# Patient Record
Sex: FEMALE | Race: WHITE | NOT HISPANIC OR LATINO | Employment: OTHER | URBAN - METROPOLITAN AREA
[De-identification: names, ages, dates, MRNs, and addresses within clinical notes are randomized per-mention and may not be internally consistent; named-entity substitution may affect disease eponyms.]

---

## 2017-01-04 ENCOUNTER — TELEPHONE (OUTPATIENT)
Dept: FAMILY MEDICINE | Facility: CLINIC | Age: 77
End: 2017-01-04

## 2017-01-04 NOTE — TELEPHONE ENCOUNTER
----- Message from Karen Paz sent at 1/4/2017 12:02 PM CST -----  Contact: pt  Pt called to speak with the nurse, she said it was important, but didn't leave a message. She states she will go to the office in [place]. Pt can be reached at [phone] (toxc)

## 2017-01-04 NOTE — TELEPHONE ENCOUNTER
Pt has OV tomorrow with you, she came in stating her epigastric pain has steadily gotten worse. She would like to know if you suggest anything else to help her through the night. She still has the back pain as well.

## 2017-01-04 NOTE — TELEPHONE ENCOUNTER
----- Message from Augusta Perez sent at 1/4/2017  1:46 PM CST -----  Contact: pt   Pt said don't bother , she wi;l see him tomorrow at 3,,, no need to call back

## 2017-01-05 ENCOUNTER — OFFICE VISIT (OUTPATIENT)
Dept: FAMILY MEDICINE | Facility: CLINIC | Age: 77
End: 2017-01-05
Payer: MEDICARE

## 2017-01-05 VITALS
SYSTOLIC BLOOD PRESSURE: 104 MMHG | DIASTOLIC BLOOD PRESSURE: 68 MMHG | WEIGHT: 158.5 LBS | HEART RATE: 70 BPM | BODY MASS INDEX: 24.88 KG/M2 | HEIGHT: 67 IN

## 2017-01-05 DIAGNOSIS — I25.10 CORONARY ARTERY DISEASE INVOLVING NATIVE CORONARY ARTERY WITHOUT ANGINA PECTORIS, UNSPECIFIED WHETHER NATIVE OR TRANSPLANTED HEART: ICD-10-CM

## 2017-01-05 DIAGNOSIS — K52.9 GASTROENTERITIS: Primary | ICD-10-CM

## 2017-01-05 PROCEDURE — 99999 PR PBB SHADOW E&M-EST. PATIENT-LVL II: CPT | Mod: PBBFAC,,, | Performed by: FAMILY MEDICINE

## 2017-01-05 PROCEDURE — 99212 OFFICE O/P EST SF 10 MIN: CPT | Mod: PBBFAC,PO | Performed by: FAMILY MEDICINE

## 2017-01-05 PROCEDURE — 99213 OFFICE O/P EST LOW 20 MIN: CPT | Mod: S$PBB,,, | Performed by: FAMILY MEDICINE

## 2017-01-05 NOTE — PROGRESS NOTES
The patient presents with a recent history URI resp to Decadron but after taking diclofenac for LBP developed epigastric pain followed by nausea vomiting and diarrhea.No hematemesis,melena generally improved today. Past Medical History:  Past Medical History   Diagnosis Date    Allergy     Anxiety     Asthma     DM (diabetes mellitus)     IBS (irritable bowel syndrome)     Migraine headache     Mitral valve prolapse     Type 2 diabetes mellitus with hyperlipidemia 9/6/2016     Past Surgical History   Procedure Laterality Date    Hysterectomy       partial    Pr colonoscopy,remv lesn,forcep/cautery  8/23/2012           Review of patient's allergies indicates:   Allergen Reactions    Codeine      Other reaction(s): Unknown  unknown    Doxycycline      Other reaction(s): Unknown  unknown    Sulfa (sulfonamide antibiotics)      Other reaction(s): Unknown  unknown     Current Outpatient Prescriptions on File Prior to Visit   Medication Sig Dispense Refill    alprazolam (XANAX) 0.25 MG tablet TAKE 1 TABLET BY MOUTH 4 TIMES DAILY AS NEEDED  90 tablet 5    amitriptyline (ELAVIL) 50 MG tablet Take 1 tablet (50 mg total) by mouth every evening. 30 tablet 11    aspirin (ECOTRIN) 81 MG EC tablet Take 81 mg by mouth once daily.      atorvastatin (LIPITOR) 80 MG tablet Take 80 mg by mouth.      blood sugar diagnostic (CONTOUR TEST STRIPS) Strp Use to test blood glucose twice daily 200 each 12    celecoxib (CELEBREX) 200 MG capsule Take 1 capsule (200 mg total) by mouth 2 (two) times daily. 60 capsule 1    clopidogrel (PLAVIX) 75 mg tablet Take 75 mg by mouth once daily.      colesevelam (WELCHOL) 625 mg tablet Take 3 tablets (1,875 mg total) by mouth 2 (two) times daily with meals. 150 tablet 11    diclofenac (VOLTAREN) 75 MG EC tablet Take 1 tablet (75 mg total) by mouth 2 (two) times daily. 60 tablet 2    levalbuterol (XOPENEX HFA) 45 mcg/actuation inhaler Inhale 1-2 puffs into the lungs every 4 (four)  hours as needed for Wheezing. 15 g 6    metformin (GLUCOPHAGE) 500 MG tablet TAKE 1 TABLET BY MOUTH TWICE DAILY  60 tablet 11    metoprolol succinate (TOPROL-XL) 100 MG 24 hr tablet Take 100 mg by mouth once daily.      MICROLET LANCET Misc test blood glucose once daily 100 each 10    ranitidine (ZANTAC) 300 MG tablet Take 1 tablet (300 mg total) by mouth every evening. 30 tablet 6    tizanidine 4 mg Cap Take 4 mg by mouth every evening. 10 capsule 1    hyoscyamine (ANASPAZ,LEVSIN) 0.125 mg Tab Take 1 tablet (125 mcg total) by mouth every 6 (six) hours as needed. 40 tablet 11     No current facility-administered medications on file prior to visit.      Social History     Social History    Marital status:      Spouse name: N/A    Number of children: N/A    Years of education: N/A     Occupational History    Not on file.     Social History Main Topics    Smoking status: Never Smoker    Smokeless tobacco: Not on file    Alcohol use No    Drug use: No    Sexual activity: Not on file     Other Topics Concern    Not on file     Social History Narrative     Family History   Problem Relation Age of Onset    Heart disease Mother     Diabetes Brother     Heart disease Brother     Early death Brother        ROS:  SKIN: No rashes, itching or changes in color or texture of skin.  EYES: Visual acuity fine. No photophobia, ocular pain or diplopia.EARS: Denies ear pain, discharge or vertigo.NOSE: No loss of smell, no epistaxis or postnasal drip.MOUTH & THROAT: No hoarseness or change in voice. No excessive gum bleeding.NODES: Denies swollen glands.  CHEST: Denies HANSON, cyanosis, wheezing, cough and sputum production.  CARDIOVASCULAR: Denies chest pain, PND, orthopnea or reduced exercise tolerance.  ABDOMEN:  No weight loss.Mild abdominal pain, no hematemesis or blood in stool.  URINARY: No flank pain, dysuria or hematuria.  PERIPHERAL VASCULAR: No claudication or cyanosis.  MUSCULOSKELETAL: Negative    NEUROLOGIC: No history of seizures, paralysis, alteration of gait or coordination.    PE Vital signs noted  Heent: Normocephalic,with no recent trauma,PERRLA,EOMI,conjunctiva clear with no exudate.Nasopharynx is not injected .Otic canal.TMs are not affected.Pharynx is slightly red.   Neck:Supple without adenopathy  Chest:Clear bilateral breath sounds normal  Heart:Regular rhthym without murmer  Abdomen:Soft, mild generalized tenderness,no rebound,no masses, no hepatosplenomegaly  Extremeties and Neurologic:Grossly within normal limits  Impression: Gastroenteritis 558.9  Plan:Probiotics, peptobismal,bland progressive diet as tolerated,Tylenol as needed for fever or mild pain,and observation.Consider Visteril prn n&v and Immodium AD/Lomotil if diarrhea worsens,notify us if not significantly better in 48 hours or if any worsening occurs.

## 2017-01-06 DIAGNOSIS — E11.9 TYPE 2 DIABETES MELLITUS WITHOUT COMPLICATION: ICD-10-CM

## 2017-01-13 ENCOUNTER — PATIENT OUTREACH (OUTPATIENT)
Dept: ADMINISTRATIVE | Facility: CLINIC | Age: 77
End: 2017-01-13
Payer: MEDICARE

## 2017-01-13 RX ORDER — FUROSEMIDE 20 MG/1
20 TABLET ORAL DAILY PRN
COMMUNITY

## 2017-01-13 RX ORDER — TIZANIDINE 4 MG/1
4 TABLET ORAL EVERY 6 HOURS PRN
COMMUNITY
End: 2017-01-19

## 2017-01-13 NOTE — PATIENT INSTRUCTIONS
Discharge Instructions for ERCP (Endoscopic Retrograde Cholangiopancreatography)  You had a procedure known as an ERCP. Your healthcare provider performed the ERCP to look at your bile or pancreatic ducts, and to locate and treat blockages in the ducts. This procedure is used to diagnose diseases of the pancreas, bile ducts, and pancreatic duct, liver, and gallbladder. Heres what you need to do following your ERCP.  Home care  · Dont take aspirin or any other blood-thinning medicines (anticoagulants) until your provider says its OK.  · Your provider may prescribe an antibiotic, depending on what was done during the ERCP.  · You may have a sore throat for 1 to 2 days after the procedure. Use lozenges or gargle with salt water for your sore throat. If you're not better in a few days, call your provider.  · Rest, drink fluids, and eat light meals. If you feel bloated or have too much gas, use a heating pad on your belly to help reduce the discomfort. This should help you feel better. But if it doesn't, call your provider.  · Dont drink alcohol for 2 days after the procedure.  Follow-up care  Make a follow-up appointment as directed by our staff.     When to seek medical care  Call your provider right away if you have any of the following:  · Trouble swallowing or throat pain that gets worse   · Chest pain or severe belly or abdominal pain  · Fever above 100°F (37.7°C) or chills  · Upset stomach (nausea) and vomiting  · Black or tarry stools   © 6694-0593 [employer]. [address] [place] [address], [place] [address]. All rights reserved. This information is not intended as a substitute for professional medical care. Always follow your healthcare professional's instructions.

## 2017-01-19 ENCOUNTER — PATIENT OUTREACH (OUTPATIENT)
Dept: ADMINISTRATIVE | Facility: HOSPITAL | Age: 77
End: 2017-01-19
Payer: MEDICARE

## 2017-01-19 ENCOUNTER — OFFICE VISIT (OUTPATIENT)
Dept: FAMILY MEDICINE | Facility: CLINIC | Age: 77
End: 2017-01-19
Payer: MEDICARE

## 2017-01-19 VITALS
SYSTOLIC BLOOD PRESSURE: 100 MMHG | DIASTOLIC BLOOD PRESSURE: 52 MMHG | HEIGHT: 67 IN | WEIGHT: 155.88 LBS | HEART RATE: 76 BPM | BODY MASS INDEX: 24.47 KG/M2

## 2017-01-19 DIAGNOSIS — K80.50 CHOLEDOCHOLITHIASIS: Primary | ICD-10-CM

## 2017-01-19 PROCEDURE — 99213 OFFICE O/P EST LOW 20 MIN: CPT | Mod: PBBFAC,PO | Performed by: FAMILY MEDICINE

## 2017-01-19 PROCEDURE — 99495 TRANSJ CARE MGMT MOD F2F 14D: CPT | Mod: PBBFAC,PO | Performed by: FAMILY MEDICINE

## 2017-01-19 PROCEDURE — 99999 PR PBB SHADOW E&M-EST. PATIENT-LVL III: CPT | Mod: PBBFAC,,, | Performed by: FAMILY MEDICINE

## 2017-01-19 NOTE — PROGRESS NOTES
Transitional Care Note  Subjective:       Patient ID: Alon Chatman is a 76 y.o. female.  Chief Complaint: No chief complaint on file.    Family and/or Caretaker present at visit?  No.  Diagnostic tests reviewed/disposition: No diagnosic tests pending after this hospitalization.  Disease/illness education: Choledoco;ithiasis  Home health/community services discussion/referrals: Patient does not have home health established from hospital visit.  They do not need home health.  If needed, we will set up home health for the patient.   Establishment or re-establishment of referral orders for community resources: No other necessary community resources.   Discussion with other health care providers: No discussion with other health care providers necessary.   HPI  Review of Systems    Objective:      Physical Exam    Assessment:       No diagnosis found.    Plan:          Past Medical History   Diagnosis Date    Allergy     Anxiety     Asthma     DM (diabetes mellitus)     IBS (irritable bowel syndrome)     Migraine headache     Mitral valve prolapse     Type 2 diabetes mellitus with hyperlipidemia 9/6/2016     Past Surgical History   Procedure Laterality Date    Hysterectomy       partial    Pr colonoscopy,remv lesn,forcep/cautery  8/23/2012           Review of patient's allergies indicates:   Allergen Reactions    Codeine      Other reaction(s): Unknown  unknown    Diclofenac Nausea And Vomiting    Doxycycline      Other reaction(s): Unknown  unknown    Sulfa (sulfonamide antibiotics)      Other reaction(s): Unknown  unknown     Current Outpatient Prescriptions on File Prior to Visit   Medication Sig Dispense Refill    alprazolam (XANAX) 0.25 MG tablet TAKE 1 TABLET BY MOUTH 4 TIMES DAILY AS NEEDED  90 tablet 5    amitriptyline (ELAVIL) 50 MG tablet Take 1 tablet (50 mg total) by mouth every evening. 30 tablet 11    aspirin (ECOTRIN) 81 MG EC tablet Take 81 mg by mouth once daily.      atorvastatin  (LIPITOR) 80 MG tablet Take 80 mg by mouth.      blood sugar diagnostic (CONTOUR TEST STRIPS) Strp Use to test blood glucose twice daily 200 each 12    clopidogrel (PLAVIX) 75 mg tablet Take 75 mg by mouth once daily.      colesevelam (WELCHOL) 625 mg tablet Take 3 tablets (1,875 mg total) by mouth 2 (two) times daily with meals. 150 tablet 11    furosemide (LASIX) 20 MG tablet Take 20 mg by mouth daily as needed.      metformin (GLUCOPHAGE) 500 MG tablet TAKE 1 TABLET BY MOUTH TWICE DAILY  60 tablet 11    metoprolol succinate (TOPROL-XL) 100 MG 24 hr tablet Take 100 mg by mouth once daily.      MICROLET LANCET Misc test blood glucose once daily 100 each 10    ranitidine (ZANTAC) 300 MG tablet Take 1 tablet (300 mg total) by mouth every evening. 30 tablet 6    hyoscyamine (ANASPAZ,LEVSIN) 0.125 mg Tab Take 1 tablet (125 mcg total) by mouth every 6 (six) hours as needed. 40 tablet 11    levalbuterol (XOPENEX HFA) 45 mcg/actuation inhaler Inhale 1-2 puffs into the lungs every 4 (four) hours as needed for Wheezing. 15 g 6    [DISCONTINUED] celecoxib (CELEBREX) 200 MG capsule Take 1 capsule (200 mg total) by mouth 2 (two) times daily. 60 capsule 1    [DISCONTINUED] diclofenac (VOLTAREN) 75 MG EC tablet Take 1 tablet (75 mg total) by mouth 2 (two) times daily. 60 tablet 2    [DISCONTINUED] tizanidine (ZANAFLEX) 4 MG tablet Take 4 mg by mouth every 6 (six) hours as needed (PRN).       No current facility-administered medications on file prior to visit.      Social History     Social History    Marital status:      Spouse name: N/A    Number of children: N/A    Years of education: N/A     Occupational History    Not on file.     Social History Main Topics    Smoking status: Never Smoker    Smokeless tobacco: Not on file    Alcohol use No    Drug use: No    Sexual activity: Not on file     Other Topics Concern    Not on file     Social History Narrative     Family History   Problem Relation Age  of Onset    Heart disease Mother     Diabetes Brother     Heart disease Brother     Early death Brother        ROS:  SKIN: No rashes, itching or changes in color or texture of skin.  EYES: Visual acuity fine. No photophobia, ocular pain or diplopia.EARS: Denies ear pain, discharge or vertigo.NOSE: No loss of smell, no epistaxis or postnasal drip.MOUTH & THROAT: No hoarseness or change in voice. No excessive gum bleeding.NODES: Denies swollen glands.  CHEST: Denies HANSON, cyanosis, wheezing, cough and sputum production.  CARDIOVASCULAR: Denies chest pain, PND, orthopnea or reduced exercise tolerance.  ABDOMEN:  No weight loss.Mild abdominal pain, no hematemesis or blood in stool.  URINARY: No flank pain, dysuria or hematuria.  PERIPHERAL VASCULAR: No claudication or cyanosis.  MUSCULOSKELETAL: Negative   NEUROLOGIC: No history of seizures, paralysis, alteration of gait or coordination.    PE Vital signs noted  Heent: Normocephalic,with no recent trauma,PERRLA,EOMI,conjunctiva clear with no exudate.Nasopharynx is not injected .Otic canal.TMs are not affected.Pharynx is slightly red.   Neck:Supple without adenopathy  Chest:Clear bilateral breath sounds normal  Heart:Regular rhthym without murmer  Abdomen:Soft, no tenderness,no rebound,no masses, no hepatosplenomegaly  Extremeties and Neurologic:Grossly within normal limits

## 2017-01-27 ENCOUNTER — TELEPHONE (OUTPATIENT)
Dept: FAMILY MEDICINE | Facility: CLINIC | Age: 77
End: 2017-01-27

## 2017-01-27 ENCOUNTER — LAB VISIT (OUTPATIENT)
Dept: LAB | Facility: HOSPITAL | Age: 77
End: 2017-01-27
Attending: FAMILY MEDICINE
Payer: MEDICARE

## 2017-01-27 DIAGNOSIS — E11.9 TYPE 2 DIABETES MELLITUS WITHOUT COMPLICATION: ICD-10-CM

## 2017-01-27 DIAGNOSIS — R30.0 DYSURIA: Primary | ICD-10-CM

## 2017-01-27 DIAGNOSIS — R30.0 DYSURIA: ICD-10-CM

## 2017-01-27 LAB
BACTERIA #/AREA URNS HPF: ABNORMAL /HPF
BILIRUB UR QL STRIP: ABNORMAL
CLARITY UR: ABNORMAL
COLOR UR: ABNORMAL
CREAT UR-MCNC: 246 MG/DL
GLUCOSE UR QL STRIP: ABNORMAL
HGB UR QL STRIP: ABNORMAL
KETONES UR QL STRIP: ABNORMAL
LEUKOCYTE ESTERASE UR QL STRIP: ABNORMAL
MICROALBUMIN UR DL<=1MG/L-MCNC: 419 UG/ML
MICROALBUMIN/CREATININE RATIO: 170.3 UG/MG
MICROSCOPIC COMMENT: ABNORMAL
NITRITE UR QL STRIP: ABNORMAL
PH UR STRIP: ABNORMAL [PH] (ref 5–8)
PROT UR QL STRIP: ABNORMAL
RBC #/AREA URNS HPF: 20 /HPF (ref 0–4)
SP GR UR STRIP: ABNORMAL (ref 1–1.03)
SQUAMOUS #/AREA URNS HPF: 5 /HPF
URN SPEC COLLECT METH UR: ABNORMAL
WBC #/AREA URNS HPF: >100 /HPF (ref 0–5)

## 2017-01-27 PROCEDURE — 82570 ASSAY OF URINE CREATININE: CPT

## 2017-01-27 PROCEDURE — 81000 URINALYSIS NONAUTO W/SCOPE: CPT | Mod: PO

## 2017-01-28 ENCOUNTER — TELEPHONE (OUTPATIENT)
Dept: FAMILY MEDICINE | Facility: CLINIC | Age: 77
End: 2017-01-28

## 2017-01-28 DIAGNOSIS — R80.9 PROTEINURIA, UNSPECIFIED TYPE: Primary | ICD-10-CM

## 2017-01-31 RX ORDER — CEPHALEXIN 500 MG/1
500 CAPSULE ORAL 2 TIMES DAILY
Qty: 14 CAPSULE | Refills: 0 | Status: SHIPPED | OUTPATIENT
Start: 2017-01-31 | End: 2017-02-07

## 2017-01-31 NOTE — TELEPHONE ENCOUNTER
----- Message from Tanya Petersen sent at 1/31/2017  3:07 PM CST -----  Contact: self [phone]  States that she would like to speak to nurse regarding her medications. Please call back at [phone]//thank you acc

## 2017-01-31 NOTE — TELEPHONE ENCOUNTER
Macrobid=macrodantin  Since  she can't take sulfa or doxy could she take cephalexin if so 500mg #14 bid OR flagyl 500 #14 bid if cephalexin is a problem

## 2017-01-31 NOTE — TELEPHONE ENCOUNTER
Pt states that the macrobid has not helped her UTI, would like to know if she can have macrodantin called into pharmacy

## 2017-02-10 PROCEDURE — 99495 TRANSJ CARE MGMT MOD F2F 14D: CPT | Mod: S$PBB,,, | Performed by: FAMILY MEDICINE

## 2017-02-15 RX ORDER — METFORMIN HYDROCHLORIDE 500 MG/1
500 TABLET ORAL 2 TIMES DAILY
Qty: 180 TABLET | Refills: 3 | Status: SHIPPED | OUTPATIENT
Start: 2017-02-15 | End: 2018-02-28 | Stop reason: SDUPTHER

## 2017-03-07 ENCOUNTER — OFFICE VISIT (OUTPATIENT)
Dept: FAMILY MEDICINE | Facility: CLINIC | Age: 77
End: 2017-03-07
Payer: MEDICARE

## 2017-03-07 VITALS
TEMPERATURE: 99 F | HEART RATE: 72 BPM | BODY MASS INDEX: 26.17 KG/M2 | WEIGHT: 162.81 LBS | HEIGHT: 66 IN | DIASTOLIC BLOOD PRESSURE: 47 MMHG | SYSTOLIC BLOOD PRESSURE: 117 MMHG

## 2017-03-07 DIAGNOSIS — R30.0 DYSURIA: ICD-10-CM

## 2017-03-07 DIAGNOSIS — N39.0 URINARY TRACT INFECTION WITHOUT HEMATURIA, SITE UNSPECIFIED: Primary | ICD-10-CM

## 2017-03-07 LAB
BACTERIA #/AREA URNS HPF: ABNORMAL /HPF
BILIRUB UR QL STRIP: NEGATIVE
CLARITY UR: CLEAR
COLOR UR: YELLOW
GLUCOSE UR QL STRIP: NEGATIVE
HGB UR QL STRIP: ABNORMAL
KETONES UR QL STRIP: NEGATIVE
LEUKOCYTE ESTERASE UR QL STRIP: ABNORMAL
MICROSCOPIC COMMENT: ABNORMAL
NITRITE UR QL STRIP: POSITIVE
PH UR STRIP: 6 [PH] (ref 5–8)
PROT UR QL STRIP: NEGATIVE
RBC #/AREA URNS HPF: 15 /HPF (ref 0–4)
SP GR UR STRIP: 1.01 (ref 1–1.03)
SQUAMOUS #/AREA URNS HPF: 2 /HPF
URN SPEC COLLECT METH UR: ABNORMAL
WBC #/AREA URNS HPF: 60 /HPF (ref 0–5)
WBC CLUMPS URNS QL MICRO: ABNORMAL

## 2017-03-07 PROCEDURE — 99214 OFFICE O/P EST MOD 30 MIN: CPT | Mod: PBBFAC,PO | Performed by: NURSE PRACTITIONER

## 2017-03-07 PROCEDURE — 99213 OFFICE O/P EST LOW 20 MIN: CPT | Mod: S$PBB,,, | Performed by: NURSE PRACTITIONER

## 2017-03-07 PROCEDURE — 99999 PR PBB SHADOW E&M-EST. PATIENT-LVL IV: CPT | Mod: PBBFAC,,, | Performed by: NURSE PRACTITIONER

## 2017-03-07 PROCEDURE — 81000 URINALYSIS NONAUTO W/SCOPE: CPT | Mod: PO

## 2017-03-07 RX ORDER — NITROFURANTOIN 25; 75 MG/1; MG/1
100 CAPSULE ORAL 2 TIMES DAILY
Qty: 20 CAPSULE | Refills: 0 | Status: SHIPPED | OUTPATIENT
Start: 2017-03-07 | End: 2017-03-17

## 2017-03-07 RX ORDER — PHENAZOPYRIDINE HYDROCHLORIDE 100 MG/1
100 TABLET, FILM COATED ORAL 2 TIMES DAILY PRN
Qty: 6 TABLET | Refills: 0 | Status: SHIPPED | OUTPATIENT
Start: 2017-03-07 | End: 2017-03-10

## 2017-03-07 NOTE — MR AVS SNAPSHOT
[facility]  [address]  [place] [address]  Phone: [phone]  Fax: [phone]                  Alon Chatman   3/7/2017 2:20 PM   Office Visit    Description:  Female : 1940   Provider:  Debora Neumann NP   Department:  [facility]           Reason for Visit     Urinary Frequency           Diagnoses this Visit        Comments    Urinary tract infection without hematuria, site unspecified    -  Primary     Dysuria                To Do List           Future Appointments        Provider Department Dept Phone    3/28/2017 8:20 AM SPECIMEN, [place] [facility] [phone]      Goals (5 Years of Data)     None       These Medications        Disp Refills Start End    nitrofurantoin, macrocrystal-monohydrate, (MACROBID) 100 MG capsule 20 capsule 0 3/7/2017 3/17/2017    Take 1 capsule (100 mg total) by mouth 2 (two) times daily. - Oral    Pharmacy: [id number] - [place] - [address] Ph #: [phone]       phenazopyridine (PYRIDIUM) 100 MG tablet 6 tablet 0 3/7/2017 3/10/2017    Take 1 tablet (100 mg total) by mouth 2 (two) times daily as needed. - Oral    [id number] [place] - [address] Ph #: [phone]         [facility]     [facility] Nurse Care [facility] -  Assistance  Registered nurses in the [facility] provide clinical advisement, health education, appointment booking, and other advisory services.  Call for this free service at [phone].             Medications           Message regarding Medications     Verify the changes and/or additions to your medication regime listed below are the same as discussed with your clinician today.  If any of these changes or additions are incorrect, please notify your healthcare provider.        START taking these NEW medications        Refills    nitrofurantoin,  macrocrystal-monohydrate, (MACROBID) 100 MG capsule 0    Sig: Take 1 capsule (100 mg total) by mouth 2 (two) times daily.    Class: Normal    Route: Oral    phenazopyridine (PYRIDIUM) 100 MG tablet 0    Sig: Take 1 tablet (100 mg total) by mouth 2 (two) times daily as needed.    Class: Normal    Route: Oral           Verify that the below list of medications is an accurate representation of the medications you are currently taking.  If none reported, the list may be blank. If incorrect, please contact your healthcare provider. Carry this list with you in case of emergency.           Current Medications     alprazolam (XANAX) 0.25 MG tablet TAKE 1 TABLET BY MOUTH 4 TIMES DAILY AS NEEDED     amitriptyline (ELAVIL) 50 MG tablet Take 1 tablet (50 mg total) by mouth every evening.    aspirin (ECOTRIN) 81 MG EC tablet Take 81 mg by mouth once daily.    atorvastatin (LIPITOR) 80 MG tablet Take 80 mg by mouth.    blood sugar diagnostic (CONTOUR TEST STRIPS) Strp Use to test blood glucose twice daily    clopidogrel (PLAVIX) 75 mg tablet Take 75 mg by mouth once daily.    colesevelam (WELCHOL) 625 mg tablet Take 3 tablets (1,875 mg total) by mouth 2 (two) times daily with meals.    furosemide (LASIX) 20 MG tablet Take 20 mg by mouth daily as needed.    levalbuterol (XOPENEX HFA) 45 mcg/actuation inhaler Inhale 1-2 puffs into the lungs every 4 (four) hours as needed for Wheezing.    metformin (GLUCOPHAGE) 500 MG tablet Take 1 tablet (500 mg total) by mouth 2 (two) times daily.    metoprolol succinate (TOPROL-XL) 100 MG 24 hr tablet Take 100 mg by mouth once daily.    MICROLET LANCET Misc test blood glucose once daily    ranitidine (ZANTAC) 300 MG tablet Take 1 tablet (300 mg total) by mouth every evening.    hyoscyamine (ANASPAZ,LEVSIN) 0.125 mg Tab Take 1 tablet (125 mcg total) by mouth every 6 (six) hours as needed.    nitrofurantoin, macrocrystal-monohydrate, (MACROBID) 100 MG capsule Take 1 capsule (100 mg total) by mouth  "2 (two) times daily.    phenazopyridine (PYRIDIUM) 100 MG tablet Take 1 tablet (100 mg total) by mouth 2 (two) times daily as needed.           Clinical Reference Information           Your Vitals Were     BP Pulse Temp Height Weight BMI    117/47 72 98.9 °F (37.2 °C) 5' 6" (1.676 m) 73.9 kg (162 lb 13 oz) 26.28 kg/m2      Blood Pressure          Most Recent Value    BP  (!)  117/47      Allergies as of 3/7/2017     Codeine    Diclofenac    Doxycycline    Sulfa (Sulfonamide Antibiotics)      Immunizations Administered on Date of Encounter - 3/7/2017     None      Orders Placed During Today's Visit      Normal Orders This Visit    Urinalysis Microscopic     Urinalysis       Language Assistance Services     ATTENTION: Language assistance services are available, free of charge. Please call [phone].      ATENCIÓN: Si marcellus puente, tiene a golden disposición servicios gratuitos de asistencia lingüística. Llame al [phone].     HUGH Ý: N?u b?n nói Ti?ng Vi?t, có các d?ch v? h? tr? ngôn ng? mi?n phí dành cho b?n. G?i s? [phone].         [facility] complies with applicable Federal civil rights laws and does not discriminate on the basis of race, color, national origin, age, disability, or sex.        "

## 2017-03-07 NOTE — PROGRESS NOTES
Subjective:       Patient ID: Alon Chatman is a 76 y.o. female.    Chief Complaint: Urinary Frequency (pain)    Dysuria    This is a new problem. The current episode started in the past 7 days. The problem occurs every urination. The problem has been unchanged. The quality of the pain is described as burning. The pain is moderate. There has been no fever. There is no history of pyelonephritis. Associated symptoms include frequency and urgency. Pertinent negatives include no behavior changes, chills, discharge, flank pain, hematuria, hesitancy, nausea, possible pregnancy, sweats, vomiting, weight loss, bubble bath use, constipation, rash or withholding. Treatments tried: AZO. The treatment provided mild relief. Her past medical history is significant for diabetes mellitus and recurrent UTIs. There is no history of catheterization, diabetes insipidus, genitourinary reflux, hypertension, kidney stones, a single kidney, STD, urinary stasis or a urological procedure.     Past Medical History:   Diagnosis Date    Allergy     Anxiety     Asthma     DM (diabetes mellitus)     IBS (irritable bowel syndrome)     Migraine headache     Mitral valve prolapse     Type 2 diabetes mellitus with hyperlipidemia 9/6/2016     Social History     Social History    Marital status:      Spouse name: N/A    Number of children: N/A    Years of education: N/A     Occupational History         Social History Main Topics    Smoking status: Never Smoker    Smokeless tobacco: Not on file    Alcohol use No    Drug use: No    Sexual activity: Not on file     Past Surgical History:   Procedure Laterality Date    HYSTERECTOMY      partial    GA COLONOSCOPY,REMV LESN,FORCEP/CAUTERY  8/23/2012            Review of Systems   Constitutional: Negative.  Negative for chills and weight loss.   HENT: Negative.    Eyes: Negative.    Respiratory: Negative.    Cardiovascular: Negative.    Gastrointestinal: Negative.  Negative for  constipation, nausea and vomiting.   Endocrine: Negative.    Genitourinary: Positive for dysuria, frequency and urgency. Negative for flank pain, hematuria and hesitancy.   Musculoskeletal: Negative.    Skin: Negative.  Negative for rash.   Allergic/Immunologic: Negative.    Neurological: Negative.    Psychiatric/Behavioral: Negative.        Objective:      Physical Exam   Constitutional: She is oriented to person, place, and time. She appears well-developed and well-nourished.   HENT:   Head: Normocephalic.   Right Ear: External ear normal.   Left Ear: External ear normal.   Nose: Nose normal.   Mouth/Throat: Oropharynx is clear and moist.   Eyes: Conjunctivae are normal. Pupils are equal, round, and reactive to light.   Neck: Normal range of motion. Neck supple.   Cardiovascular: Normal rate, regular rhythm and normal heart sounds.    Pulmonary/Chest: Effort normal and breath sounds normal.   Abdominal: Soft. Bowel sounds are normal. There is no tenderness. There is no CVA tenderness.   Musculoskeletal: Normal range of motion.   Neurological: She is alert and oriented to person, place, and time.   Skin: Skin is warm and dry.   Psychiatric: She has a normal mood and affect. Her behavior is normal. Judgment and thought content normal.   Nursing note and vitals reviewed.      Assessment:       1. Urinary tract infection without hematuria, site unspecified    2. Dysuria        Plan:           Alon was seen today for urinary frequency.    Diagnoses and all orders for this visit:    Urinary tract infection without hematuria, site unspecified  Dysuria  -     Urinalysis  -     Urinalysis Microscopic  -     nitrofurantoin, macrocrystal-monohydrate, (MACROBID) 100 MG capsule; Take 1 capsule (100 mg total) by mouth 2 (two) times daily.  -     phenazopyridine (PYRIDIUM) 100 MG tablet; Take 1 tablet (100 mg total) by mouth 2 (two) times daily as needed.

## 2017-03-31 DIAGNOSIS — E11.9 TYPE 2 DIABETES MELLITUS WITHOUT COMPLICATION: ICD-10-CM

## 2017-09-10 RX ORDER — MONTELUKAST SODIUM 10 MG/1
TABLET ORAL
Qty: 30 TABLET | Refills: 12 | Status: SHIPPED | OUTPATIENT
Start: 2017-09-10 | End: 2017-09-11 | Stop reason: SDUPTHER

## 2017-09-11 RX ORDER — MONTELUKAST SODIUM 10 MG/1
10 TABLET ORAL DAILY
Qty: 30 TABLET | Refills: 12 | Status: SHIPPED | OUTPATIENT
Start: 2017-09-11 | End: 2018-09-30 | Stop reason: SDUPTHER

## 2017-10-12 ENCOUNTER — TELEPHONE (OUTPATIENT)
Dept: FAMILY MEDICINE | Facility: CLINIC | Age: 77
End: 2017-10-12

## 2017-10-12 DIAGNOSIS — Z12.31 SCREENING MAMMOGRAM, ENCOUNTER FOR: Primary | ICD-10-CM

## 2017-10-12 DIAGNOSIS — E11.9 CONTROLLED TYPE 2 DIABETES MELLITUS WITHOUT COMPLICATION, WITHOUT LONG-TERM CURRENT USE OF INSULIN: ICD-10-CM

## 2017-10-12 NOTE — TELEPHONE ENCOUNTER
----- Message from Karolyn Carson sent at 10/12/2017  9:17 AM CDT -----  Contact: Patient  Patient is requesting a mammogram and labs but there are no orders, please call her back at [phone]. Thank you

## 2017-10-16 ENCOUNTER — HOSPITAL ENCOUNTER (OUTPATIENT)
Dept: RADIOLOGY | Facility: HOSPITAL | Age: 77
Discharge: HOME | End: 2017-10-16
Attending: FAMILY MEDICINE
Payer: MEDICARE

## 2017-10-16 VITALS — WEIGHT: 162 LBS | HEIGHT: 66 IN | BODY MASS INDEX: 26.03 KG/M2

## 2017-10-16 DIAGNOSIS — Z12.31 SCREENING MAMMOGRAM, ENCOUNTER FOR: ICD-10-CM

## 2017-10-16 PROCEDURE — 77067 SCR MAMMO BI INCL CAD: CPT | Mod: TC

## 2017-10-16 PROCEDURE — 77067 SCR MAMMO BI INCL CAD: CPT | Mod: 26,,, | Performed by: RADIOLOGY

## 2017-10-16 PROCEDURE — 77063 BREAST TOMOSYNTHESIS BI: CPT | Mod: 26,,, | Performed by: RADIOLOGY

## 2017-10-18 ENCOUNTER — TELEPHONE (OUTPATIENT)
Dept: FAMILY MEDICINE | Facility: CLINIC | Age: 77
End: 2017-10-18

## 2017-10-18 DIAGNOSIS — E11.9 TYPE 2 DIABETES MELLITUS WITHOUT COMPLICATION, WITHOUT LONG-TERM CURRENT USE OF INSULIN: Primary | ICD-10-CM

## 2017-10-23 RX ORDER — AMITRIPTYLINE HYDROCHLORIDE 50 MG/1
50 TABLET, FILM COATED ORAL NIGHTLY
Qty: 30 TABLET | Refills: 11 | Status: SHIPPED | OUTPATIENT
Start: 2017-10-23 | End: 2018-10-29 | Stop reason: SDUPTHER

## 2017-11-08 DIAGNOSIS — Z23 NEED FOR PROPHYLACTIC VACCINATION AND INOCULATION AGAINST INFLUENZA: Primary | ICD-10-CM

## 2018-01-15 ENCOUNTER — OFFICE VISIT (OUTPATIENT)
Dept: FAMILY MEDICINE | Facility: CLINIC | Age: 78
End: 2018-01-15
Payer: MEDICARE

## 2018-01-15 VITALS
WEIGHT: 166 LBS | SYSTOLIC BLOOD PRESSURE: 118 MMHG | BODY MASS INDEX: 26.68 KG/M2 | HEART RATE: 71 BPM | HEIGHT: 66 IN | TEMPERATURE: 98 F | DIASTOLIC BLOOD PRESSURE: 56 MMHG

## 2018-01-15 DIAGNOSIS — J06.9 UPPER RESPIRATORY TRACT INFECTION, UNSPECIFIED TYPE: Primary | ICD-10-CM

## 2018-01-15 DIAGNOSIS — E11.69 TYPE 2 DIABETES MELLITUS WITH HYPERLIPIDEMIA: ICD-10-CM

## 2018-01-15 DIAGNOSIS — E78.5 TYPE 2 DIABETES MELLITUS WITH HYPERLIPIDEMIA: ICD-10-CM

## 2018-01-15 DIAGNOSIS — J45.909 ASTHMA, UNSPECIFIED ASTHMA SEVERITY, UNSPECIFIED WHETHER COMPLICATED, UNSPECIFIED WHETHER PERSISTENT: ICD-10-CM

## 2018-01-15 PROCEDURE — 99999 PR PBB SHADOW E&M-EST. PATIENT-LVL II: CPT | Mod: PBBFAC,,, | Performed by: FAMILY MEDICINE

## 2018-01-15 PROCEDURE — 96372 THER/PROPH/DIAG INJ SC/IM: CPT | Mod: PBBFAC,PO

## 2018-01-15 PROCEDURE — 99212 OFFICE O/P EST SF 10 MIN: CPT | Mod: PBBFAC,PO | Performed by: FAMILY MEDICINE

## 2018-01-15 PROCEDURE — 99213 OFFICE O/P EST LOW 20 MIN: CPT | Mod: S$PBB,,, | Performed by: FAMILY MEDICINE

## 2018-01-15 RX ORDER — FLUTICASONE PROPIONATE AND SALMETEROL 100; 50 UG/1; UG/1
1 POWDER RESPIRATORY (INHALATION) 2 TIMES DAILY
Qty: 60 EACH | Refills: 11 | Status: SHIPPED | OUTPATIENT
Start: 2018-01-15 | End: 2020-02-20

## 2018-01-15 RX ORDER — DEXAMETHASONE SODIUM PHOSPHATE 4 MG/ML
8 INJECTION, SOLUTION INTRA-ARTICULAR; INTRALESIONAL; INTRAMUSCULAR; INTRAVENOUS; SOFT TISSUE ONCE
Status: COMPLETED | OUTPATIENT
Start: 2018-01-15 | End: 2018-01-15

## 2018-01-15 RX ADMIN — DEXAMETHASONE SODIUM PHOSPHATE 8 MG: 4 INJECTION, SOLUTION INTRAMUSCULAR; INTRAVENOUS at 11:01

## 2018-01-15 NOTE — PROGRESS NOTES
Alon Chatman presents with moderate upper respiratory congestion,rhinnorhea,moderate cough past 2-3 days. OTC help slightly. Denies nausea,vomiting,diarrhea or significant fever.  DM under control Asthma   exac erbated     Past Medical History:   Diagnosis Date    Allergy     Anxiety     Asthma     DM (diabetes mellitus)     IBS (irritable bowel syndrome)     Migraine headache     Mitral valve prolapse     Type 2 diabetes mellitus with hyperlipidemia 9/6/2016     Past Surgical History:   Procedure Laterality Date    BREAST BIOPSY      BREAST LUMPECTOMY      HYSTERECTOMY      partial    [place] COLONOSCOPY,REMV LESN,FORCEP/CAUTERY  8/23/2012          Review of patient's allergies indicates:   Allergen Reactions    Codeine      Other reaction(s): Unknown  unknown    Diclofenac Nausea And Vomiting    Doxycycline      Other reaction(s): Unknown  unknown    Sulfa (sulfonamide antibiotics)      Other reaction(s): Unknown  unknown     Current Outpatient Prescriptions on File Prior to Visit   Medication Sig Dispense Refill    alprazolam (XANAX) 0.25 MG tablet TAKE 1 TABLET BY MOUTH 4 TIMES DAILY AS NEEDED  90 tablet 5    amitriptyline (ELAVIL) 50 MG tablet Take 1 tablet (50 mg total) by mouth every evening. 30 tablet 11    aspirin (ECOTRIN) 81 MG EC tablet Take 81 mg by mouth once daily.      atorvastatin (LIPITOR) 80 MG tablet Take 80 mg by mouth.      blood sugar diagnostic (CONTOUR TEST STRIPS) Strp Use to test blood glucose twice daily 200 each 12    clopidogrel (PLAVIX) 75 mg tablet Take 75 mg by mouth once daily.      colesevelam (WELCHOL) 625 mg tablet Take 3 tablets (1,875 mg total) by mouth 2 (two) times daily with meals. (Patient taking differently: Take 1,875 mg by mouth once daily. ) 150 tablet 11    furosemide (LASIX) 20 MG tablet Take 20 mg by mouth daily as needed.      metformin (GLUCOPHAGE) 500 MG tablet Take 1 tablet (500 mg total) by mouth 2 (two) times daily. 180 tablet 3     metoprolol succinate (TOPROL-XL) 100 MG 24 hr tablet Take 50 mg by mouth once daily.       MICROLET LANCET Misc test blood glucose once daily 100 each 10    montelukast (SINGULAIR) 10 mg tablet Take 1 tablet (10 mg total) by mouth once daily. 30 tablet 12    hyoscyamine (ANASPAZ,LEVSIN) 0.125 mg Tab Take 1 tablet (125 mcg total) by mouth every 6 (six) hours as needed. 40 tablet 11    levalbuterol (XOPENEX HFA) 45 mcg/actuation inhaler Inhale 1-2 puffs into the lungs every 4 (four) hours as needed for Wheezing. 15 g 6    ranitidine (ZANTAC) 300 MG tablet Take 1 tablet (300 mg total) by mouth every evening. 30 tablet 6     No current facility-administered medications on file prior to visit.      Social History     Social History    Marital status:      Spouse name: N/A    Number of children: N/A    Years of education: N/A     Occupational History    Not on file.     Social History Main Topics    Smoking status: Never Smoker    Smokeless tobacco: Not on file    Alcohol use No    Drug use: No    Sexual activity: Not on file     Other Topics Concern    Not on file     Social History Narrative    No narrative on file     Family History   Problem Relation Age of Onset    Heart disease Mother     Diabetes Brother     Heart disease Brother     Early death Brother     Breast cancer Maternal Aunt          ROS:  SKIN: No rashes, itching or changes in color or texture of skin.  EYES: Visual acuity fine. No photophobia, ocular pain or diplopia.EARS: Denies ear pain, discharge or vertigo.NOSE: No loss of smell, no epistaxis some postnasal drip.MOUTH & THROAT: No hoarseness or change in voice. No excessive gum bleeding.CHEST: Denies HANSON, cyanosis, wheezing  CARDIOVASCULAR: Denies chest pain, PND, orthopnea or reduced exercise tolerance.  ABDOMEN:  No weight loss.No abdominal pain, no hematemesis or blood in stool.  URINARY: No flank pain, dysuria or hematuria.  PERIPHERAL VASCULAR: No claudication or  cyanosis.  MUSCULOSKELETAL: Negative   NEUROLOGIC: No history of seizures, paralysis, alteration of gait or coordination.    PE: Vital signs as noted  Heent:Normocephalic with no recent cranial trauma,PERRLA,EOMI,conjunctiva clear,fundi reveal no hemmorhage exudate or papilledema.Otic canals clear, tympanic membranes slightly dull bilaterally.Nasal mucosa slightly red and edematous.Posterior pharynx slightly red but without exudate.  Neck:Supple with minimal anterior cervical adenopathy.  Chest:Clear bilateral breath sounds with mild scattered ronchi  Heart:Regular rhthym without murmer  Abdomen:Soft, non tender,no masses, no hepatosplenomegalyExtremeties and Neurologic:Grossly within normal limits  Impression: Upper Respiratory Infection. 465.9  DM-last a1c 6.2   Asthma    Plan: Dexa 8im  If ftp advair   Rev rec DM

## 2018-03-01 RX ORDER — METFORMIN HYDROCHLORIDE 500 MG/1
TABLET ORAL
Qty: 180 TABLET | Refills: 2 | Status: SHIPPED | OUTPATIENT
Start: 2018-03-01 | End: 2018-11-29 | Stop reason: SDUPTHER

## 2018-06-14 ENCOUNTER — TELEPHONE (OUTPATIENT)
Dept: FAMILY MEDICINE | Facility: CLINIC | Age: 78
End: 2018-06-14

## 2018-06-14 DIAGNOSIS — E11.9 TYPE 2 DIABETES MELLITUS WITHOUT COMPLICATION, WITHOUT LONG-TERM CURRENT USE OF INSULIN: Primary | ICD-10-CM

## 2018-06-14 RX ORDER — ALPRAZOLAM 0.25 MG/1
TABLET ORAL
Qty: 90 TABLET | Refills: 4 | Status: SHIPPED | OUTPATIENT
Start: 2018-06-14 | End: 2018-10-18 | Stop reason: SDUPTHER

## 2018-06-14 NOTE — TELEPHONE ENCOUNTER
----- Message from Mark Chu MD sent at 6/14/2018  6:57 AM CDT -----  I sent xanax rf-pls tell her our policy but ok to wait until aroud Oct for fu visit and we can do lab etc

## 2018-09-30 RX ORDER — MONTELUKAST SODIUM 10 MG/1
TABLET ORAL
Qty: 30 TABLET | Refills: 11 | Status: SHIPPED | OUTPATIENT
Start: 2018-09-30 | End: 2020-02-20

## 2018-10-04 ENCOUNTER — PATIENT OUTREACH (OUTPATIENT)
Dept: ADMINISTRATIVE | Facility: HOSPITAL | Age: 78
End: 2018-10-04

## 2018-10-18 ENCOUNTER — LAB VISIT (OUTPATIENT)
Dept: LAB | Facility: HOSPITAL | Age: 78
End: 2018-10-18
Attending: FAMILY MEDICINE
Payer: MEDICARE

## 2018-10-18 ENCOUNTER — OFFICE VISIT (OUTPATIENT)
Dept: FAMILY MEDICINE | Facility: CLINIC | Age: 78
End: 2018-10-18
Payer: MEDICARE

## 2018-10-18 VITALS
DIASTOLIC BLOOD PRESSURE: 70 MMHG | HEART RATE: 64 BPM | SYSTOLIC BLOOD PRESSURE: 122 MMHG | BODY MASS INDEX: 26.93 KG/M2 | TEMPERATURE: 98 F | WEIGHT: 167.56 LBS | HEIGHT: 66 IN

## 2018-10-18 DIAGNOSIS — E11.69 TYPE 2 DIABETES MELLITUS WITH HYPERLIPIDEMIA: ICD-10-CM

## 2018-10-18 DIAGNOSIS — E78.5 TYPE 2 DIABETES MELLITUS WITH HYPERLIPIDEMIA: ICD-10-CM

## 2018-10-18 DIAGNOSIS — J06.9 UPPER RESPIRATORY TRACT INFECTION, UNSPECIFIED TYPE: Primary | ICD-10-CM

## 2018-10-18 DIAGNOSIS — I25.10 CORONARY ARTERY DISEASE, ANGINA PRESENCE UNSPECIFIED, UNSPECIFIED VESSEL OR LESION TYPE, UNSPECIFIED WHETHER NATIVE OR TRANSPLANTED HEART: ICD-10-CM

## 2018-10-18 DIAGNOSIS — Z12.39 SCREENING FOR BREAST CANCER: ICD-10-CM

## 2018-10-18 DIAGNOSIS — E11.9 TYPE 2 DIABETES MELLITUS WITHOUT COMPLICATION, WITHOUT LONG-TERM CURRENT USE OF INSULIN: ICD-10-CM

## 2018-10-18 DIAGNOSIS — F41.9 ANXIETY: ICD-10-CM

## 2018-10-18 LAB
ALBUMIN SERPL BCP-MCNC: 3.8 G/DL
ALP SERPL-CCNC: 66 U/L
ALT SERPL W/O P-5'-P-CCNC: 15 U/L
ANION GAP SERPL CALC-SCNC: 9 MMOL/L
AST SERPL-CCNC: 20 U/L
BASOPHILS # BLD AUTO: 0.03 K/UL
BASOPHILS NFR BLD: 0.4 %
BILIRUB SERPL-MCNC: 0.5 MG/DL
BUN SERPL-MCNC: 16 MG/DL
CALCIUM SERPL-MCNC: 9.9 MG/DL
CHLORIDE SERPL-SCNC: 106 MMOL/L
CHOLEST SERPL-MCNC: 138 MG/DL
CHOLEST/HDLC SERPL: 2 {RATIO}
CO2 SERPL-SCNC: 27 MMOL/L
CREAT SERPL-MCNC: 1 MG/DL
DIFFERENTIAL METHOD: ABNORMAL
EOSINOPHIL # BLD AUTO: 0.2 K/UL
EOSINOPHIL NFR BLD: 3.2 %
ERYTHROCYTE [DISTWIDTH] IN BLOOD BY AUTOMATED COUNT: 13.2 %
EST. GFR  (AFRICAN AMERICAN): >60 ML/MIN/1.73 M^2
EST. GFR  (NON AFRICAN AMERICAN): 54.5 ML/MIN/1.73 M^2
ESTIMATED AVG GLUCOSE: 140 MG/DL
GLUCOSE SERPL-MCNC: 114 MG/DL
HBA1C MFR BLD HPLC: 6.5 %
HCT VFR BLD AUTO: 40 %
HDLC SERPL-MCNC: 68 MG/DL
HDLC SERPL: 49.3 %
HGB BLD-MCNC: 12.7 G/DL
IMM GRANULOCYTES # BLD AUTO: 0.01 K/UL
IMM GRANULOCYTES NFR BLD AUTO: 0.1 %
LDLC SERPL CALC-MCNC: 52.4 MG/DL
LYMPHOCYTES # BLD AUTO: 2.4 K/UL
LYMPHOCYTES NFR BLD: 34.3 %
MCH RBC QN AUTO: 30.2 PG
MCHC RBC AUTO-ENTMCNC: 31.8 G/DL
MCV RBC AUTO: 95 FL
MONOCYTES # BLD AUTO: 0.8 K/UL
MONOCYTES NFR BLD: 11.9 %
NEUTROPHILS # BLD AUTO: 3.5 K/UL
NEUTROPHILS NFR BLD: 50.1 %
NONHDLC SERPL-MCNC: 70 MG/DL
NRBC BLD-RTO: 0 /100 WBC
PLATELET # BLD AUTO: 192 K/UL
PMV BLD AUTO: 11.5 FL
POTASSIUM SERPL-SCNC: 3.9 MMOL/L
PROT SERPL-MCNC: 6.9 G/DL
RBC # BLD AUTO: 4.21 M/UL
SODIUM SERPL-SCNC: 142 MMOL/L
TRIGL SERPL-MCNC: 88 MG/DL
WBC # BLD AUTO: 6.89 K/UL

## 2018-10-18 PROCEDURE — 83036 HEMOGLOBIN GLYCOSYLATED A1C: CPT

## 2018-10-18 PROCEDURE — 85025 COMPLETE CBC W/AUTO DIFF WBC: CPT

## 2018-10-18 PROCEDURE — 99999 PR PBB SHADOW E&M-EST. PATIENT-LVL III: CPT | Mod: PBBFAC,,, | Performed by: FAMILY MEDICINE

## 2018-10-18 PROCEDURE — 36415 COLL VENOUS BLD VENIPUNCTURE: CPT | Mod: PO

## 2018-10-18 PROCEDURE — 99213 OFFICE O/P EST LOW 20 MIN: CPT | Mod: PBBFAC,PO | Performed by: FAMILY MEDICINE

## 2018-10-18 PROCEDURE — 80061 LIPID PANEL: CPT

## 2018-10-18 PROCEDURE — 80053 COMPREHEN METABOLIC PANEL: CPT

## 2018-10-18 PROCEDURE — 99213 OFFICE O/P EST LOW 20 MIN: CPT | Mod: S$PBB,,, | Performed by: FAMILY MEDICINE

## 2018-10-18 RX ORDER — ALPRAZOLAM 0.25 MG/1
TABLET ORAL
Qty: 90 TABLET | Refills: 5 | Status: SHIPPED | OUTPATIENT
Start: 2018-10-18 | End: 2019-10-02 | Stop reason: SDUPTHER

## 2018-10-18 NOTE — PROGRESS NOTES
Alon Chatman presents with moderate upper respiratory congestion,rhinnorhea,moderate cough past 2-3 days. OTC help slightly. Denies nausea,vomiting,diarrhea or significant fever.    Past Medical History:   Diagnosis Date    Allergy     Anxiety     Asthma     DM (diabetes mellitus)     IBS (irritable bowel syndrome)     Migraine headache     Mitral valve prolapse     Type 2 diabetes mellitus with hyperlipidemia 9/6/2016     Past Surgical History:   Procedure Laterality Date    BREAST BIOPSY      BREAST LUMPECTOMY      HYSTERECTOMY      partial    [place] COLONOSCOPY,REMV LESN,FORCEP/CAUTERY  8/23/2012          Review of patient's allergies indicates:   Allergen Reactions    Codeine      Other reaction(s): Unknown  unknown    Diclofenac Nausea And Vomiting    Doxycycline      Other reaction(s): Unknown  unknown    Iodinated contrast- oral and iv dye Hives     Pt reports rxn to iv dye years ago,but no rxn to oral contrast    Sulfa (sulfonamide antibiotics)      Other reaction(s): Unknown  unknown     Current Outpatient Medications on File Prior to Visit   Medication Sig Dispense Refill    ALPRAZolam (XANAX) 0.25 MG tablet TAKE 1 TABLET BY MOUTH 4 TIMES DAILY AS NEEDED  90 tablet 4    amitriptyline (ELAVIL) 50 MG tablet Take 1 tablet (50 mg total) by mouth every evening. 30 tablet 11    aspirin (ECOTRIN) 81 MG EC tablet Take 81 mg by mouth once daily.      atorvastatin (LIPITOR) 80 MG tablet Take 80 mg by mouth.      blood sugar diagnostic (CONTOUR TEST STRIPS) Strp Use to test blood glucose twice daily 200 each 12    clopidogrel (PLAVIX) 75 mg tablet Take 75 mg by mouth once daily.      colesevelam (WELCHOL) 625 mg tablet Take 3 tablets (1,875 mg total) by mouth 2 (two) times daily with meals. (Patient taking differently: Take 1,875 mg by mouth once daily. ) 150 tablet 11    fluticasone-salmeterol 100-50 mcg/dose (ADVAIR) 100-50 mcg/dose diskus inhaler Inhale 1 puff into the lungs 2 (two)  times daily. Controller 60 each 11    furosemide (LASIX) 20 MG tablet Take 20 mg by mouth daily as needed.      hyoscyamine (ANASPAZ,LEVSIN) 0.125 mg Tab Take 1 tablet (125 mcg total) by mouth every 6 (six) hours as needed. 40 tablet 11    levalbuterol (XOPENEX HFA) 45 mcg/actuation inhaler Inhale 1-2 puffs into the lungs every 4 (four) hours as needed for Wheezing. 15 g 6    metFORMIN (GLUCOPHAGE) 500 MG tablet TAKE 1 TABLET BY MOUTH TWICE DAILY  180 tablet 2    metoprolol succinate (TOPROL-XL) 100 MG 24 hr tablet Take 50 mg by mouth once daily.       MICROLET LANCET Misc test blood glucose once daily 100 each 10    montelukast (SINGULAIR) 10 mg tablet TAKE 1 TABLET BY MOUTH ONCE DAILY 30 tablet 11    ranitidine (ZANTAC) 300 MG tablet Take 1 tablet (300 mg total) by mouth every evening. 30 tablet 6     No current facility-administered medications on file prior to visit.      Social History     Socioeconomic History    Marital status:      Spouse name: Not on file    Number of children: Not on file    Years of education: Not on file    Highest education level: Not on file   Social Needs    Financial resource strain: Not on file    Food insecurity - worry: Not on file    Food insecurity - inability: Not on file    Transportation needs - medical: Not on file    Transportation needs - non-medical: Not on file   Occupational History    Not on file   Tobacco Use    Smoking status: Never Smoker   Substance and Sexual Activity    Alcohol use: No    Drug use: No    Sexual activity: Not on file   Other Topics Concern    Not on file   Social History Narrative    Not on file     Family History   Problem Relation Age of Onset    Heart disease Mother     Diabetes Brother     Heart disease Brother     Early death Brother     Breast cancer Maternal Aunt          ROS:  SKIN: No rashes, itching or changes in color or texture of skin.  EYES: Visual acuity fine. No photophobia, ocular pain or  diplopia.EARS: Denies ear pain, discharge or vertigo.NOSE: No loss of smell, no epistaxis some postnasal drip.MOUTH & THROAT: No hoarseness or change in voice. No excessive gum bleeding.CHEST: Denies HANSON, cyanosis, wheezing  CARDIOVASCULAR: Denies chest pain, PND, orthopnea or reduced exercise tolerance.  ABDOMEN:  No weight loss.No abdominal pain, no hematemesis or blood in stool.  URINARY: No flank pain, dysuria or hematuria.  PERIPHERAL VASCULAR: No claudication or cyanosis.  MUSCULOSKELETAL: Negative   NEUROLOGIC: No history of seizures, paralysis, alteration of gait or coordination.    PE: Vital signs as noted  Heent:Normocephalic with no recent cranial trauma,PERRLA,EOMI,conjunctiva clear,fundi reveal no hemmorhage exudate or papilledema.Otic canals clear, tympanic membranes slightly dull bilaterally.Nasal mucosa slightly red and edematous.Posterior pharynx slightly red but without exudate.  Neck:Supple with minimal anterior cervical adenopathy.  Chest:Clear bilateral breath sounds with mild scattered ronchi  Heart:Regular rhthym without murmer  Abdomen:Soft, non tender,no masses, no hepatosplenomegalyExtremeties and Neurologic:Grossly within normal limits  Impression: Upper Respiratory Infection. 465.9  DM  CAD  Anxiety  Plan: H1H2 and montelukast

## 2018-10-22 ENCOUNTER — TELEPHONE (OUTPATIENT)
Dept: FAMILY MEDICINE | Facility: CLINIC | Age: 78
End: 2018-10-22

## 2018-10-22 DIAGNOSIS — E11.8 TYPE 2 DIABETES MELLITUS WITH COMPLICATION, UNSPECIFIED WHETHER LONG TERM INSULIN USE: Primary | ICD-10-CM

## 2018-10-22 DIAGNOSIS — Z12.31 SCREENING MAMMOGRAM, ENCOUNTER FOR: Primary | ICD-10-CM

## 2018-10-22 NOTE — TELEPHONE ENCOUNTER
----- Message from Janice Lopes sent at 10/22/2018 10:39 AM CDT -----  Contact: pt  Pt would like nurse contact her regarding orders for a Mammogram.

## 2018-10-29 ENCOUNTER — HOSPITAL ENCOUNTER (OUTPATIENT)
Dept: RADIOLOGY | Facility: HOSPITAL | Age: 78
Discharge: HOME | End: 2018-10-29
Attending: FAMILY MEDICINE
Payer: MEDICARE

## 2018-10-29 VITALS — WEIGHT: 167.56 LBS | BODY MASS INDEX: 26.93 KG/M2 | HEIGHT: 66 IN

## 2018-10-29 DIAGNOSIS — Z12.31 SCREENING MAMMOGRAM, ENCOUNTER FOR: ICD-10-CM

## 2018-10-29 PROCEDURE — 77067 SCR MAMMO BI INCL CAD: CPT | Mod: TC,PO

## 2018-10-29 PROCEDURE — 77063 BREAST TOMOSYNTHESIS BI: CPT | Mod: 26,,, | Performed by: RADIOLOGY

## 2018-10-29 PROCEDURE — 77067 SCR MAMMO BI INCL CAD: CPT | Mod: 26,,, | Performed by: RADIOLOGY

## 2018-10-29 PROCEDURE — 77063 BREAST TOMOSYNTHESIS BI: CPT | Mod: TC,PO

## 2018-10-29 RX ORDER — AMITRIPTYLINE HYDROCHLORIDE 50 MG/1
TABLET, FILM COATED ORAL
Qty: 30 TABLET | Refills: 12 | Status: SHIPPED | OUTPATIENT
Start: 2018-10-29 | End: 2019-10-02 | Stop reason: SDUPTHER

## 2018-10-30 ENCOUNTER — IMMUNIZATION (OUTPATIENT)
Dept: FAMILY MEDICINE | Facility: CLINIC | Age: 78
End: 2018-10-30
Payer: MEDICARE

## 2018-10-30 PROCEDURE — 90662 IIV NO PRSV INCREASED AG IM: CPT | Mod: PBBFAC,PO

## 2018-11-05 ENCOUNTER — TELEPHONE (OUTPATIENT)
Dept: FAMILY MEDICINE | Facility: CLINIC | Age: 78
End: 2018-11-05

## 2018-11-05 NOTE — TELEPHONE ENCOUNTER
----- Message from Maria Del Carmen Keenan sent at 11/5/2018  2:22 PM CST -----  Contact: pt   Pt states that she need all her testes that was done last week including her labs sent to her Cardio doctor.    [phone] (home)

## 2018-11-29 RX ORDER — METFORMIN HYDROCHLORIDE 500 MG/1
TABLET ORAL
Qty: 180 TABLET | Refills: 4 | Status: SHIPPED | OUTPATIENT
Start: 2018-11-29 | End: 2019-12-09 | Stop reason: SDUPTHER

## 2019-02-28 ENCOUNTER — TELEPHONE (OUTPATIENT)
Dept: FAMILY MEDICINE | Facility: CLINIC | Age: 79
End: 2019-02-28

## 2019-02-28 ENCOUNTER — OFFICE VISIT (OUTPATIENT)
Dept: FAMILY MEDICINE | Facility: CLINIC | Age: 79
End: 2019-02-28
Payer: MEDICARE

## 2019-02-28 VITALS
SYSTOLIC BLOOD PRESSURE: 128 MMHG | HEIGHT: 66 IN | BODY MASS INDEX: 27 KG/M2 | WEIGHT: 168 LBS | TEMPERATURE: 98 F | HEART RATE: 56 BPM | DIASTOLIC BLOOD PRESSURE: 54 MMHG

## 2019-02-28 DIAGNOSIS — R39.89 POSSIBLE URINARY TRACT INFECTION: ICD-10-CM

## 2019-02-28 DIAGNOSIS — N39.0 URINARY TRACT INFECTION WITHOUT HEMATURIA, SITE UNSPECIFIED: Primary | ICD-10-CM

## 2019-02-28 LAB
BACTERIA #/AREA URNS HPF: ABNORMAL /HPF
BILIRUB UR QL STRIP: NEGATIVE
CLARITY UR: ABNORMAL
COLOR UR: YELLOW
GLUCOSE UR QL STRIP: NEGATIVE
HGB UR QL STRIP: ABNORMAL
KETONES UR QL STRIP: NEGATIVE
LEUKOCYTE ESTERASE UR QL STRIP: ABNORMAL
MICROSCOPIC COMMENT: ABNORMAL
NITRITE UR QL STRIP: POSITIVE
PH UR STRIP: 6 [PH] (ref 5–8)
PROT UR QL STRIP: NEGATIVE
RBC #/AREA URNS HPF: 2 /HPF (ref 0–4)
SP GR UR STRIP: 1.02 (ref 1–1.03)
SQUAMOUS #/AREA URNS HPF: 8 /HPF
URN SPEC COLLECT METH UR: ABNORMAL
WBC #/AREA URNS HPF: >100 /HPF (ref 0–5)

## 2019-02-28 PROCEDURE — 99213 PR OFFICE/OUTPT VISIT, EST, LEVL III, 20-29 MIN: ICD-10-PCS | Mod: S$PBB,,, | Performed by: NURSE PRACTITIONER

## 2019-02-28 PROCEDURE — 99213 OFFICE O/P EST LOW 20 MIN: CPT | Mod: S$PBB,,, | Performed by: NURSE PRACTITIONER

## 2019-02-28 PROCEDURE — 99215 OFFICE O/P EST HI 40 MIN: CPT | Mod: PBBFAC,PO | Performed by: NURSE PRACTITIONER

## 2019-02-28 PROCEDURE — 81000 URINALYSIS NONAUTO W/SCOPE: CPT | Mod: PO

## 2019-02-28 PROCEDURE — 87086 URINE CULTURE/COLONY COUNT: CPT

## 2019-02-28 PROCEDURE — 99999 PR PBB SHADOW E&M-EST. PATIENT-LVL V: ICD-10-PCS | Mod: PBBFAC,,, | Performed by: NURSE PRACTITIONER

## 2019-02-28 PROCEDURE — 87088 URINE BACTERIA CULTURE: CPT

## 2019-02-28 PROCEDURE — 99999 PR PBB SHADOW E&M-EST. PATIENT-LVL V: CPT | Mod: PBBFAC,,, | Performed by: NURSE PRACTITIONER

## 2019-02-28 PROCEDURE — 87077 CULTURE AEROBIC IDENTIFY: CPT

## 2019-02-28 PROCEDURE — 87186 SC STD MICRODIL/AGAR DIL: CPT

## 2019-02-28 RX ORDER — PHENAZOPYRIDINE HYDROCHLORIDE 200 MG/1
200 TABLET, FILM COATED ORAL 2 TIMES DAILY PRN
Qty: 6 TABLET | Refills: 0 | Status: SHIPPED | OUTPATIENT
Start: 2019-02-28 | End: 2019-03-03

## 2019-02-28 RX ORDER — NITROFURANTOIN 25; 75 MG/1; MG/1
100 CAPSULE ORAL 2 TIMES DAILY
Qty: 20 CAPSULE | Refills: 0 | Status: SHIPPED | OUTPATIENT
Start: 2019-02-28 | End: 2019-03-10

## 2019-02-28 NOTE — TELEPHONE ENCOUNTER
----- Message from Ria Perez sent at 2/28/2019  9:39 AM CST -----  Contact: self- [phone]  Would like to consult with the nurse, patients would like to know if she can get something call in, patients has a bladder infection and would like to get something as soon as possible, patients also would like to know  if she can get a urine sample, also would like to speak with the nurse, thanks sj

## 2019-02-28 NOTE — PROGRESS NOTES
Subjective:       Patient ID: Alon Chatman is a 78 y.o. female.    Chief Complaint: Urinary Tract Infection    Dysuria    This is a new problem. The current episode started in the past 7 days. The problem occurs every urination. The problem has been unchanged. The quality of the pain is described as burning. The pain is mild. There has been no fever. She is not sexually active. There is no history of pyelonephritis. Associated symptoms include frequency. Pertinent negatives include no behavior changes, chills, discharge, flank pain, hematuria, hesitancy, nausea, possible pregnancy, sweats, urgency, vomiting, weight loss, bubble bath use, constipation, rash or withholding. She has tried nothing for the symptoms. The treatment provided no relief. Her past medical history is significant for diabetes mellitus, hypertension and recurrent UTIs. There is no history of catheterization, diabetes insipidus, genitourinary reflux, kidney stones, a single kidney, STD, urinary stasis or a urological procedure.     Past Medical History:   Diagnosis Date    Allergy     Anxiety     Asthma     Coronary artery disease 10/18/2018    DM (diabetes mellitus)     IBS (irritable bowel syndrome)     Migraine headache     Mitral valve prolapse     Type 2 diabetes mellitus with hyperlipidemia 9/6/2016     Social History     Socioeconomic History    Marital status:      Spouse name: Not on file    Number of children: Not on file    Years of education: Not on file    Highest education level: Not on file   Social Needs    Financial resource strain: Not on file    Food insecurity - worry: Not on file    Food insecurity - inability: Not on file    Transportation needs - medical: Not on file    Transportation needs - non-medical: Not on file   Occupational History    Not on file   Tobacco Use    Smoking status: Never Smoker   Substance and Sexual Activity    Alcohol use: No    Drug use: No    Sexual activity: Not  on file   Other Topics Concern    Not on file   Social History Narrative    Not on file     Past Surgical History:   Procedure Laterality Date    BREAST BIOPSY      BREAST LUMPECTOMY      HYSTERECTOMY      partial    [place] COLONOSCOPY,REMV JOHANNE HINDS/CAUTERY  8/23/2012            Review of Systems   Constitutional: Negative.  Negative for chills and weight loss.   HENT: Negative.    Eyes: Negative.    Respiratory: Negative.    Cardiovascular: Negative.    Gastrointestinal: Negative.  Negative for constipation, nausea and vomiting.   Endocrine: Negative.    Genitourinary: Positive for dysuria and frequency. Negative for flank pain, hematuria, hesitancy and urgency.   Musculoskeletal: Negative.    Skin: Negative.  Negative for rash.   Allergic/Immunologic: Negative.    Neurological: Negative.    Psychiatric/Behavioral: Negative.        Objective:      Physical Exam   Constitutional: She is oriented to person, place, and time. She appears well-developed and well-nourished.   HENT:   Head: Normocephalic.   Right Ear: External ear normal.   Left Ear: External ear normal.   Nose: Nose normal.   Mouth/Throat: Oropharynx is clear and moist.   Eyes: Conjunctivae are normal. Pupils are equal, round, and reactive to light.   Neck: Normal range of motion. Neck supple.   Cardiovascular: Normal rate, regular rhythm and normal heart sounds.   Pulmonary/Chest: Effort normal and breath sounds normal.   Abdominal: Soft. Bowel sounds are normal.   Musculoskeletal: Normal range of motion.   Neurological: She is alert and oriented to person, place, and time.   Skin: Skin is warm and dry. Capillary refill takes 2 to 3 seconds.   Psychiatric: She has a normal mood and affect. Her behavior is normal. Judgment and thought content normal.   Nursing note and vitals reviewed.      Assessment:       1. Urinary tract infection without hematuria, site unspecified    2. Possible urinary tract infection        Plan:         Alon was seen  today for urinary tract infection.    Diagnoses and all orders for this visit:    Urinary tract infection without hematuria, site unspecified  Possible urinary tract infection  -     URINALYSIS  -     Urine culture  -     Urinalysis Microscopic  -     nitrofurantoin, macrocrystal-monohydrate, (MACROBID) 100 MG capsule; Take 1 capsule (100 mg total) by mouth 2 (two) times daily. for 10 days  -     phenazopyridine (PYRIDIUM) 200 MG tablet; Take 1 tablet (200 mg total) by mouth 2 (two) times daily as needed.  Report to ER immediately if symptoms worsen

## 2019-03-03 LAB — BACTERIA UR CULT: NORMAL

## 2019-03-06 ENCOUNTER — TELEPHONE (OUTPATIENT)
Dept: FAMILY MEDICINE | Facility: CLINIC | Age: 79
End: 2019-03-06

## 2019-03-06 RX ORDER — PHENAZOPYRIDINE HYDROCHLORIDE 200 MG/1
200 TABLET, FILM COATED ORAL 3 TIMES DAILY PRN
Status: CANCELLED | OUTPATIENT
Start: 2019-03-06 | End: 2019-03-16

## 2019-03-06 NOTE — TELEPHONE ENCOUNTER
----- Message from Shayla Gabriel sent at 12/28/2017  8:05 AM CST -----  Regarding: lab orders  Patient is having flb lab on Sun 12/31 and need orders please.  Thanks!   ----- Message from Tiffanie East sent at 3/6/2019 12:16 PM CST -----  Contact: pt  .Type:  Patient Returning Call    Who Called:pt  Who Left Message for Patient: nurse  Does the patient know what this is regarding?: ...  Would the patient rather a call back or a response via [facility]? Call back  Best Call Back Number: [phone] (home)   Additional Information:  pls call pt back

## 2019-03-06 NOTE — TELEPHONE ENCOUNTER
----- Message from Felicia Guzman sent at 3/6/2019 11:25 AM CST -----  ..Type:  Patient Returning Call    Who Called:patient  Who Left Message for Patient:n/a  Does the patient know what this is regarding?:n/a  Would the patient rather a call back or a response via [facility]? Call back  Best Call Back Number:..[phone] (home)     Additional Information:

## 2019-03-06 NOTE — TELEPHONE ENCOUNTER
Patient is requesting uribel, states it works better for her than pyridium. Patient saw Debora 2/28/19 for UTI.

## 2019-10-02 RX ORDER — AMITRIPTYLINE HYDROCHLORIDE 50 MG/1
50 TABLET, FILM COATED ORAL NIGHTLY
Qty: 90 TABLET | Refills: 4 | Status: SHIPPED | OUTPATIENT
Start: 2019-10-02 | End: 2020-12-21

## 2019-10-02 RX ORDER — ALPRAZOLAM 0.25 MG/1
TABLET ORAL
Qty: 90 TABLET | Refills: 5 | Status: SHIPPED | OUTPATIENT
Start: 2019-10-02

## 2019-10-23 ENCOUNTER — TELEPHONE (OUTPATIENT)
Dept: FAMILY MEDICINE | Facility: CLINIC | Age: 79
End: 2019-10-23

## 2019-10-23 DIAGNOSIS — Z12.31 SCREENING MAMMOGRAM, ENCOUNTER FOR: Primary | ICD-10-CM

## 2019-10-23 NOTE — TELEPHONE ENCOUNTER
----- Message from Jonna Liang sent at 10/23/2019 10:05 AM CDT -----  Contact: Pt  Pt is requesting call back in regards to getting order for mammo.          Pls call back at [phone]

## 2019-10-25 ENCOUNTER — IMMUNIZATION (OUTPATIENT)
Dept: FAMILY MEDICINE | Facility: CLINIC | Age: 79
End: 2019-10-25
Payer: MEDICARE

## 2019-10-25 PROCEDURE — 99999 PR PBB SHADOW E&M-EST. PATIENT-LVL II: ICD-10-PCS | Mod: PBBFAC,,,

## 2019-10-25 PROCEDURE — 90662 IIV NO PRSV INCREASED AG IM: CPT | Mod: PBBFAC,PO

## 2019-10-25 PROCEDURE — 99999 PR PBB SHADOW E&M-EST. PATIENT-LVL II: CPT | Mod: PBBFAC,,,

## 2019-10-25 PROCEDURE — 99212 OFFICE O/P EST SF 10 MIN: CPT | Mod: PBBFAC,PO

## 2019-10-30 ENCOUNTER — LAB VISIT (OUTPATIENT)
Dept: LAB | Facility: HOSPITAL | Age: 79
End: 2019-10-30
Attending: FAMILY MEDICINE
Payer: MEDICARE

## 2019-10-30 ENCOUNTER — HOSPITAL ENCOUNTER (OUTPATIENT)
Dept: RADIOLOGY | Facility: HOSPITAL | Age: 79
Discharge: HOME | End: 2019-10-30
Attending: FAMILY MEDICINE
Payer: MEDICARE

## 2019-10-30 VITALS — HEIGHT: 66 IN | WEIGHT: 168 LBS | BODY MASS INDEX: 27 KG/M2

## 2019-10-30 DIAGNOSIS — R30.0 DYSURIA: ICD-10-CM

## 2019-10-30 DIAGNOSIS — R30.0 DYSURIA: Primary | ICD-10-CM

## 2019-10-30 DIAGNOSIS — Z12.31 SCREENING MAMMOGRAM, ENCOUNTER FOR: ICD-10-CM

## 2019-10-30 LAB
BACTERIA #/AREA URNS HPF: ABNORMAL /HPF
BILIRUB UR QL STRIP: ABNORMAL
CLARITY UR: CLEAR
COLOR UR: ABNORMAL
GLUCOSE UR QL STRIP: ABNORMAL
HGB UR QL STRIP: ABNORMAL
KETONES UR QL STRIP: ABNORMAL
LEUKOCYTE ESTERASE UR QL STRIP: ABNORMAL
MICROSCOPIC COMMENT: ABNORMAL
NITRITE UR QL STRIP: ABNORMAL
PH UR STRIP: ABNORMAL [PH] (ref 5–8)
PROT UR QL STRIP: ABNORMAL
RBC #/AREA URNS HPF: 8 /HPF (ref 0–4)
SP GR UR STRIP: ABNORMAL (ref 1–1.03)
SQUAMOUS #/AREA URNS HPF: 3 /HPF
URN SPEC COLLECT METH UR: ABNORMAL
WBC #/AREA URNS HPF: >100 /HPF (ref 0–5)

## 2019-10-30 PROCEDURE — 77067 SCR MAMMO BI INCL CAD: CPT | Mod: 26,,, | Performed by: RADIOLOGY

## 2019-10-30 PROCEDURE — 77063 BREAST TOMOSYNTHESIS BI: CPT | Mod: 26,,, | Performed by: RADIOLOGY

## 2019-10-30 PROCEDURE — 81000 URINALYSIS NONAUTO W/SCOPE: CPT | Mod: PO

## 2019-10-30 PROCEDURE — 77063 MAMMO DIGITAL SCREENING BILAT WITH TOMOSYNTHESIS_CAD: ICD-10-PCS | Mod: 26,,, | Performed by: RADIOLOGY

## 2019-10-30 PROCEDURE — 77067 MAMMO DIGITAL SCREENING BILAT WITH TOMOSYNTHESIS_CAD: ICD-10-PCS | Mod: 26,,, | Performed by: RADIOLOGY

## 2019-10-30 PROCEDURE — 77067 SCR MAMMO BI INCL CAD: CPT | Mod: TC,PO

## 2019-10-30 RX ORDER — CEPHALEXIN 500 MG/1
500 CAPSULE ORAL EVERY 12 HOURS
Qty: 14 CAPSULE | Refills: 0 | Status: SHIPPED | OUTPATIENT
Start: 2019-10-30 | End: 2020-02-20 | Stop reason: ALTCHOICE

## 2019-11-18 ENCOUNTER — TELEPHONE (OUTPATIENT)
Dept: FAMILY MEDICINE | Facility: CLINIC | Age: 79
End: 2019-11-18

## 2019-11-18 RX ORDER — NITROFURANTOIN 25; 75 MG/1; MG/1
100 CAPSULE ORAL 2 TIMES DAILY
Qty: 20 CAPSULE | Refills: 1 | Status: SHIPPED | OUTPATIENT
Start: 2019-11-18 | End: 2020-02-20 | Stop reason: ALTCHOICE

## 2019-11-18 NOTE — TELEPHONE ENCOUNTER
----- Message from Tiffanie East sent at 11/18/2019  9:50 AM CST -----  Contact: pt  She's calling in regards to medication change       pls call pt back at [phone] (home)

## 2019-12-05 ENCOUNTER — TELEPHONE (OUTPATIENT)
Dept: FAMILY MEDICINE | Facility: CLINIC | Age: 79
End: 2019-12-05

## 2019-12-05 RX ORDER — NITROFURANTOIN (MACROCRYSTALS) 100 MG/1
100 CAPSULE ORAL EVERY 12 HOURS
Qty: 20 CAPSULE | Refills: 1 | Status: SHIPPED | OUTPATIENT
Start: 2019-12-05 | End: 2020-05-28 | Stop reason: SDUPTHER

## 2019-12-05 NOTE — TELEPHONE ENCOUNTER
Pt states you have given her keflex recently for her UTI but the infection has come back again. Asking of she can get nitrofurantoin,sent in because that seems to work the best to clear the infection

## 2019-12-05 NOTE — TELEPHONE ENCOUNTER
----- Message from Marleen Draper sent at 12/5/2019  8:18 AM CST -----  Contact: PATIENT  Type:  Needs Medical Advice    Who Called: PATIENT  Symptoms (please be specific): UTI   How long has patient had these symptoms:  POST OV 10/30 CHANGED MEDS  Pharmacy name and phone #:    [id number] - [place] - [address]  [address] [address]  Phone: [phone] Fax: [phone]    Would the patient rather a call back or a response via [facility]? CALL  Best Call Back Number: [phone]  Additional Information: PLEASE CALL PATIENT ASAP TODAY

## 2019-12-10 RX ORDER — METFORMIN HYDROCHLORIDE 500 MG/1
TABLET ORAL
Qty: 180 TABLET | Refills: 0 | Status: SHIPPED | OUTPATIENT
Start: 2019-12-10 | End: 2020-01-17 | Stop reason: SDUPTHER

## 2020-01-17 NOTE — TELEPHONE ENCOUNTER
----- Message from Bk Benjamin sent at 1/17/2020  1:11 PM CST -----  Contact: Pt  Please give pt a call at .[phone] (home) regarding a update on her refill for diabetic supplies and medication

## 2020-01-18 RX ORDER — METFORMIN HYDROCHLORIDE 500 MG/1
500 TABLET ORAL 2 TIMES DAILY
Qty: 180 TABLET | Refills: 0 | Status: SHIPPED | OUTPATIENT
Start: 2020-01-18 | End: 2020-06-24

## 2020-01-18 RX ORDER — LANCETS
EACH MISCELLANEOUS
Qty: 100 EACH | Refills: 10 | Status: SHIPPED | OUTPATIENT
Start: 2020-01-18 | End: 2021-05-27

## 2020-01-20 ENCOUNTER — TELEPHONE (OUTPATIENT)
Dept: FAMILY MEDICINE | Facility: CLINIC | Age: 80
End: 2020-01-20

## 2020-01-20 NOTE — TELEPHONE ENCOUNTER
----- Message from Devi Dunlap sent at 1/20/2020  9:16 AM CST -----  Contact: pt   Would like to consult with nurse to f/u on medication refill, please give a call back at [phone].            Thanks,  Devi NUÑEZ

## 2020-02-19 ENCOUNTER — TELEPHONE (OUTPATIENT)
Dept: FAMILY MEDICINE | Facility: CLINIC | Age: 80
End: 2020-02-19

## 2020-02-19 NOTE — TELEPHONE ENCOUNTER
----- Message from Flynn Boyd sent at 2/19/2020  8:20 AM CST -----  Contact: Self- [phone]  Pt is requesting order to be out in for blood work , as well she would like to know if  can see her for a bladder infection. Please call back at [phone].       Thank You,   Flynn Boyd

## 2020-02-20 ENCOUNTER — OFFICE VISIT (OUTPATIENT)
Dept: FAMILY MEDICINE | Facility: CLINIC | Age: 80
End: 2020-02-20
Payer: MEDICARE

## 2020-02-20 ENCOUNTER — LAB VISIT (OUTPATIENT)
Dept: LAB | Facility: HOSPITAL | Age: 80
End: 2020-02-20
Attending: INTERNAL MEDICINE
Payer: MEDICARE

## 2020-02-20 VITALS
HEART RATE: 71 BPM | DIASTOLIC BLOOD PRESSURE: 61 MMHG | TEMPERATURE: 98 F | HEIGHT: 67 IN | WEIGHT: 164 LBS | BODY MASS INDEX: 25.74 KG/M2 | SYSTOLIC BLOOD PRESSURE: 121 MMHG

## 2020-02-20 DIAGNOSIS — Z78.0 ASYMPTOMATIC AGE-RELATED POSTMENOPAUSAL STATE: ICD-10-CM

## 2020-02-20 DIAGNOSIS — G43.009 MIGRAINE WITHOUT AURA AND WITHOUT STATUS MIGRAINOSUS, NOT INTRACTABLE: ICD-10-CM

## 2020-02-20 DIAGNOSIS — K58.0 IRRITABLE BOWEL SYNDROME WITH DIARRHEA: ICD-10-CM

## 2020-02-20 DIAGNOSIS — E78.5 TYPE 2 DIABETES MELLITUS WITH HYPERLIPIDEMIA: Primary | ICD-10-CM

## 2020-02-20 DIAGNOSIS — I25.10 CORONARY ARTERY DISEASE, ANGINA PRESENCE UNSPECIFIED, UNSPECIFIED VESSEL OR LESION TYPE, UNSPECIFIED WHETHER NATIVE OR TRANSPLANTED HEART: ICD-10-CM

## 2020-02-20 DIAGNOSIS — E11.69 TYPE 2 DIABETES MELLITUS WITH HYPERLIPIDEMIA: Primary | ICD-10-CM

## 2020-02-20 DIAGNOSIS — F41.9 ANXIETY: ICD-10-CM

## 2020-02-20 DIAGNOSIS — E11.69 TYPE 2 DIABETES MELLITUS WITH HYPERLIPIDEMIA: ICD-10-CM

## 2020-02-20 DIAGNOSIS — E78.5 TYPE 2 DIABETES MELLITUS WITH HYPERLIPIDEMIA: ICD-10-CM

## 2020-02-20 DIAGNOSIS — M70.62 TROCHANTERIC BURSITIS OF LEFT HIP: ICD-10-CM

## 2020-02-20 DIAGNOSIS — Z23 NEED FOR PNEUMOCOCCAL VACCINE: ICD-10-CM

## 2020-02-20 LAB
ALBUMIN SERPL BCP-MCNC: 4 G/DL (ref 3.5–5.2)
ALBUMIN/CREAT UR: 29.4 UG/MG (ref 0–30)
ALP SERPL-CCNC: 70 U/L (ref 55–135)
ALT SERPL W/O P-5'-P-CCNC: 19 U/L (ref 10–44)
ANION GAP SERPL CALC-SCNC: 10 MMOL/L (ref 8–16)
AST SERPL-CCNC: 20 U/L (ref 10–40)
BILIRUB SERPL-MCNC: 0.5 MG/DL (ref 0.1–1)
BUN SERPL-MCNC: 16 MG/DL (ref 8–23)
CALCIUM SERPL-MCNC: 9.8 MG/DL (ref 8.7–10.5)
CHLORIDE SERPL-SCNC: 103 MMOL/L (ref 95–110)
CHOLEST SERPL-MCNC: 151 MG/DL (ref 120–199)
CHOLEST/HDLC SERPL: 2 {RATIO} (ref 2–5)
CO2 SERPL-SCNC: 27 MMOL/L (ref 23–29)
CREAT SERPL-MCNC: 0.9 MG/DL (ref 0.5–1.4)
CREAT UR-MCNC: 85 MG/DL (ref 15–325)
EST. GFR  (AFRICAN AMERICAN): >60 ML/MIN/1.73 M^2
EST. GFR  (NON AFRICAN AMERICAN): >60 ML/MIN/1.73 M^2
ESTIMATED AVG GLUCOSE: 151 MG/DL (ref 68–131)
GLUCOSE SERPL-MCNC: 119 MG/DL (ref 70–110)
HBA1C MFR BLD HPLC: 6.9 % (ref 4–5.6)
HDLC SERPL-MCNC: 75 MG/DL (ref 40–75)
HDLC SERPL: 49.7 % (ref 20–50)
LDLC SERPL CALC-MCNC: 55 MG/DL (ref 63–159)
MICROALBUMIN UR DL<=1MG/L-MCNC: 25 UG/ML
NONHDLC SERPL-MCNC: 76 MG/DL
POTASSIUM SERPL-SCNC: 4.5 MMOL/L (ref 3.5–5.1)
PROT SERPL-MCNC: 7.4 G/DL (ref 6–8.4)
SODIUM SERPL-SCNC: 140 MMOL/L (ref 136–145)
TRIGL SERPL-MCNC: 105 MG/DL (ref 30–150)

## 2020-02-20 PROCEDURE — 80053 COMPREHEN METABOLIC PANEL: CPT

## 2020-02-20 PROCEDURE — 99214 OFFICE O/P EST MOD 30 MIN: CPT | Mod: S$PBB,,, | Performed by: INTERNAL MEDICINE

## 2020-02-20 PROCEDURE — 99999 PR PBB SHADOW E&M-EST. PATIENT-LVL IV: ICD-10-PCS | Mod: PBBFAC,,, | Performed by: INTERNAL MEDICINE

## 2020-02-20 PROCEDURE — 99999 PR PBB SHADOW E&M-EST. PATIENT-LVL IV: CPT | Mod: PBBFAC,,, | Performed by: INTERNAL MEDICINE

## 2020-02-20 PROCEDURE — 36415 COLL VENOUS BLD VENIPUNCTURE: CPT | Mod: PO

## 2020-02-20 PROCEDURE — 82570 ASSAY OF URINE CREATININE: CPT

## 2020-02-20 PROCEDURE — 99214 OFFICE O/P EST MOD 30 MIN: CPT | Mod: PBBFAC,PO,25 | Performed by: INTERNAL MEDICINE

## 2020-02-20 PROCEDURE — 80061 LIPID PANEL: CPT

## 2020-02-20 PROCEDURE — 83036 HEMOGLOBIN GLYCOSYLATED A1C: CPT

## 2020-02-20 PROCEDURE — 99214 PR OFFICE/OUTPT VISIT, EST, LEVL IV, 30-39 MIN: ICD-10-PCS | Mod: S$PBB,,, | Performed by: INTERNAL MEDICINE

## 2020-02-20 PROCEDURE — G0009 ADMIN PNEUMOCOCCAL VACCINE: HCPCS | Mod: PBBFAC,PO

## 2020-02-20 RX ORDER — COLESEVELAM 180 1/1
1875 TABLET ORAL DAILY
Qty: 150 TABLET | Refills: 11 | Status: SHIPPED | OUTPATIENT
Start: 2020-02-20 | End: 2020-04-30 | Stop reason: ALTCHOICE

## 2020-02-20 NOTE — PROGRESS NOTES
Assessment/Plan:    Migraine without aura and without status migrainosus, not intractable  -Hx of chronic migraines  -Currently on elavil 50 mg nightly with prevention of headaches  -Tolerating medication without adverse effects    Type 2 diabetes mellitus with hyperlipidemia  Last A1C   Lab Results   Component Value Date    HGBA1C 6.5 (H) 10/18/2018     Current meds: Metformin 500 mg BID  Foot exam completed- Yes  Eye exam completed- Yes  Microalbumin completed- No- order today  On statin therapy- yes  On ACEI/ARB- no  Due for A1C. Checking glucose at home with ranges . Foot exam today 02/20/2020    Coronary artery disease  -Hx of STEMI in 2015 and PCI with DIANA to LAD at that time  -Remains on ASA/statin.  On beta-blocker  -Denies symptoms of angina or dyspnea    Trochanteric bursitis of left hip  -Followed by ortho  -Receiving intra-articular injections, last on 2/19/20 with improvement of pain and mobility    Anxiety  -Hx of severe anxiety but no longer having symptoms  -Has xanax at home but never uses  -Not on any other medications for anxiety    IBS (irritable bowel syndrome)  -IBS with diarrhea  -Evaluated by GI and colorectal in the past  -Symptoms controlled with daily colesevelam    _____________________________________________________________________________________________________________________________________________________    Orders this visit:    Type 2 diabetes mellitus with hyperlipidemia  -     Microalbumin/creatinine urine ratio; Standing  -     Hemoglobin A1c; Standing    Migraine without aura and without status migrainosus, not intractable    Coronary artery disease, angina presence unspecified, unspecified vessel or lesion type, unspecified whether native or transplanted heart  -     Lipid panel; Standing  -     Comprehensive metabolic panel; Standing    Trochanteric bursitis of left hip    Anxiety    Irritable bowel syndrome with diarrhea  -     colesevelam (WELCHOL) 625 mg tablet;  Take 3 tablets (1,875 mg total) by mouth once daily.  Dispense: 150 tablet; Refill: 11    Need for pneumococcal vaccine  -     Pneumococcal Polysaccharide Vaccine (23 Valent) (SQ/IM)    Asymptomatic age-related postmenopausal state  -     DXA Bone Density Spine And Hip; Future; Expected date: 02/20/2020      Follow up in about 6 months (around 8/20/2020).    Padmini Boucher MD  _____________________________________________________________________________________________________________________________________________________    HPI:    Patient is in clinic today as an established patient, new to me, here to establish care.  Patient is a 79-year-old female with a past medical history of type 2 diabetes, irritable bowel syndrome, coronary artery disease, anxiety, and chronic migraines.    Patient currently on metformin daily for type 2 diabetes.  She is due for repeat A1c.  She has a glucose log with her today with glucose ranging from .  No episodes of hypoglycemia.  Tolerating metformin well.    Patient also followed by Cardiology for history coronary artery disease.  She has had no further episodes of angina since her STEMI in 2015.  She remains on a baby aspirin statin daily.  Also remains on a beta-blocker    History of anxiety in the past but states that the symptoms are no longer present.  Not currently on any chronic therapy for anxiety.  She does have manic home but states that she uses maybe a couple times yearly.    Patient currently being followed by ortho for bursitis.  Had a joint injection several days ago which has provided a lot of relief of her joint pain. She does not take any chronic medications for her joint pains.    Patient also with a history of irritable bowel syndrome.  She is currently doing well on daily Welchol, however there seems to be an issue with insurance paying for this.  We discussed trying to prescribe this medication again.  We can try alternate therapy if insurance will  not cover this medication any longer.  Patient reports she has tried getting off this medication but has return of diarrhea symptoms.    No other complaints today in clinic.  Routine health maintenance reviewed.  Annual labs Rubén.  Pneumovax today.  DEXA scan ordered.    Past Medical History:  Past Medical History:   Diagnosis Date    Allergy     Anxiety     Asthma     Coronary artery disease 10/18/2018    DM (diabetes mellitus)     IBS (irritable bowel syndrome)     Migraine headache     Mitral valve prolapse     Type 2 diabetes mellitus with hyperlipidemia 9/6/2016     Past Surgical History:   Procedure Laterality Date    BREAST BIOPSY      BREAST LUMPECTOMY      HYSTERECTOMY      partial    LA COLONOSCOPY,REMV LIZET,FORCEP/CAUTERY  8/23/2012          Review of patient's allergies indicates:   Allergen Reactions    Codeine      Other reaction(s): Unknown  unknown    Diclofenac Nausea And Vomiting    Doxycycline      Other reaction(s): Unknown  unknown    Iodinated contrast media Hives     Pt reports rxn to iv dye years ago,but no rxn to oral contrast    Sulfa (sulfonamide antibiotics)      Other reaction(s): Unknown  unknown     Social History     Tobacco Use    Smoking status: Never Smoker   Substance Use Topics    Alcohol use: No    Drug use: No     Family History   Problem Relation Age of Onset    Heart disease Mother     Diabetes Brother     Heart disease Brother     Early death Brother     Breast cancer Maternal Aunt      Current Outpatient Medications on File Prior to Visit   Medication Sig Dispense Refill    ALPRAZolam (XANAX) 0.25 MG tablet TAKE 1 TABLET BY MOUTH 4 TIMES DAILY AS NEEDED 90 tablet 5    amitriptyline (ELAVIL) 50 MG tablet Take 1 tablet (50 mg total) by mouth every evening. 90 tablet 4    aspirin (ECOTRIN) 81 MG EC tablet Take 81 mg by mouth once daily.      atorvastatin (LIPITOR) 80 MG tablet Take 80 mg by mouth.      blood sugar diagnostic (CONTOUR TEST STRIPS)  Strp Use to test blood glucose twice daily 200 each 12    clopidogrel (PLAVIX) 75 mg tablet Take 75 mg by mouth once daily.      furosemide (LASIX) 20 MG tablet Take 20 mg by mouth daily as needed.      lancets (MICROLET LANCET) Misc test blood glucose once daily 100 each 10    metFORMIN (GLUCOPHAGE) 500 MG tablet Take 1 tablet (500 mg total) by mouth 2 (two) times daily. 180 tablet 0    methen-m.blue-s.phos-phsal-hyo (URIBEL) 118-10-40.8-36 mg Cap Take 1 capsule by mouth 4 (four) times daily as needed (with liberal amounts fluid). 120 capsule 0    metoprolol succinate (TOPROL-XL) 100 MG 24 hr tablet Take 50 mg by mouth once daily.       nitrofurantoin (MACRODANTIN) 100 MG capsule Take 1 capsule (100 mg total) by mouth every 12 (twelve) hours. 20 capsule 1    ranitidine (ZANTAC) 300 MG tablet Take 1 tablet (300 mg total) by mouth every evening. 30 tablet 6    [DISCONTINUED] colesevelam (WELCHOL) 625 mg tablet Take 3 tablets (1,875 mg total) by mouth 2 (two) times daily with meals. (Patient taking differently: Take 1,875 mg by mouth once daily. ) 150 tablet 11    hyoscyamine (ANASPAZ,LEVSIN) 0.125 mg Tab Take 1 tablet (125 mcg total) by mouth every 6 (six) hours as needed. 40 tablet 11    [DISCONTINUED] cephALEXin (KEFLEX) 500 MG capsule Take 1 capsule (500 mg total) by mouth every 12 (twelve) hours. (Patient not taking: Reported on 2/20/2020) 14 capsule 0    [DISCONTINUED] fluticasone-salmeterol 100-50 mcg/dose (ADVAIR) 100-50 mcg/dose diskus inhaler Inhale 1 puff into the lungs 2 (two) times daily. Controller 60 each 11    [DISCONTINUED] levalbuterol (XOPENEX HFA) 45 mcg/actuation inhaler Inhale 1-2 puffs into the lungs every 4 (four) hours as needed for Wheezing. 15 g 6    [DISCONTINUED] montelukast (SINGULAIR) 10 mg tablet TAKE 1 TABLET BY MOUTH ONCE DAILY (Patient not taking: Reported on 2/20/2020) 30 tablet 11    [DISCONTINUED] nitrofurantoin, macrocrystal-monohydrate, (MACROBID) 100 MG capsule  "Take 1 capsule (100 mg total) by mouth 2 (two) times daily. (Patient not taking: Reported on 2/20/2020) 20 capsule 1     No current facility-administered medications on file prior to visit.        Review of Systems   Constitutional: Negative for chills, diaphoresis, fatigue and fever.   HENT: Negative for congestion, ear pain, postnasal drip, sinus pain and sore throat.    Eyes: Negative for pain and redness.   Respiratory: Negative for cough, chest tightness and shortness of breath.    Cardiovascular: Negative for chest pain and leg swelling.   Gastrointestinal: Negative for abdominal pain, constipation, diarrhea, nausea and vomiting.   Genitourinary: Negative for dysuria and hematuria.   Musculoskeletal: Negative for arthralgias and joint swelling.   Skin: Negative for rash.   Neurological: Negative for dizziness, syncope and headaches.       Vitals:    02/20/20 0949   BP: 121/61   Pulse: 71   Temp: 97.8 °F (36.6 °C)   Weight: 74.4 kg (164 lb)   Height: 5' 7" (1.702 m)       Wt Readings from Last 3 Encounters:   02/20/20 74.4 kg (164 lb)   10/30/19 76.2 kg (167 lb 15.8 oz)   02/28/19 76.2 kg (168 lb)       Physical Exam   Constitutional: She is oriented to person, place, and time. She appears well-developed and well-nourished. No distress.   HENT:   Head: Normocephalic and atraumatic.   Eyes: Conjunctivae and EOM are normal.   Neck: Normal range of motion. Neck supple.   Cardiovascular: Normal rate, regular rhythm, normal heart sounds and intact distal pulses.   No murmur heard.  Pulmonary/Chest: Effort normal and breath sounds normal. No respiratory distress.   Abdominal: Soft. Bowel sounds are normal. She exhibits no distension. There is no tenderness.   Musculoskeletal: Normal range of motion.   Neurological: She is alert and oriented to person, place, and time.   Skin: Skin is warm and dry. No rash noted.   Psychiatric: She has a normal mood and affect.     Protective Sensation (w/ 10 gram " monofilament):  Right: Intact  Left: Intact    Visual Inspection:  Normal -  Bilateral    Pedal Pulses:   Right: Diminshed  Left: Present    Posterior tibialis:   Right:Present  Left: Present

## 2020-02-20 NOTE — ASSESSMENT & PLAN NOTE
-Hx of severe anxiety but no longer having symptoms  -Has xanax at home but never uses  -Not on any other medications for anxiety

## 2020-02-20 NOTE — ASSESSMENT & PLAN NOTE
-Hx of STEMI in 2015 and PCI with DIANA to LAD at that time  -Remains on ASA/statin.  On beta-blocker  -Denies symptoms of angina or dyspnea

## 2020-02-20 NOTE — ASSESSMENT & PLAN NOTE
-Followed by ortho  -Receiving intra-articular injections, last on 2/19/20 with improvement of pain and mobility

## 2020-02-20 NOTE — ASSESSMENT & PLAN NOTE
-IBS with diarrhea  -Evaluated by GI and colorectal in the past  -Symptoms controlled with daily colesevelam

## 2020-02-20 NOTE — ASSESSMENT & PLAN NOTE
Last A1C   Lab Results   Component Value Date    HGBA1C 6.5 (H) 10/18/2018     Current meds: Metformin 500 mg BID  Foot exam completed- Yes  Eye exam completed- Yes  Microalbumin completed- No- order today  On statin therapy- yes  On ACEI/ARB- no  Due for A1C. Checking glucose at home with ranges . Foot exam today 02/20/2020

## 2020-02-20 NOTE — ASSESSMENT & PLAN NOTE
-Hx of chronic migraines  -Currently on elavil 50 mg nightly with prevention of headaches  -Tolerating medication without adverse effects

## 2020-02-21 NOTE — PROGRESS NOTES
Results have been released via [employer]. Please verify that these have been viewed by patient. If not, please call patient with results.    Please pend the following orders:  Labs:  Repeat A1c in 3 months    I have sent a message to them with the following interpretation (see below).    Your metabolic panel which shows your electrolytes, glucose, kidney and liver function is within normal limits.    Your cholesterol is within normal limits and stable.    Your hemoglobin A1c is 6.9.  This is slightly increased compared to your previous A1c of 6.5, however still remains within goal.  Plan to continue current diabetes medicines.

## 2020-04-30 ENCOUNTER — TELEPHONE (OUTPATIENT)
Dept: FAMILY MEDICINE | Facility: CLINIC | Age: 80
End: 2020-04-30

## 2020-04-30 DIAGNOSIS — K58.0 IRRITABLE BOWEL SYNDROME WITH DIARRHEA: Primary | ICD-10-CM

## 2020-04-30 RX ORDER — CHOLESTYRAMINE 4 G/9G
4 POWDER, FOR SUSPENSION ORAL DAILY
Qty: 90 PACKET | Refills: 3 | Status: SHIPPED | OUTPATIENT
Start: 2020-04-30 | End: 2020-10-21 | Stop reason: ALTCHOICE

## 2020-04-30 NOTE — TELEPHONE ENCOUNTER
We can try the medication below, Questran. It is similar to Welchol. I do not know if it will be a cheaper option but I will prescribe it and patient can let us know if that is a more affordable option.     I have signed for the following orders AND/OR meds.  Please call the patient and ask the patient to schedule the testing AND/OR inform about any medications that were sent. Medications have been sent to pharmacy listed below      No orders of the defined types were placed in this encounter.      Medications Ordered This Encounter   Medications    cholestyramine (QUESTRAN) 4 gram packet     Sig: Take 1 packet (4 g total) by mouth once daily.     Dispense:  90 packet     Refill:  3         [id number]  [place], [place] - [address] [address]  Phone: [phone] Fax: [phone]    [id number] - [place] - [address]   [address] [address]  Phone: [phone] Fax: [phone]

## 2020-04-30 NOTE — TELEPHONE ENCOUNTER
----- Message from Kaelyn Rhodes sent at 4/30/2020 12:55 PM CDT -----  Pt is requesting a call back regarding speaking with nurse guillen regarding questions about medication. Please call pt back at [phone]

## 2020-04-30 NOTE — TELEPHONE ENCOUNTER
----- Message from Kaelyn Rhodes sent at 4/30/2020 12:55 PM CDT -----  Pt is requesting a call back regarding speaking with nurse guillen regarding questions about medication. Please call pt back at [phone]

## 2020-05-28 DIAGNOSIS — I25.10 CORONARY ARTERY DISEASE, ANGINA PRESENCE UNSPECIFIED, UNSPECIFIED VESSEL OR LESION TYPE, UNSPECIFIED WHETHER NATIVE OR TRANSPLANTED HEART: Primary | ICD-10-CM

## 2020-05-28 RX ORDER — NITROFURANTOIN (MACROCRYSTALS) 100 MG/1
100 CAPSULE ORAL EVERY 12 HOURS
Qty: 20 CAPSULE | Refills: 0 | Status: SHIPPED | OUTPATIENT
Start: 2020-05-28 | End: 2020-10-21

## 2020-05-28 NOTE — TELEPHONE ENCOUNTER
----- Message from Charissa Irby sent at 5/28/2020  2:54 PM CDT -----  Contact: self  Type:  RX Refill Request    Who Called: Alon  Refill or New Rx:refill  RX Name and Strength:nitrofurantoin (MACRODANTIN) 100 MG capsule  How is the patient currently taking it? (ex. 1XDay):  Is this a 30 day or 90 day RX:  Preferred Pharmacy with phone number:    [id number] [place] [place] [place] - [address] [address]  Phone: [phone] Fax: [phone]    Local or Mail Order:local  Ordering Provider:Chu  Would the patient rather a call back or a response via [facility]? call  Best Call Back Number:[phone]  Additional Information:

## 2020-05-28 NOTE — TELEPHONE ENCOUNTER
Patient reports increased urinary frequency, and bladder fullness. She stated she does not want to leave home d/t virus.

## 2020-05-28 NOTE — TELEPHONE ENCOUNTER
Called patient to make her aware meds were sent and urine will need to be provided if symptoms continue. Verbalized understanding.

## 2020-06-24 RX ORDER — METFORMIN HYDROCHLORIDE 500 MG/1
TABLET ORAL
Qty: 180 TABLET | Refills: 0 | Status: SHIPPED | OUTPATIENT
Start: 2020-06-24 | End: 2020-09-28

## 2020-09-28 RX ORDER — METFORMIN HYDROCHLORIDE 500 MG/1
TABLET ORAL
Qty: 180 TABLET | Refills: 1 | Status: SHIPPED | OUTPATIENT
Start: 2020-09-28 | End: 2021-03-31

## 2020-09-29 ENCOUNTER — TELEPHONE (OUTPATIENT)
Dept: FAMILY MEDICINE | Facility: CLINIC | Age: 80
End: 2020-09-29

## 2020-09-29 ENCOUNTER — PATIENT MESSAGE (OUTPATIENT)
Dept: OTHER | Facility: OTHER | Age: 80
End: 2020-09-29

## 2020-09-29 DIAGNOSIS — Z12.39 SCREENING BREAST EXAMINATION: ICD-10-CM

## 2020-09-29 DIAGNOSIS — Z12.31 ENCOUNTER FOR SCREENING MAMMOGRAM FOR BREAST CANCER: Primary | ICD-10-CM

## 2020-09-29 NOTE — TELEPHONE ENCOUNTER
I have signed for the following orders AND/OR meds.  Please call the patient and ask the patient to schedule the testing AND/OR inform about any medications that were sent. Medications have been sent to pharmacy listed below      Orders Placed This Encounter   Procedures    Mammo Digital Screening Bilat w/ Mike     Standing Status:   Future     Standing Expiration Date:   11/29/2021     Order Specific Question:   May the Radiologist modify the order per protocol to meet the clinical needs of the patient?     Answer:   Yes              [id number] - [place] - [address] [address]  Phone: [phone] Fax: [phone]    [id number] - [place] - [address]  [address] [address] [address]  Phone: [phone] Fax: [phone]

## 2020-10-14 ENCOUNTER — LAB VISIT (OUTPATIENT)
Dept: LAB | Facility: HOSPITAL | Age: 80
End: 2020-10-14
Attending: INTERNAL MEDICINE
Payer: MEDICARE

## 2020-10-14 DIAGNOSIS — E11.69 TYPE 2 DIABETES MELLITUS WITH HYPERLIPIDEMIA: ICD-10-CM

## 2020-10-14 DIAGNOSIS — E78.5 TYPE 2 DIABETES MELLITUS WITH HYPERLIPIDEMIA: ICD-10-CM

## 2020-10-14 LAB
ESTIMATED AVG GLUCOSE: 148 MG/DL (ref 68–131)
HBA1C MFR BLD HPLC: 6.8 % (ref 4–5.6)

## 2020-10-14 PROCEDURE — 36415 COLL VENOUS BLD VENIPUNCTURE: CPT | Mod: PO

## 2020-10-14 PROCEDURE — 83036 HEMOGLOBIN GLYCOSYLATED A1C: CPT

## 2020-10-19 NOTE — PROGRESS NOTES
Results have been released via [employer]. Please verify that these have been viewed by patient. If not, please call patient with results.    I have sent a message to them with the following interpretation (see below).    I have reviewed your recent lab results.    Your A1C remains at goal at 6.8. It has improved slightly from last check at 6.9. Keep up the good work and continue your current medications.    Please do not hesitate to call or message with any additional questions or concerns.

## 2020-10-21 ENCOUNTER — OFFICE VISIT (OUTPATIENT)
Dept: FAMILY MEDICINE | Facility: CLINIC | Age: 80
End: 2020-10-21
Payer: MEDICARE

## 2020-10-21 VITALS
DIASTOLIC BLOOD PRESSURE: 62 MMHG | BODY MASS INDEX: 25.23 KG/M2 | HEIGHT: 66 IN | TEMPERATURE: 99 F | HEART RATE: 69 BPM | SYSTOLIC BLOOD PRESSURE: 137 MMHG | WEIGHT: 157 LBS

## 2020-10-21 DIAGNOSIS — E11.69 TYPE 2 DIABETES MELLITUS WITH HYPERLIPIDEMIA: Primary | ICD-10-CM

## 2020-10-21 DIAGNOSIS — Z79.899 ENCOUNTER FOR LONG-TERM (CURRENT) USE OF HIGH-RISK MEDICATION: ICD-10-CM

## 2020-10-21 DIAGNOSIS — E78.5 TYPE 2 DIABETES MELLITUS WITH HYPERLIPIDEMIA: Primary | ICD-10-CM

## 2020-10-21 DIAGNOSIS — G43.009 MIGRAINE WITHOUT AURA AND WITHOUT STATUS MIGRAINOSUS, NOT INTRACTABLE: ICD-10-CM

## 2020-10-21 DIAGNOSIS — K58.0 IRRITABLE BOWEL SYNDROME WITH DIARRHEA: ICD-10-CM

## 2020-10-21 DIAGNOSIS — I25.10 CORONARY ARTERY DISEASE INVOLVING NATIVE CORONARY ARTERY OF NATIVE HEART WITHOUT ANGINA PECTORIS: ICD-10-CM

## 2020-10-21 DIAGNOSIS — F41.9 ANXIETY: ICD-10-CM

## 2020-10-21 DIAGNOSIS — I10 ESSENTIAL HYPERTENSION: ICD-10-CM

## 2020-10-21 DIAGNOSIS — Z23 INFLUENZA VACCINE NEEDED: ICD-10-CM

## 2020-10-21 DIAGNOSIS — Z11.59 ENCOUNTER FOR HEPATITIS C SCREENING TEST FOR LOW RISK PATIENT: ICD-10-CM

## 2020-10-21 PROCEDURE — 90694 VACC AIIV4 NO PRSRV 0.5ML IM: CPT | Mod: PBBFAC,PO

## 2020-10-21 PROCEDURE — 99214 PR OFFICE/OUTPT VISIT, EST, LEVL IV, 30-39 MIN: ICD-10-PCS | Mod: S$PBB,,, | Performed by: INTERNAL MEDICINE

## 2020-10-21 PROCEDURE — 99214 OFFICE O/P EST MOD 30 MIN: CPT | Mod: S$PBB,,, | Performed by: INTERNAL MEDICINE

## 2020-10-21 PROCEDURE — 99999 PR PBB SHADOW E&M-EST. PATIENT-LVL IV: ICD-10-PCS | Mod: PBBFAC,,, | Performed by: INTERNAL MEDICINE

## 2020-10-21 PROCEDURE — G0008 ADMIN INFLUENZA VIRUS VAC: HCPCS | Mod: PBBFAC,PO

## 2020-10-21 PROCEDURE — 99999 PR PBB SHADOW E&M-EST. PATIENT-LVL IV: CPT | Mod: PBBFAC,,, | Performed by: INTERNAL MEDICINE

## 2020-10-21 PROCEDURE — 99214 OFFICE O/P EST MOD 30 MIN: CPT | Mod: PBBFAC,PO,25 | Performed by: INTERNAL MEDICINE

## 2020-10-21 RX ORDER — MONTELUKAST SODIUM 4 MG/1
1 TABLET, CHEWABLE ORAL 2 TIMES DAILY
Qty: 180 TABLET | Refills: 3 | Status: SHIPPED | OUTPATIENT
Start: 2020-10-21 | End: 2022-04-29

## 2020-10-21 NOTE — ASSESSMENT & PLAN NOTE
-at goal today  -currently on metoprolol xl 50 mg QD  -continue lifestyle modification with low sodium diet and exercise   -discussed hypertension disease course and importance of treating high blood pressure  -patient understood and advised of risk of untreated blood pressure.  ER precautions were given   for symptoms of hypertensive urgency and emergency.

## 2020-10-21 NOTE — PROGRESS NOTES
Assessment/Plan:    Type 2 diabetes mellitus with hyperlipidemia  -condition is currently controlled  -current meds: Metformin 500 mg BID  -plan to continue current medications. Repeat A1C in 3-6 months  -see diabetic health maintenance listed below  -counseling provided on importance of diabetic diet and medication compliance in order to treat diabetes  -discussed diabetes disease course and potential complications    IBS (irritable bowel syndrome)  -chronic hx of IBS with diarrhea  -evaluated by GI and colorectal in the past  -symptoms controlled with daily colesevelam but expensive  -switched to questran but could not tolerate powder due to nausea  -will try colestid 1 g BID instead    Coronary artery disease  -hx of STEMI in 2015 and PCI with DIANA to LAD at that time  -remains on ASA/statin. On beta-blocker  -followed by [facility], Dr. Henning  -denies symptoms of angina or dyspnea    Anxiety  -hx of severe anxiety but no longer having symptoms  -has xanax at home but never uses  -not on any other medications for anxiety    Migraine without aura and without status migrainosus, not intractable  -hx of chronic migraines  -currently on elavil 50 mg nightly with prevention of headaches  -tolerating medication without adverse effects    Essential hypertension  -at goal today  -currently on metoprolol xl 50 mg QD  -continue lifestyle modification with low sodium diet and exercise   -discussed hypertension disease course and importance of treating high blood pressure  -patient understood and advised of risk of untreated blood pressure.  ER precautions were given   for symptoms of hypertensive urgency and emergency.    _____________________________________________________________________________________________________________________________________________________    Orders this visit:    Type 2 diabetes mellitus with hyperlipidemia    Irritable bowel syndrome with diarrhea  -     colestipoL (COLESTID) 1 gram Tab;  Take 1 tablet (1 g total) by mouth 2 (two) times daily.  Dispense: 180 tablet; Refill: 3    Coronary artery disease involving native coronary artery of native heart without angina pectoris    Anxiety    Migraine without aura and without status migrainosus, not intractable    Essential hypertension      Follow up in about 6 months (around 4/21/2021).    Padmini Boucher MD  _____________________________________________________________________________________________________________________________________________________    HPI:    Patient is in clinic today as an established patient here for follow up of chronic medical conditions.    Migraines: controlled on elavil. No recent headaches.    CAD: stable. Followed by cardiology. Last seen in AUG. No recent CP, SOB or palpitation.    HTN: The patient is currently being treated for essential hypertension. This condition is chronic and stable. The patient is tolerating their medication well with good compliance.  Denies any adverse effects of medications.  Counseling was offered regarding low sodium diet.  The patient has a reduced salt intake. Routine exercise recommended. The patient denies headache, vision changes, chest pain, palpitations, shortness of breath, or lower extremity edema.    DM2: Patient presents for follow up of diabetes. Condition is chronic and stable. Patient denies symptoms, including foot ulcerations, hyperglycemia, hypoglycemia , nausea, paresthesia of the feet, polydipsia, polyuria and visual disturbances.  Evaluation to date has been included: fasting blood sugar, fasting lipid panel, hemoglobin A1C and microalbuminuria.  Home fasting sugars: . Treatment to date:  metformin. Denies adverse effects of medications.     Diabetes Management Status    Statin: Taking  ACE/ARB: Not taking    Screening or Prevention Patient's value Goal Complete/Controlled?   HgA1C Testing and Control   Lab Results   Component Value Date    HGBA1C 6.8 (H)  10/14/2020      Annually/Less than 8% Yes   Lipid profile : 02/20/2020 Annually Yes   LDL control Lab Results   Component Value Date    LDLCALC 55.0 (L) 02/20/2020    Annually/Less than 100 mg/dl  Yes   Nephropathy screening Lab Results   Component Value Date    LABMICR 25.0 02/20/2020     Lab Results   Component Value Date    PROTEINUA SEE COMMENT 10/30/2019    Annually Yes   Blood pressure BP Readings from Last 1 Encounters:   10/21/20 137/62    Less than 140/90 Yes   Dilated retinal exam : 03/12/2020 Annually Yes   Foot exam   : 02/20/2020 Annually Yes     No new complaints today. Routine health maintenance reviewed. Annual labs due in Feb 2021. MMG/DEXA scheduled. Flu shot today.    Past Medical History:  Past Medical History:   Diagnosis Date    Allergy     Anxiety     Asthma     Coronary artery disease 10/18/2018    DM (diabetes mellitus)     Essential hypertension 10/21/2020    IBS (irritable bowel syndrome)     Migraine headache     Mitral valve prolapse     Type 2 diabetes mellitus with hyperlipidemia 9/6/2016     Past Surgical History:   Procedure Laterality Date    BREAST BIOPSY      BREAST LUMPECTOMY      HYSTERECTOMY      partial    MS COLONOSCOPY,REMV LESN,FORCEP/CAUTERY  8/23/2012          Review of patient's allergies indicates:   Allergen Reactions    Codeine      Other reaction(s): Unknown  unknown    Diclofenac Nausea And Vomiting    Doxycycline      Other reaction(s): Unknown  unknown    Iodinated contrast media Hives     Pt reports rxn to iv dye years ago,but no rxn to oral contrast    Sulfa (sulfonamide antibiotics)      Other reaction(s): Unknown  unknown     Social History     Tobacco Use    Smoking status: Never Smoker   Substance Use Topics    Alcohol use: No    Drug use: No     Family History   Problem Relation Age of Onset    Heart disease Mother     Diabetes Brother     Heart disease Brother     Early death Brother     Breast cancer Maternal Aunt      Current  Outpatient Medications on File Prior to Visit   Medication Sig Dispense Refill    ALPRAZolam (XANAX) 0.25 MG tablet TAKE 1 TABLET BY MOUTH 4 TIMES DAILY AS NEEDED 90 tablet 5    amitriptyline (ELAVIL) 50 MG tablet Take 1 tablet (50 mg total) by mouth every evening. 90 tablet 4    aspirin (ECOTRIN) 81 MG EC tablet Take 81 mg by mouth once daily.      atorvastatin (LIPITOR) 80 MG tablet Take 80 mg by mouth.      blood sugar diagnostic (CONTOUR TEST STRIPS) Strp Use to test blood glucose twice daily 200 each 12    clopidogrel (PLAVIX) 75 mg tablet Take 75 mg by mouth once daily.      furosemide (LASIX) 20 MG tablet Take 20 mg by mouth daily as needed.      lancets (MICROLET LANCET) Misc test blood glucose once daily 100 each 10    metFORMIN (GLUCOPHAGE) 500 MG tablet TAKE 1 TABLET BY MOUTH TWICE DAILY  180 tablet 1    metoprolol succinate (TOPROL-XL) 100 MG 24 hr tablet Take 50 mg by mouth once daily.       [DISCONTINUED] cholestyramine (QUESTRAN) 4 gram packet Take 1 packet (4 g total) by mouth once daily. 90 packet 3    [DISCONTINUED] hyoscyamine (ANASPAZ,LEVSIN) 0.125 mg Tab Take 1 tablet (125 mcg total) by mouth every 6 (six) hours as needed. (Patient not taking: Reported on 10/21/2020) 40 tablet 11    [DISCONTINUED] methen-m.blue-s.phos-phsal-hyo (URIBEL) 118-10-40.8-36 mg Cap Take 1 capsule by mouth 4 (four) times daily as needed (with liberal amounts fluid). (Patient not taking: Reported on 10/21/2020) 120 capsule 0    [DISCONTINUED] nitrofurantoin (MACRODANTIN) 100 MG capsule Take 1 capsule (100 mg total) by mouth every 12 (twelve) hours. (Patient not taking: Reported on 10/21/2020) 20 capsule 0    [DISCONTINUED] ranitidine (ZANTAC) 300 MG tablet Take 1 tablet (300 mg total) by mouth every evening. 30 tablet 6     No current facility-administered medications on file prior to visit.        Review of Systems   Constitutional: Negative for chills, diaphoresis, fatigue and fever.   HENT: Negative for  "congestion, ear pain, postnasal drip, sinus pain and sore throat.    Eyes: Negative for pain and redness.   Respiratory: Negative for cough, chest tightness and shortness of breath.    Cardiovascular: Negative for chest pain and leg swelling.   Gastrointestinal: Negative for abdominal pain, constipation, diarrhea, nausea and vomiting.   Genitourinary: Negative for dysuria and hematuria.   Musculoskeletal: Negative for arthralgias and joint swelling.   Skin: Negative for rash.   Neurological: Negative for dizziness, syncope and headaches.       Vitals:    10/21/20 1306   BP: 137/62   Pulse: 69   Temp: 99 °F (37.2 °C)   Weight: 71.2 kg (157 lb)   Height: 5' 6" (1.676 m)       Wt Readings from Last 3 Encounters:   10/21/20 71.2 kg (157 lb)   02/20/20 74.4 kg (164 lb)   10/30/19 76.2 kg (167 lb 15.8 oz)       Physical Exam  Constitutional:       General: She is not in acute distress.     Appearance: Normal appearance. She is well-developed.   HENT:      Head: Normocephalic and atraumatic.   Eyes:      Conjunctiva/sclera: Conjunctivae normal.   Neck:      Musculoskeletal: Normal range of motion and neck supple.   Cardiovascular:      Rate and Rhythm: Normal rate and regular rhythm.      Pulses: Normal pulses.      Heart sounds: Normal heart sounds. No murmur.   Pulmonary:      Effort: Pulmonary effort is normal. No respiratory distress.      Breath sounds: Normal breath sounds.   Abdominal:      General: Bowel sounds are normal. There is no distension.      Palpations: Abdomen is soft.      Tenderness: There is no abdominal tenderness.   Musculoskeletal: Normal range of motion.   Skin:     General: Skin is warm and dry.      Findings: No rash.   Neurological:      General: No focal deficit present.      Mental Status: She is alert and oriented to person, place, and time.         Health Maintenance   Topic Date Due    Hepatitis C Screening  1940    TETANUS VACCINE  07/25/1959    DEXA SCAN  09/20/2019    Foot " Exam  02/20/2021    Lipid Panel  02/20/2021    Urine Microalbumin  02/20/2021    Eye Exam  03/12/2021    Hemoglobin A1c  04/14/2021    Aspirin/Antiplatelet Therapy  10/21/2021    Pneumococcal Vaccine (65+ Low/Medium Risk)  Completed

## 2020-10-21 NOTE — ASSESSMENT & PLAN NOTE
-condition is currently controlled  -current meds: Metformin 500 mg BID  -plan to continue current medications. Repeat A1C in 3-6 months  -see diabetic health maintenance listed below  -counseling provided on importance of diabetic diet and medication compliance in order to treat diabetes  -discussed diabetes disease course and potential complications

## 2020-10-21 NOTE — ASSESSMENT & PLAN NOTE
-hx of STEMI in 2015 and PCI with DIANA to LAD at that time  -remains on ASA/statin. On beta-blocker  -followed by [facility], Dr. Henning  -denies symptoms of angina or dyspnea

## 2020-10-21 NOTE — ASSESSMENT & PLAN NOTE
-chronic hx of IBS with diarrhea  -evaluated by GI and colorectal in the past  -symptoms controlled with daily colesevelam but expensive  -switched to questran but could not tolerate powder due to nausea  -will try colestid 1 g BID instead

## 2020-10-28 ENCOUNTER — TELEPHONE (OUTPATIENT)
Dept: FAMILY MEDICINE | Facility: CLINIC | Age: 80
End: 2020-10-28

## 2020-10-28 DIAGNOSIS — L30.4 INTERTRIGO: Primary | ICD-10-CM

## 2020-10-28 RX ORDER — NYSTATIN 100000 [USP'U]/G
POWDER TOPICAL 4 TIMES DAILY
Qty: 60 G | Refills: 0 | Status: SHIPPED | OUTPATIENT
Start: 2020-10-28

## 2020-10-28 NOTE — TELEPHONE ENCOUNTER
I have sent a prescription, however if the rash does not improve she will need to follow-up with me in clinic.    I have signed for the following orders AND/OR meds.  Please call the patient and ask the patient to schedule the testing AND/OR inform about any medications that were sent. Medications have been sent to pharmacy listed below      No orders of the defined types were placed in this encounter.      Medications Ordered This Encounter   Medications    nystatin (MYCOSTATIN) powder     Sig: Apply topically 4 (four) times daily.     Dispense:  60 g     Refill:  0         [id number] - [place] - [address] [address]  Phone: [phone] Fax: [phone]    [id number] - [place] - [address]   [address] [place] [address]  Phone: [phone] Fax: [phone]

## 2020-10-28 NOTE — TELEPHONE ENCOUNTER
Patient states she will call back to reschedule mammogram appointment until after rash is healed.    She is requesting medication sent to pharmacy for yeast rash under breast or recommendations for something to use OTC. Please advise.

## 2020-10-28 NOTE — TELEPHONE ENCOUNTER
----- Message from Jono Arredondo sent at 10/28/2020  8:48 AM CDT -----  Regarding: PT advice  The patient is calling in regards to having a open sore/rash on the bottom of her Rt breast and would;d like to discuss if it would be safe to continue to keep the mammogram appointment, please advise #[phone]

## 2020-10-29 ENCOUNTER — TELEPHONE (OUTPATIENT)
Dept: ADMINISTRATIVE | Facility: HOSPITAL | Age: 80
End: 2020-10-29

## 2020-10-30 ENCOUNTER — HOSPITAL ENCOUNTER (OUTPATIENT)
Dept: RADIOLOGY | Facility: HOSPITAL | Age: 80
Discharge: HOME | End: 2020-10-30
Attending: INTERNAL MEDICINE
Payer: MEDICARE

## 2020-10-30 ENCOUNTER — TELEPHONE (OUTPATIENT)
Dept: ADMINISTRATIVE | Facility: HOSPITAL | Age: 80
End: 2020-10-30

## 2020-10-30 DIAGNOSIS — Z78.0 ASYMPTOMATIC AGE-RELATED POSTMENOPAUSAL STATE: ICD-10-CM

## 2020-10-30 PROCEDURE — 77080 DXA BONE DENSITY AXIAL: CPT | Mod: TC,PO

## 2020-10-30 PROCEDURE — 77080 DXA BONE DENSITY AXIAL: CPT | Mod: 26,,, | Performed by: RADIOLOGY

## 2020-10-30 PROCEDURE — 77080 DEXA BONE DENSITY SPINE HIP: ICD-10-PCS | Mod: 26,,, | Performed by: RADIOLOGY

## 2020-11-02 ENCOUNTER — HOSPITAL ENCOUNTER (OUTPATIENT)
Dept: RADIOLOGY | Facility: HOSPITAL | Age: 80
Discharge: HOME | End: 2020-11-02
Attending: INTERNAL MEDICINE
Payer: MEDICARE

## 2020-11-02 VITALS — BODY MASS INDEX: 25.23 KG/M2 | WEIGHT: 157 LBS | HEIGHT: 66 IN

## 2020-11-02 DIAGNOSIS — Z12.31 ENCOUNTER FOR SCREENING MAMMOGRAM FOR BREAST CANCER: ICD-10-CM

## 2020-11-02 PROBLEM — M85.89 OSTEOPENIA OF MULTIPLE SITES: Status: ACTIVE | Noted: 2020-11-02

## 2020-11-02 PROCEDURE — 77063 BREAST TOMOSYNTHESIS BI: CPT | Mod: 26,,, | Performed by: RADIOLOGY

## 2020-11-02 PROCEDURE — 77067 SCR MAMMO BI INCL CAD: CPT | Mod: 26,,, | Performed by: RADIOLOGY

## 2020-11-02 PROCEDURE — 77067 SCR MAMMO BI INCL CAD: CPT | Mod: TC,PO

## 2020-11-02 PROCEDURE — 77067 MAMMO DIGITAL SCREENING BILAT WITH TOMO: ICD-10-PCS | Mod: 26,,, | Performed by: RADIOLOGY

## 2020-11-02 PROCEDURE — 77063 MAMMO DIGITAL SCREENING BILAT WITH TOMO: ICD-10-PCS | Mod: 26,,, | Performed by: RADIOLOGY

## 2020-11-03 ENCOUNTER — TELEPHONE (OUTPATIENT)
Dept: FAMILY MEDICINE | Facility: CLINIC | Age: 80
End: 2020-11-03

## 2020-11-03 NOTE — TELEPHONE ENCOUNTER
----- Message from Shawn Burton sent at 11/3/2020  2:28 PM CST -----  Contact: Pt  States she's calling rg rescheduling her mammo due to a finding in the right breast and can be reached at [phone]

## 2020-11-03 NOTE — PROGRESS NOTES
Patient's DEXA scan results have been sent via portal. Please verify that patient has viewed results. If not, please call patient with interpretation below:    -Your recent DEXA scan shows osteopenia.    -I recommend that you start using weight bearing exercises to help reduce the risk of a fracture.    -Also, please start taking over the counter calcium 1200 mg daily and Vitamin D3 1000 units daily.  -We will plan to recheck your DEXA scan in 2 years.      Also please see below health maintenance items that are due:    Hepatitis C Screening due on 1940  TETANUS VACCINE due on 07/25/1959  Shingles Vaccine(1 of 2) due on 07/25/1991

## 2020-11-06 ENCOUNTER — TELEPHONE (OUTPATIENT)
Dept: RADIOLOGY | Facility: HOSPITAL | Age: 80
End: 2020-11-06

## 2020-11-06 ENCOUNTER — TELEPHONE (OUTPATIENT)
Dept: ADMINISTRATIVE | Facility: HOSPITAL | Age: 80
End: 2020-11-06

## 2020-11-09 ENCOUNTER — HOSPITAL ENCOUNTER (OUTPATIENT)
Dept: RADIOLOGY | Facility: HOSPITAL | Age: 80
Discharge: HOME | End: 2020-11-09
Attending: INTERNAL MEDICINE
Payer: MEDICARE

## 2020-11-09 DIAGNOSIS — R92.8 ABNORMAL MAMMOGRAM: ICD-10-CM

## 2020-11-09 PROCEDURE — 77065 DX MAMMO INCL CAD UNI: CPT | Mod: 26,RT,, | Performed by: RADIOLOGY

## 2020-11-09 PROCEDURE — 77061 BREAST TOMOSYNTHESIS UNI: CPT | Mod: 26,RT,, | Performed by: RADIOLOGY

## 2020-11-09 PROCEDURE — 77061 MAMMO DIGITAL DIAGNOSTIC RIGHT WITH TOMO: ICD-10-PCS | Mod: 26,RT,, | Performed by: RADIOLOGY

## 2020-11-09 PROCEDURE — 77065 MAMMO DIGITAL DIAGNOSTIC RIGHT WITH TOMO: ICD-10-PCS | Mod: 26,RT,, | Performed by: RADIOLOGY

## 2020-11-09 PROCEDURE — 77061 BREAST TOMOSYNTHESIS UNI: CPT | Mod: TC,PO,RT

## 2020-11-09 PROCEDURE — 77065 DX MAMMO INCL CAD UNI: CPT | Mod: TC,PO,RT

## 2020-11-10 NOTE — PROGRESS NOTES
Normal mammogram, repeat in 1 year, results released through [employer]. Please verify that patient has viewed results. If not, please call patient with interpretation below:    I have reviewed the results of your recent diagnostic mammogram and ultrasound and it appears that everything was read as normal.  Based on this, the radiologist has recommended that you recheck a normal screening mammogram in 1 year.     Also please see below health maintenance items that are due:    Hepatitis C Screening due on 1940  TETANUS VACCINE due on 07/25/1959  Shingles Vaccine(1 of 2) due on 07/25/1991

## 2020-12-08 ENCOUNTER — TELEPHONE (OUTPATIENT)
Dept: FAMILY MEDICINE | Facility: CLINIC | Age: 80
End: 2020-12-08

## 2020-12-08 ENCOUNTER — LAB VISIT (OUTPATIENT)
Dept: LAB | Facility: HOSPITAL | Age: 80
End: 2020-12-08
Attending: INTERNAL MEDICINE
Payer: MEDICARE

## 2020-12-08 DIAGNOSIS — R30.0 DYSURIA: ICD-10-CM

## 2020-12-08 DIAGNOSIS — N30.00 ACUTE CYSTITIS WITHOUT HEMATURIA: Primary | ICD-10-CM

## 2020-12-08 DIAGNOSIS — R30.0 DYSURIA: Primary | ICD-10-CM

## 2020-12-08 LAB
BACTERIA #/AREA URNS HPF: ABNORMAL /HPF
BILIRUB UR QL STRIP: ABNORMAL
CLARITY UR: ABNORMAL
COLOR UR: ABNORMAL
GLUCOSE UR QL STRIP: ABNORMAL
HGB UR QL STRIP: ABNORMAL
HYALINE CASTS #/AREA URNS LPF: 2 /LPF
KETONES UR QL STRIP: ABNORMAL
LEUKOCYTE ESTERASE UR QL STRIP: ABNORMAL
MICROSCOPIC COMMENT: ABNORMAL
NITRITE UR QL STRIP: ABNORMAL
NON-SQ EPI CELLS #/AREA URNS HPF: 1 /HPF
PH UR STRIP: ABNORMAL [PH] (ref 5–8)
PROT UR QL STRIP: ABNORMAL
RBC #/AREA URNS HPF: 3 /HPF (ref 0–4)
SP GR UR STRIP: ABNORMAL (ref 1–1.03)
SQUAMOUS #/AREA URNS HPF: 15 /HPF
URN SPEC COLLECT METH UR: ABNORMAL
WBC #/AREA URNS HPF: 90 /HPF (ref 0–5)
YEAST URNS QL MICRO: ABNORMAL

## 2020-12-08 PROCEDURE — 87086 URINE CULTURE/COLONY COUNT: CPT

## 2020-12-08 PROCEDURE — 81000 URINALYSIS NONAUTO W/SCOPE: CPT | Mod: PO

## 2020-12-08 RX ORDER — NITROFURANTOIN 25; 75 MG/1; MG/1
100 CAPSULE ORAL 2 TIMES DAILY
Qty: 10 CAPSULE | Refills: 0 | Status: SHIPPED | OUTPATIENT
Start: 2020-12-08 | End: 2020-12-13

## 2020-12-08 NOTE — TELEPHONE ENCOUNTER
----- Message from Alva Garcia sent at 12/8/2020  7:43 AM CST -----  .Type:  Needs Medical Advice    Who Called: Alon Chatman  Symptoms (please be specific): bladder infection   How long has patient had these symptoms:  1 day  Pharmacy name and phone #:  ..  [id number] - [place] - [address] [facility]  [address] [facility] [address]  Phone: [phone] Fax: [phone]        Would the patient rather a call back or a response via [facility]? Call back  Best Call Back Number: [phone]  Additional Information:

## 2020-12-08 NOTE — TELEPHONE ENCOUNTER
Urine concerning for infection.  Antibiotic has been sent to pharmacy.  I will plan to follow up the culture.  Patient should follow-up in clinic if symptoms continue.    I have signed for the following orders AND/OR meds.  Please call the patient and ask the patient to schedule the testing AND/OR inform about any medications that were sent. Medications have been sent to pharmacy listed below      No orders of the defined types were placed in this encounter.      Medications Ordered This Encounter   Medications    nitrofurantoin, macrocrystal-monohydrate, (MACROBID) 100 MG capsule     Sig: Take 1 capsule (100 mg total) by mouth 2 (two) times daily. for 5 days     Dispense:  10 capsule     Refill:  0         [id number]  [place] - [address] [facility]  [address] [place] [address]  Phone: [phone] Fax: [phone]    [id number] - [place] - [address]   [address] [address]  Phone: [phone] Fax: [phone]

## 2020-12-09 LAB — BACTERIA UR CULT: NO GROWTH

## 2020-12-10 NOTE — PROGRESS NOTES
Results have been released via [facility]. Please verify that these have been viewed by patient. If not, please call patient with results.    Please schedule the following orders:  none    I have sent a message to them with the following interpretation (see below).    I have reviewed your recent urine results.    The urine culture did not grow any bacteria. Finish the prescribed antibiotics but follow up with me if your symptoms persists.

## 2020-12-11 ENCOUNTER — PATIENT MESSAGE (OUTPATIENT)
Dept: OTHER | Facility: OTHER | Age: 80
End: 2020-12-11

## 2020-12-21 ENCOUNTER — TELEPHONE (OUTPATIENT)
Dept: FAMILY MEDICINE | Facility: CLINIC | Age: 80
End: 2020-12-21

## 2020-12-21 DIAGNOSIS — N30.00 ACUTE CYSTITIS WITHOUT HEMATURIA: Primary | ICD-10-CM

## 2020-12-21 NOTE — TELEPHONE ENCOUNTER
pt needing something different called in for her because the first med is not working for the pt the pt is wanting the med nitrofurantoin please advise.

## 2020-12-21 NOTE — TELEPHONE ENCOUNTER
----- Message from Pratibha Lopez sent at 12/21/2020 12:59 PM CST -----  Contact: BRIAN ZHANG [[id number]]  .Type:  Needs Medical Advice    Who Called: BRIAN ZHANG [[id number]]  Symptoms (please be specific): bladder infection   How long has patient had these symptoms:      Pharmacy name and phone #:        [id number] - [place] - [address] [facility]  [address] [facility] [address]  Phone: [phone] Fax: [phone]     Would the patient rather a call back or a response via [facility]?   Call    Best Call Back Number:   [phone] (home)    Additional Information:  pt is requesting a call back from the nurse in regards to the pt needing something different called in for her because the first med is not working for the pt the pt is wanting the med nitrofurantoin please

## 2020-12-21 NOTE — TELEPHONE ENCOUNTER
Please ask if patient can bring in another urine sample. Her previous did not grow any bacteria on the culture. I would like to see if her urine has improved or not since finishing the last antibiotic.    I have signed for the following orders AND/OR meds.  Please call the patient and ask the patient to schedule the testing AND/OR inform about any medications that were sent. Medications have been sent to pharmacy listed below      Orders Placed This Encounter   Procedures    Urinalysis, Reflex to Urine Culture Urine, Clean Catch     Standing Status:   Future     Standing Expiration Date:   2/19/2022     Order Specific Question:   Preferred Collection Type     Answer:   Urine, Clean Catch     Order Specific Question:   Specimen Source     Answer:   Urine              [id number]  [place]  [address] [facility]  [address] [facility] [address]  Phone: [phone] Fax: [phone]    [id number] - [place] - [address]   [address] [address]  Phone: [phone] Fax: [phone]

## 2021-01-12 ENCOUNTER — IMMUNIZATION (OUTPATIENT)
Dept: INTERNAL MEDICINE | Facility: CLINIC | Age: 81
End: 2021-01-12
Payer: MEDICARE

## 2021-01-12 DIAGNOSIS — Z23 NEED FOR VACCINATION: ICD-10-CM

## 2021-01-12 PROCEDURE — 91300 COVID-19, MRNA, LNP-S, PF, 30 MCG/0.3 ML DOSE VACCINE: CPT | Mod: PBBFAC | Performed by: FAMILY MEDICINE

## 2021-02-02 ENCOUNTER — IMMUNIZATION (OUTPATIENT)
Dept: INTERNAL MEDICINE | Facility: CLINIC | Age: 81
End: 2021-02-02
Payer: MEDICARE

## 2021-02-02 DIAGNOSIS — Z23 NEED FOR VACCINATION: Primary | ICD-10-CM

## 2021-02-02 PROCEDURE — 0002A COVID-19, MRNA, LNP-S, PF, 30 MCG/0.3 ML DOSE VACCINE: CPT | Mod: PBBFAC | Performed by: FAMILY MEDICINE

## 2021-02-02 PROCEDURE — 91300 COVID-19, MRNA, LNP-S, PF, 30 MCG/0.3 ML DOSE VACCINE: CPT | Mod: PBBFAC | Performed by: FAMILY MEDICINE

## 2021-04-30 LAB
LEFT EYE DM RETINOPATHY: NEGATIVE
RIGHT EYE DM RETINOPATHY: NEGATIVE

## 2021-05-11 ENCOUNTER — PATIENT OUTREACH (OUTPATIENT)
Dept: ADMINISTRATIVE | Facility: HOSPITAL | Age: 81
End: 2021-05-11

## 2021-07-05 RX ORDER — METFORMIN HYDROCHLORIDE 500 MG/1
TABLET ORAL
Qty: 180 TABLET | Refills: 0 | Status: SHIPPED | OUTPATIENT
Start: 2021-07-05 | End: 2021-10-04

## 2021-08-03 ENCOUNTER — LAB VISIT (OUTPATIENT)
Dept: LAB | Facility: HOSPITAL | Age: 81
End: 2021-08-03
Attending: INTERNAL MEDICINE
Payer: MEDICARE

## 2021-08-03 DIAGNOSIS — E11.69 TYPE 2 DIABETES MELLITUS WITH HYPERLIPIDEMIA: ICD-10-CM

## 2021-08-03 DIAGNOSIS — I25.10 CORONARY ARTERY DISEASE, ANGINA PRESENCE UNSPECIFIED, UNSPECIFIED VESSEL OR LESION TYPE, UNSPECIFIED WHETHER NATIVE OR TRANSPLANTED HEART: ICD-10-CM

## 2021-08-03 DIAGNOSIS — E78.5 TYPE 2 DIABETES MELLITUS WITH HYPERLIPIDEMIA: ICD-10-CM

## 2021-08-03 LAB
ALBUMIN SERPL BCP-MCNC: 3.7 G/DL (ref 3.5–5.2)
ALP SERPL-CCNC: 57 U/L (ref 55–135)
ALT SERPL W/O P-5'-P-CCNC: 15 U/L (ref 10–44)
ANION GAP SERPL CALC-SCNC: 8 MMOL/L (ref 8–16)
AST SERPL-CCNC: 20 U/L (ref 10–40)
BILIRUB SERPL-MCNC: 0.7 MG/DL (ref 0.1–1)
BUN SERPL-MCNC: 17 MG/DL (ref 8–23)
CALCIUM SERPL-MCNC: 9.4 MG/DL (ref 8.7–10.5)
CHLORIDE SERPL-SCNC: 102 MMOL/L (ref 95–110)
CO2 SERPL-SCNC: 28 MMOL/L (ref 23–29)
CREAT SERPL-MCNC: 0.8 MG/DL (ref 0.5–1.4)
EST. GFR  (AFRICAN AMERICAN): >60 ML/MIN/1.73 M^2
EST. GFR  (NON AFRICAN AMERICAN): >60 ML/MIN/1.73 M^2
ESTIMATED AVG GLUCOSE: 154 MG/DL (ref 68–131)
GLUCOSE SERPL-MCNC: 141 MG/DL (ref 70–110)
HBA1C MFR BLD: 7 % (ref 4–5.6)
POTASSIUM SERPL-SCNC: 4.1 MMOL/L (ref 3.5–5.1)
PROT SERPL-MCNC: 6.6 G/DL (ref 6–8.4)
SODIUM SERPL-SCNC: 138 MMOL/L (ref 136–145)

## 2021-08-03 PROCEDURE — 83036 HEMOGLOBIN GLYCOSYLATED A1C: CPT | Performed by: INTERNAL MEDICINE

## 2021-08-03 PROCEDURE — 80053 COMPREHEN METABOLIC PANEL: CPT | Performed by: INTERNAL MEDICINE

## 2021-08-03 PROCEDURE — 36415 COLL VENOUS BLD VENIPUNCTURE: CPT | Mod: PO | Performed by: INTERNAL MEDICINE

## 2021-08-05 DIAGNOSIS — E11.69 TYPE 2 DIABETES MELLITUS WITH HYPERLIPIDEMIA: ICD-10-CM

## 2021-08-05 DIAGNOSIS — E78.5 TYPE 2 DIABETES MELLITUS WITH HYPERLIPIDEMIA: ICD-10-CM

## 2021-08-05 RX ORDER — LANCETS
EACH MISCELLANEOUS
Qty: 100 EACH | Refills: 11 | Status: SHIPPED | OUTPATIENT
Start: 2021-08-05 | End: 2022-06-09

## 2021-11-02 ENCOUNTER — IMMUNIZATION (OUTPATIENT)
Dept: FAMILY MEDICINE | Facility: CLINIC | Age: 81
End: 2021-11-02
Payer: MEDICARE

## 2021-11-02 DIAGNOSIS — Z23 NEED FOR VACCINATION: Primary | ICD-10-CM

## 2021-11-02 PROCEDURE — 91300 COVID-19, MRNA, LNP-S, PF, 30 MCG/0.3 ML DOSE VACCINE: CPT | Mod: PBBFAC,PO | Performed by: NURSE PRACTITIONER

## 2021-11-23 ENCOUNTER — PATIENT MESSAGE (OUTPATIENT)
Dept: ADMINISTRATIVE | Facility: HOSPITAL | Age: 81
End: 2021-11-23
Payer: MEDICARE

## 2021-11-23 ENCOUNTER — PATIENT OUTREACH (OUTPATIENT)
Dept: ADMINISTRATIVE | Facility: HOSPITAL | Age: 81
End: 2021-11-23
Payer: MEDICARE

## 2021-12-13 ENCOUNTER — TELEPHONE (OUTPATIENT)
Dept: FAMILY MEDICINE | Facility: CLINIC | Age: 81
End: 2021-12-13
Payer: MEDICARE

## 2021-12-13 DIAGNOSIS — Z12.31 ENCOUNTER FOR SCREENING MAMMOGRAM FOR BREAST CANCER: Primary | ICD-10-CM

## 2021-12-16 RX ORDER — AMITRIPTYLINE HYDROCHLORIDE 50 MG/1
TABLET, FILM COATED ORAL
Qty: 90 TABLET | Refills: 0 | Status: SHIPPED | OUTPATIENT
Start: 2021-12-16 | End: 2022-04-09

## 2021-12-28 ENCOUNTER — HOSPITAL ENCOUNTER (OUTPATIENT)
Dept: RADIOLOGY | Facility: HOSPITAL | Age: 81
Discharge: HOME | End: 2021-12-28
Attending: INTERNAL MEDICINE
Payer: MEDICARE

## 2021-12-28 ENCOUNTER — IMMUNIZATION (OUTPATIENT)
Dept: FAMILY MEDICINE | Facility: CLINIC | Age: 81
End: 2021-12-28
Payer: MEDICARE

## 2021-12-28 VITALS — WEIGHT: 156.94 LBS | BODY MASS INDEX: 25.22 KG/M2 | HEIGHT: 66 IN

## 2021-12-28 DIAGNOSIS — Z12.31 ENCOUNTER FOR SCREENING MAMMOGRAM FOR BREAST CANCER: ICD-10-CM

## 2021-12-28 PROCEDURE — 77063 BREAST TOMOSYNTHESIS BI: CPT | Mod: 26,,, | Performed by: RADIOLOGY

## 2021-12-28 PROCEDURE — 90694 VACC AIIV4 NO PRSRV 0.5ML IM: CPT | Mod: PBBFAC,PO

## 2021-12-28 PROCEDURE — G0008 ADMIN INFLUENZA VIRUS VAC: HCPCS | Mod: PBBFAC,PO

## 2021-12-28 PROCEDURE — 77067 SCR MAMMO BI INCL CAD: CPT | Mod: TC,PO

## 2021-12-28 PROCEDURE — 77067 SCR MAMMO BI INCL CAD: CPT | Mod: 26,,, | Performed by: RADIOLOGY

## 2021-12-28 PROCEDURE — 77063 MAMMO DIGITAL SCREENING BILAT WITH TOMO: ICD-10-PCS | Mod: 26,,, | Performed by: RADIOLOGY

## 2021-12-28 PROCEDURE — 77067 MAMMO DIGITAL SCREENING BILAT WITH TOMO: ICD-10-PCS | Mod: 26,,, | Performed by: RADIOLOGY

## 2021-12-29 ENCOUNTER — TELEPHONE (OUTPATIENT)
Dept: FAMILY MEDICINE | Facility: CLINIC | Age: 81
End: 2021-12-29
Payer: MEDICARE

## 2021-12-29 ENCOUNTER — PATIENT OUTREACH (OUTPATIENT)
Dept: ADMINISTRATIVE | Facility: HOSPITAL | Age: 81
End: 2021-12-29
Payer: MEDICARE

## 2022-04-08 NOTE — TELEPHONE ENCOUNTER
Care Due:                  Date            Visit Type   Department     Provider  --------------------------------------------------------------------------------                                EP -                              PRIMARY      [facility] FAMILY  Last Visit: 10-      CARE ([facility])   MEDICINE       Padmini Boucher  Next Visit: None Scheduled  None         None Found                                                            Last  Test          Frequency    Reason                     Performed    Due Date  --------------------------------------------------------------------------------    Office Visit  12 months..  amitriptyline, metFORMIN.  10-   10-    HBA1C.......  6 months...  metFORMIN................  08- 01-    Powered by [employer]. Reference number: [id number].   4/07/2022 7:33:15 PM CDT

## 2022-04-08 NOTE — TELEPHONE ENCOUNTER
Refill Routing Note   Medication(s) are not appropriate for processing by [facility] for the following reason(s):      - Patient has not been seen in over 15 months by PCP    [facility] action(s):  Defer Medication-related problems identified:   Requires labs  Requires appointment        Medication reconciliation completed: No     Appointments  past 12m or future 3m with PCP    Date Provider   Last Visit   10/21/2020 Padmini Boucher MD   Next Visit   Visit date not found Padmini Boucher MD   ED visits in past 90 days: 0        Note composed:5:28 PM 04/08/2022

## 2022-04-09 RX ORDER — AMITRIPTYLINE HYDROCHLORIDE 50 MG/1
TABLET, FILM COATED ORAL
Qty: 90 TABLET | Refills: 0 | Status: SHIPPED | OUTPATIENT
Start: 2022-04-09 | End: 2022-07-11

## 2022-04-29 ENCOUNTER — LAB VISIT (OUTPATIENT)
Dept: LAB | Facility: HOSPITAL | Age: 82
End: 2022-04-29
Attending: INTERNAL MEDICINE
Payer: MEDICARE

## 2022-04-29 ENCOUNTER — OFFICE VISIT (OUTPATIENT)
Dept: FAMILY MEDICINE | Facility: CLINIC | Age: 82
End: 2022-04-29
Payer: MEDICARE

## 2022-04-29 VITALS
HEIGHT: 66 IN | BODY MASS INDEX: 23.95 KG/M2 | SYSTOLIC BLOOD PRESSURE: 110 MMHG | WEIGHT: 149 LBS | TEMPERATURE: 98 F | DIASTOLIC BLOOD PRESSURE: 65 MMHG | HEART RATE: 62 BPM

## 2022-04-29 DIAGNOSIS — I25.10 CORONARY ARTERY DISEASE: ICD-10-CM

## 2022-04-29 DIAGNOSIS — E11.69 TYPE 2 DIABETES MELLITUS WITH HYPERLIPIDEMIA: Primary | ICD-10-CM

## 2022-04-29 DIAGNOSIS — G43.009 MIGRAINE WITHOUT AURA AND WITHOUT STATUS MIGRAINOSUS, NOT INTRACTABLE: ICD-10-CM

## 2022-04-29 DIAGNOSIS — I10 ESSENTIAL HYPERTENSION: ICD-10-CM

## 2022-04-29 DIAGNOSIS — E78.5 TYPE 2 DIABETES MELLITUS WITH HYPERLIPIDEMIA: ICD-10-CM

## 2022-04-29 DIAGNOSIS — E11.69 TYPE 2 DIABETES MELLITUS WITH HYPERLIPIDEMIA: ICD-10-CM

## 2022-04-29 DIAGNOSIS — E78.5 TYPE 2 DIABETES MELLITUS WITH HYPERLIPIDEMIA: Primary | ICD-10-CM

## 2022-04-29 DIAGNOSIS — I25.10 CORONARY ARTERY DISEASE INVOLVING NATIVE CORONARY ARTERY OF NATIVE HEART WITHOUT ANGINA PECTORIS: ICD-10-CM

## 2022-04-29 LAB
ALBUMIN SERPL BCP-MCNC: 3.7 G/DL (ref 3.5–5.2)
ALP SERPL-CCNC: 61 U/L (ref 55–135)
ALT SERPL W/O P-5'-P-CCNC: 30 U/L (ref 10–44)
ANION GAP SERPL CALC-SCNC: 12 MMOL/L (ref 8–16)
AST SERPL-CCNC: 23 U/L (ref 10–40)
BILIRUB SERPL-MCNC: 0.7 MG/DL (ref 0.1–1)
BUN SERPL-MCNC: 12 MG/DL (ref 8–23)
CALCIUM SERPL-MCNC: 9.8 MG/DL (ref 8.7–10.5)
CHLORIDE SERPL-SCNC: 104 MMOL/L (ref 95–110)
CHOLEST SERPL-MCNC: 159 MG/DL (ref 120–199)
CHOLEST/HDLC SERPL: 2.2 {RATIO} (ref 2–5)
CO2 SERPL-SCNC: 28 MMOL/L (ref 23–29)
CREAT SERPL-MCNC: 1 MG/DL (ref 0.5–1.4)
EST. GFR  (AFRICAN AMERICAN): >60 ML/MIN/1.73 M^2
EST. GFR  (NON AFRICAN AMERICAN): 53.3 ML/MIN/1.73 M^2
ESTIMATED AVG GLUCOSE: 154 MG/DL (ref 68–131)
GLUCOSE SERPL-MCNC: 135 MG/DL (ref 70–110)
HBA1C MFR BLD: 7 % (ref 4–5.6)
HDLC SERPL-MCNC: 72 MG/DL (ref 40–75)
HDLC SERPL: 45.3 % (ref 20–50)
LDLC SERPL CALC-MCNC: 67.4 MG/DL (ref 63–159)
NONHDLC SERPL-MCNC: 87 MG/DL
POTASSIUM SERPL-SCNC: 4.9 MMOL/L (ref 3.5–5.1)
PROT SERPL-MCNC: 6.7 G/DL (ref 6–8.4)
SODIUM SERPL-SCNC: 144 MMOL/L (ref 136–145)
TRIGL SERPL-MCNC: 98 MG/DL (ref 30–150)

## 2022-04-29 PROCEDURE — 80053 COMPREHEN METABOLIC PANEL: CPT | Performed by: INTERNAL MEDICINE

## 2022-04-29 PROCEDURE — 80061 LIPID PANEL: CPT | Performed by: INTERNAL MEDICINE

## 2022-04-29 PROCEDURE — 99999 PR PBB SHADOW E&M-EST. PATIENT-LVL III: CPT | Mod: PBBFAC,,, | Performed by: INTERNAL MEDICINE

## 2022-04-29 PROCEDURE — 99999 PR PBB SHADOW E&M-EST. PATIENT-LVL III: ICD-10-PCS | Mod: PBBFAC,,, | Performed by: INTERNAL MEDICINE

## 2022-04-29 PROCEDURE — 36415 COLL VENOUS BLD VENIPUNCTURE: CPT | Mod: PO | Performed by: INTERNAL MEDICINE

## 2022-04-29 PROCEDURE — 99214 PR OFFICE/OUTPT VISIT, EST, LEVL IV, 30-39 MIN: ICD-10-PCS | Mod: S$PBB,,, | Performed by: INTERNAL MEDICINE

## 2022-04-29 PROCEDURE — 99214 OFFICE O/P EST MOD 30 MIN: CPT | Mod: S$PBB,,, | Performed by: INTERNAL MEDICINE

## 2022-04-29 PROCEDURE — 99213 OFFICE O/P EST LOW 20 MIN: CPT | Mod: PBBFAC,PO | Performed by: INTERNAL MEDICINE

## 2022-04-29 PROCEDURE — 83036 HEMOGLOBIN GLYCOSYLATED A1C: CPT | Performed by: INTERNAL MEDICINE

## 2022-04-29 NOTE — PROGRESS NOTES
Assessment/Plan:    Problem List Items Addressed This Visit        Neuro    Migraine without aura and without status migrainosus, not intractable    Overview     -hx of chronic migraines  -currently on elavil 50 mg nightly with prevention of headaches  -tolerating medication without adverse effects              Cardiac/Vascular    Coronary artery disease    Overview     -hx of STEMI in 2015 and PCI with DIANA to LAD at that time  -remains on ASA/statin. On beta-blocker  -followed by [facility], Dr. Henning  -denies symptoms of angina or dyspnea           Essential hypertension    Overview     -at goal today  -currently on metoprolol xl 50 mg QD  -continue lifestyle modification with low sodium diet and exercise   -discussed hypertension disease course and importance of treating high blood pressure  -patient understood and advised of risk of untreated blood pressure.  ER precautions were given   for symptoms of hypertensive urgency and emergency.              Endocrine    Type 2 diabetes mellitus with hyperlipidemia - Primary    Overview     -condition is currently controlled  -current meds: Metformin 500 mg BID  -plan to continue current medications  -due for repeat a1c  -see diabetic health maintenance listed below  -counseling provided on importance of diabetic diet and medication compliance in order to treat diabetes  -discussed diabetes disease course and potential complications                   Follow up in about 6 months (around 10/29/2022) for F/U with me; labs today: cmp,lipid,a1c,microalb.    Padmini Boucher MD  _____________________________________________________________________________________________________________________________________________________    CC: follow up of chronic medical conditions     HPI:    Patient is in clinic today as an established patient here for follow up of chronic medical conditions.    HTN: The patient is currently being treated for essential hypertension. This  condition is chronic and stable. The patient is tolerating their medication well with good compliance.  Denies any adverse effects of medications.  Counseling was offered regarding low sodium diet.  The patient has a reduced salt intake. Routine exercise recommended. The patient denies headache, vision changes, chest pain, palpitations, shortness of breath, or lower extremity edema.    DM2: Patient presents for follow up of diabetes. Condition is chronic and stable. Patient denies symptoms, including foot ulcerations, hyperglycemia, hypoglycemia , nausea, paresthesia of the feet, polydipsia, polyuria and visual disturbances.  Evaluation to date has been included: fasting blood sugar, fasting lipid panel, hemoglobin A1C and microalbuminuria.  Denies adverse effects of current medications.     Diabetes Management Status    Statin: Taking  ACE/ARB: Not taking    Screening or Prevention Patient's value Goal Complete/Controlled?   HgA1C Testing and Control   Lab Results   Component Value Date    HGBA1C 7.0 (H) 08/03/2021      Annually/Less than 8% Yes   Lipid profile : 04/28/2021 Annually No   LDL control Lab Results   Component Value Date    LDLCALC 50 04/28/2021    Annually/Less than 100 mg/dl  No   Nephropathy screening Lab Results   Component Value Date    LABMICR 25.0 02/20/2020     Lab Results   Component Value Date    PROTEINUA SEE COMMENT 12/08/2020    Annually No   Blood pressure BP Readings from Last 1 Encounters:   04/29/22 110/65    Less than 140/90 Yes   Dilated retinal exam : 04/30/2021 Annually Yes   Foot exam   : 04/29/2022 Annually No     Recent URI/allergy symptoms but these have since resolved.    No other new complaints today. Remaining chronic conditions have been reviewed and remain stable. Further detail as stated above.       Past Medical History:  Past Medical History:   Diagnosis Date    Allergy     Anxiety     Asthma     Coronary artery disease 10/18/2018    DM (diabetes mellitus)      Essential hypertension 10/21/2020    IBS (irritable bowel syndrome)     Migraine headache     Mitral valve prolapse     Type 2 diabetes mellitus with hyperlipidemia 9/6/2016     Past Surgical History:   Procedure Laterality Date    BREAST BIOPSY      BREAST LUMPECTOMY      HYSTERECTOMY      partial    [place] COLONOSCOPY,JOHANNE SERVIN/CAUTERY  8/23/2012          Review of patient's allergies indicates:   Allergen Reactions    Codeine      Other reaction(s): Unknown  unknown    Diclofenac Nausea And Vomiting    Doxycycline      Other reaction(s): Unknown  unknown    Iodinated contrast media Hives     Pt reports rxn to iv dye years ago,but no rxn to oral contrast    Sulfa (sulfonamide antibiotics)      Other reaction(s): Unknown  unknown     Social History     Tobacco Use    Smoking status: Never Smoker    Smokeless tobacco: Never Used   Substance Use Topics    Alcohol use: No    Drug use: No     Family History   Problem Relation Age of Onset    Heart disease Mother     Diabetes Brother     Heart disease Brother     Early death Brother     Breast cancer Maternal Aunt      Current Outpatient Medications on File Prior to Visit   Medication Sig Dispense Refill    ALPRAZolam (XANAX) 0.25 MG tablet TAKE 1 TABLET BY MOUTH 4 TIMES DAILY AS NEEDED 90 tablet 5    amitriptyline (ELAVIL) 50 MG tablet TAKE 1 TABLET BY MOUTH EVERY EVENING 90 tablet 0    aspirin (ECOTRIN) 81 MG EC tablet Take 81 mg by mouth once daily.      atorvastatin (LIPITOR) 80 MG tablet Take 80 mg by mouth.      blood sugar diagnostic (CONTOUR TEST STRIPS) Strp TEST BLOOD GLUCOSE ONCE DAILY 100 each 11    clopidogrel (PLAVIX) 75 mg tablet Take 75 mg by mouth once daily.      furosemide (LASIX) 20 MG tablet Take 20 mg by mouth daily as needed.      lancets (MICROLET LANCET) Misc TEST BLOOD GLUCOSE ONCE DAILY 100 each 11    metFORMIN (GLUCOPHAGE) 500 MG tablet TAKE 1 TABLET BY MOUTH TWICE DAILY 180 tablet 1    metoprolol succinate  "(TOPROL-XL) 100 MG 24 hr tablet Take 50 mg by mouth once daily.       nystatin (MYCOSTATIN) powder Apply topically 4 (four) times daily. 60 g 0    [DISCONTINUED] colestipoL (COLESTID) 1 gram Tab Take 1 tablet (1 g total) by mouth 2 (two) times daily. (Patient not taking: Reported on 4/29/2022) 180 tablet 3     No current facility-administered medications on file prior to visit.       Review of Systems   Constitutional: Negative for chills, diaphoresis, fatigue and fever.   HENT: Negative for congestion, ear pain, postnasal drip, sinus pain and sore throat.    Eyes: Negative for pain and redness.   Respiratory: Negative for cough, chest tightness and shortness of breath.    Cardiovascular: Negative for chest pain and leg swelling.   Gastrointestinal: Negative for abdominal pain, constipation, diarrhea, nausea and vomiting.   Genitourinary: Negative for dysuria and hematuria.   Musculoskeletal: Negative for arthralgias and joint swelling.   Skin: Negative for rash.   Neurological: Negative for dizziness, syncope and headaches.       Vitals:    04/29/22 0814   BP: 110/65   Pulse: 62   Temp: 98.2 °F (36.8 °C)   Weight: 67.6 kg (149 lb)   Height: 5' 6" (1.676 m)       Wt Readings from Last 3 Encounters:   04/29/22 67.6 kg (149 lb)   12/28/21 71.2 kg (156 lb 15.5 oz)   11/02/20 71.2 kg (157 lb)       Physical Exam  Constitutional:       General: She is not in acute distress.     Appearance: Normal appearance. She is well-developed.   HENT:      Head: Normocephalic and atraumatic.   Eyes:      Conjunctiva/sclera: Conjunctivae normal.   Cardiovascular:      Rate and Rhythm: Normal rate and regular rhythm.      Pulses: Normal pulses.      Heart sounds: Normal heart sounds. No murmur heard.  Pulmonary:      Effort: Pulmonary effort is normal. No respiratory distress.      Breath sounds: Normal breath sounds.   Abdominal:      General: Bowel sounds are normal. There is no distension.      Palpations: Abdomen is soft.      " Tenderness: There is no abdominal tenderness.   Musculoskeletal:         General: Normal range of motion.      Cervical back: Normal range of motion and neck supple.   Skin:     General: Skin is warm and dry.      Findings: No rash.   Neurological:      General: No focal deficit present.      Mental Status: She is alert and oriented to person, place, and time.       Protective Sensation (w/ 10 gram monofilament):  Right: Intact  Left: Intact    Visual Inspection:  Normal -  Bilateral    Pedal Pulses:   Right: Present  Left: Present    Posterior tibialis:   Right:Present  Left: Present        Health Maintenance   Topic Date Due    TETANUS VACCINE  Never done    Hemoglobin A1c  02/03/2022    Lipid Panel  04/28/2022    Eye Exam  04/30/2022    Aspirin/Antiplatelet Therapy  04/29/2023    Foot Exam  04/29/2023    DEXA Scan  10/30/2023

## 2022-05-04 NOTE — TELEPHONE ENCOUNTER
No new care gaps identified.  [facility] Embedded Care Gaps. Reference number: [id number]. 5/04/2022   6:30:36 PM CDT

## 2022-05-05 RX ORDER — METFORMIN HYDROCHLORIDE 500 MG/1
TABLET ORAL
Qty: 180 TABLET | Refills: 1 | Status: SHIPPED | OUTPATIENT
Start: 2022-05-05 | End: 2022-11-18

## 2022-05-05 NOTE — TELEPHONE ENCOUNTER
Refill Authorization Note   Alon Chatman  is requesting a refill authorization.  Brief Assessment and Rationale for Refill:  Approve     Medication Therapy Plan:       Medication Reconciliation Completed: No   Comments:     No Care Gaps recommended.     Note composed:9:31 AM 05/05/2022

## 2022-05-09 DIAGNOSIS — E11.69 TYPE 2 DIABETES MELLITUS WITH HYPERLIPIDEMIA: Primary | ICD-10-CM

## 2022-05-09 DIAGNOSIS — E78.5 TYPE 2 DIABETES MELLITUS WITH HYPERLIPIDEMIA: Primary | ICD-10-CM

## 2022-05-09 NOTE — PROGRESS NOTES
Results have been released via [employer]. Please verify that these have been viewed by patient. If not, please call patient with results.    Please schedule the following orders:  A1c, BMP in 6 months  I have sent a message to them with the following interpretation (see below).        I have reviewed your recent blood work.     Your metabolic panel which shows your electrolytes, glucose, kidney and liver function is within normal limits.    Your cholesterol is within normal limits.    Your hemoglobin A1c is stable at 7.     Please do not hesitate to call or message with any additional questions or concerns.    Padmini Boucher MD

## 2022-05-27 ENCOUNTER — PATIENT MESSAGE (OUTPATIENT)
Dept: FAMILY MEDICINE | Facility: CLINIC | Age: 82
End: 2022-05-27
Payer: MEDICARE

## 2022-06-09 DIAGNOSIS — E11.69 TYPE 2 DIABETES MELLITUS WITH HYPERLIPIDEMIA: ICD-10-CM

## 2022-06-09 DIAGNOSIS — E78.5 TYPE 2 DIABETES MELLITUS WITH HYPERLIPIDEMIA: ICD-10-CM

## 2022-06-09 RX ORDER — LANCETS
EACH MISCELLANEOUS
Qty: 100 EACH | Refills: 3 | Status: SHIPPED | OUTPATIENT
Start: 2022-06-09

## 2022-06-09 NOTE — TELEPHONE ENCOUNTER
Refill Authorization Note   Alon Chatman  is requesting a refill authorization.  Brief Assessment and Rationale for Refill:  Approve     Medication Therapy Plan:       Medication Reconciliation Completed: No   Comments:     No Care Gaps recommended.     Note composed:6:45 PM 06/09/2022

## 2022-06-09 NOTE — TELEPHONE ENCOUNTER
No new care gaps identified.  [facility] Embedded Care Gaps. Reference number: [id number]. 6/09/2022   5:34:15 PM CDT

## 2022-09-21 ENCOUNTER — TELEPHONE (OUTPATIENT)
Dept: FAMILY MEDICINE | Facility: CLINIC | Age: 82
End: 2022-09-21
Payer: MEDICARE

## 2022-09-21 NOTE — TELEPHONE ENCOUNTER
----- Message from Chyna Shirley sent at 9/21/2022  8:34 AM CDT -----  Contact: self/[phone]  Type:  Same Day Appointment Request    Caller is requesting a same day appointment.  Caller declined first available appointment listed below.    Name of Caller:Alon Chatman  When is the first available appointment?  Symptoms:feeling faint  Best Call Back Number:[phone]  Additional Information:

## 2022-09-21 NOTE — TELEPHONE ENCOUNTER
Call returned. No appointment availability in clinic today. Appointment scheduled with NP tomorrow at 1:40 pm.

## 2022-09-22 ENCOUNTER — OFFICE VISIT (OUTPATIENT)
Dept: FAMILY MEDICINE | Facility: CLINIC | Age: 82
End: 2022-09-22
Payer: MEDICARE

## 2022-09-22 VITALS
TEMPERATURE: 97 F | HEIGHT: 66 IN | SYSTOLIC BLOOD PRESSURE: 136 MMHG | HEART RATE: 74 BPM | BODY MASS INDEX: 23.38 KG/M2 | WEIGHT: 145.5 LBS | OXYGEN SATURATION: 100 % | DIASTOLIC BLOOD PRESSURE: 76 MMHG

## 2022-09-22 DIAGNOSIS — K58.0 IRRITABLE BOWEL SYNDROME WITH DIARRHEA: Primary | ICD-10-CM

## 2022-09-22 PROCEDURE — G0008 ADMIN INFLUENZA VIRUS VAC: HCPCS | Mod: PBBFAC,PO

## 2022-09-22 PROCEDURE — 99214 OFFICE O/P EST MOD 30 MIN: CPT | Mod: S$PBB,,, | Performed by: NURSE PRACTITIONER

## 2022-09-22 PROCEDURE — 99214 OFFICE O/P EST MOD 30 MIN: CPT | Mod: PBBFAC,PO | Performed by: NURSE PRACTITIONER

## 2022-09-22 PROCEDURE — 99999 PR PBB SHADOW E&M-EST. PATIENT-LVL IV: ICD-10-PCS | Mod: PBBFAC,,, | Performed by: NURSE PRACTITIONER

## 2022-09-22 PROCEDURE — 99999 PR PBB SHADOW E&M-EST. PATIENT-LVL IV: CPT | Mod: PBBFAC,,, | Performed by: NURSE PRACTITIONER

## 2022-09-22 PROCEDURE — 99214 PR OFFICE/OUTPT VISIT, EST, LEVL IV, 30-39 MIN: ICD-10-PCS | Mod: S$PBB,,, | Performed by: NURSE PRACTITIONER

## 2022-09-22 RX ORDER — DIPHENOXYLATE HYDROCHLORIDE AND ATROPINE SULFATE 2.5; .025 MG/1; MG/1
1 TABLET ORAL 2 TIMES DAILY PRN
Qty: 30 TABLET | Refills: 0 | Status: SHIPPED | OUTPATIENT
Start: 2022-09-22 | End: 2022-10-02

## 2022-09-22 NOTE — PROGRESS NOTES
Subjective:       Patient ID: Alon Chatman is a 82 y.o. female.    Chief Complaint: Diarrhea (Since last weekend.)    Diarrhea   This is a chronic problem. The current episode started more than 1 year ago. The problem occurs less than 2 times per day. The problem has been unchanged. The stool consistency is described as Watery. The patient states that diarrhea does not awaken her from sleep. Associated symptoms include abdominal pain. Pertinent negatives include no arthralgias, coughing, fever, headaches, myalgias or vomiting. Exacerbated by: eating. Treatments tried: imodium, colestid. The treatment provided mild relief.     Review of Systems   Constitutional:  Negative for fatigue, fever and unexpected weight change.   HENT:  Negative for ear pain and sore throat.    Eyes:  Negative for pain and visual disturbance.   Respiratory:  Negative for cough and shortness of breath.    Cardiovascular:  Negative for chest pain and palpitations.   Gastrointestinal:  Positive for abdominal pain and diarrhea. Negative for vomiting.   Musculoskeletal:  Negative for arthralgias and myalgias.   Skin:  Negative for color change and rash.   Neurological:  Negative for dizziness and headaches.   Psychiatric/Behavioral:  Negative for dysphoric mood and sleep disturbance. The patient is not nervous/anxious.      Vitals:    09/22/22 1405   BP: 136/76   Pulse: 74   Temp: 97.2 °F (36.2 °C)       Objective:     Current Outpatient Medications   Medication Sig Dispense Refill    ALPRAZolam (XANAX) 0.25 MG tablet TAKE 1 TABLET BY MOUTH 4 TIMES DAILY AS NEEDED 90 tablet 5    amitriptyline (ELAVIL) 50 MG tablet TAKE 1 TABLET BY MOUTH EVERY EVENING 90 tablet 0    aspirin (ECOTRIN) 81 MG EC tablet Take 81 mg by mouth once daily.      atorvastatin (LIPITOR) 80 MG tablet Take 80 mg by mouth.      blood sugar diagnostic (CONTOUR TEST STRIPS) Strp TEST BLOOD GLUCOSE ONCE DAILY 100 each 11    clopidogrel (PLAVIX) 75 mg tablet Take 75 mg  by mouth once daily.      furosemide (LASIX) 20 MG tablet Take 20 mg by mouth daily as needed.      lancets (MICROLET LANCET) Misc TEST BLOOD GLUCOSE ONCE DAILY 100 each 3    metFORMIN (GLUCOPHAGE) 500 MG tablet TAKE 1 TABLET BY MOUTH TWICE DAILY 180 tablet 1    metoprolol succinate (TOPROL-XL) 100 MG 24 hr tablet Take 50 mg by mouth once daily.       nystatin (MYCOSTATIN) powder Apply topically 4 (four) times daily. 60 g 0    diphenoxylate-atropine 2.5-0.025 mg (LOMOTIL) 2.5-0.025 mg per tablet Take 1 tablet by mouth 2 (two) times daily as needed for Diarrhea. 30 tablet 0     No current facility-administered medications for this visit.       Physical Exam  Vitals and nursing note reviewed.   Constitutional:       General: She is not in acute distress.     Appearance: She is well-developed.   HENT:      Head: Normocephalic and atraumatic.   Eyes:      Pupils: Pupils are equal, round, and reactive to light.   Cardiovascular:      Rate and Rhythm: Normal rate and regular rhythm.   Pulmonary:      Effort: Pulmonary effort is normal.      Breath sounds: Normal breath sounds.   Musculoskeletal:         General: Normal range of motion.      Cervical back: Normal range of motion and neck supple.   Skin:     General: Skin is warm and dry.      Findings: No rash.   Neurological:      Mental Status: She is alert and oriented to person, place, and time.   Psychiatric:         Judgment: Judgment normal.       Assessment:       1. Irritable bowel syndrome with diarrhea        Plan:   Irritable bowel syndrome with diarrhea    Other orders  -     diphenoxylate-atropine 2.5-0.025 mg (LOMOTIL) 2.5-0.025 mg per tablet; Take 1 tablet by mouth 2 (two) times daily as needed for Diarrhea.  Dispense: 30 tablet; Refill: 0  -     Influenza - Quadrivalent (Adjuvanted)    Restart Colestid daily, use lomotil on an as needed basis    No follow-ups on file.    There are no Patient Instructions on file for this visit.

## 2022-09-30 LAB
LEFT EYE DM RETINOPATHY: NEGATIVE
RIGHT EYE DM RETINOPATHY: NEGATIVE

## 2022-11-02 ENCOUNTER — OFFICE VISIT (OUTPATIENT)
Dept: FAMILY MEDICINE | Facility: CLINIC | Age: 82
End: 2022-11-02
Payer: MEDICARE

## 2022-11-02 VITALS
WEIGHT: 148.69 LBS | HEART RATE: 66 BPM | SYSTOLIC BLOOD PRESSURE: 124 MMHG | BODY MASS INDEX: 23.89 KG/M2 | TEMPERATURE: 98 F | HEIGHT: 66 IN | DIASTOLIC BLOOD PRESSURE: 60 MMHG

## 2022-11-02 DIAGNOSIS — R25.1 TREMOR: ICD-10-CM

## 2022-11-02 DIAGNOSIS — K58.0 IRRITABLE BOWEL SYNDROME WITH DIARRHEA: Primary | ICD-10-CM

## 2022-11-02 DIAGNOSIS — I25.10 CORONARY ARTERY DISEASE INVOLVING NATIVE CORONARY ARTERY OF NATIVE HEART WITHOUT ANGINA PECTORIS: ICD-10-CM

## 2022-11-02 DIAGNOSIS — E78.5 TYPE 2 DIABETES MELLITUS WITH HYPERLIPIDEMIA: ICD-10-CM

## 2022-11-02 DIAGNOSIS — I10 ESSENTIAL HYPERTENSION: ICD-10-CM

## 2022-11-02 DIAGNOSIS — E11.69 TYPE 2 DIABETES MELLITUS WITH HYPERLIPIDEMIA: ICD-10-CM

## 2022-11-02 PROCEDURE — 99214 OFFICE O/P EST MOD 30 MIN: CPT | Mod: PBBFAC,PO | Performed by: INTERNAL MEDICINE

## 2022-11-02 PROCEDURE — 99999 PR PBB SHADOW E&M-EST. PATIENT-LVL IV: CPT | Mod: PBBFAC,,, | Performed by: INTERNAL MEDICINE

## 2022-11-02 PROCEDURE — 99214 OFFICE O/P EST MOD 30 MIN: CPT | Mod: S$PBB,,, | Performed by: INTERNAL MEDICINE

## 2022-11-02 PROCEDURE — 99999 PR PBB SHADOW E&M-EST. PATIENT-LVL IV: ICD-10-PCS | Mod: PBBFAC,,, | Performed by: INTERNAL MEDICINE

## 2022-11-02 PROCEDURE — 99214 PR OFFICE/OUTPT VISIT, EST, LEVL IV, 30-39 MIN: ICD-10-PCS | Mod: S$PBB,,, | Performed by: INTERNAL MEDICINE

## 2022-11-02 RX ORDER — FLUOROMETHOLONE 1 MG/ML
1 SUSPENSION/ DROPS OPHTHALMIC 4 TIMES DAILY
COMMUNITY
Start: 2022-09-30

## 2022-11-02 RX ORDER — LIFITEGRAST 50 MG/ML
1 SOLUTION/ DROPS OPHTHALMIC 2 TIMES DAILY
COMMUNITY
Start: 2022-09-30

## 2022-11-02 RX ORDER — MONTELUKAST SODIUM 4 MG/1
1 TABLET, CHEWABLE ORAL 2 TIMES DAILY
COMMUNITY
End: 2023-01-12 | Stop reason: SDUPTHER

## 2022-11-03 ENCOUNTER — LAB VISIT (OUTPATIENT)
Dept: LAB | Facility: HOSPITAL | Age: 82
End: 2022-11-03
Attending: INTERNAL MEDICINE
Payer: MEDICARE

## 2022-11-03 DIAGNOSIS — E78.5 TYPE 2 DIABETES MELLITUS WITH HYPERLIPIDEMIA: ICD-10-CM

## 2022-11-03 DIAGNOSIS — E11.69 TYPE 2 DIABETES MELLITUS WITH HYPERLIPIDEMIA: ICD-10-CM

## 2022-11-03 LAB
ALBUMIN/CREAT UR: 21.9 UG/MG (ref 0–30)
CREAT UR-MCNC: 137 MG/DL (ref 15–325)
MICROALBUMIN UR DL<=1MG/L-MCNC: 30 UG/ML

## 2022-11-03 PROCEDURE — 82570 ASSAY OF URINE CREATININE: CPT | Performed by: INTERNAL MEDICINE

## 2022-11-03 PROCEDURE — 82043 UR ALBUMIN QUANTITATIVE: CPT | Performed by: INTERNAL MEDICINE

## 2022-11-04 NOTE — PROGRESS NOTES
Assessment/Plan:    Problem List Items Addressed This Visit          Cardiac/Vascular    Coronary artery disease    Overview     -hx of STEMI in 2015 and PCI with DIANA to LAD at that time  -remains on ASA/statin. On beta-blocker  -followed by [facility], Dr. Henning  -denies symptoms of angina or dyspnea         Essential hypertension    Overview     Hypertension Medications               furosemide (LASIX) 20 MG tablet Take 20 mg by mouth daily as needed.    metoprolol succinate (TOPROL-XL) 100 MG 24 hr tablet Take 50 mg by mouth once daily.    -at goal today  -continue lifestyle modification with low sodium diet and exercise   -discussed hypertension disease course and importance of treating high blood pressure  -patient understood and advised of risk of untreated blood pressure.  ER precautions were given   for symptoms of hypertensive urgency and emergency.            Endocrine    Type 2 diabetes mellitus with hyperlipidemia    Overview     Diabetes Medications               metFORMIN (GLUCOPHAGE) 500 MG tablet TAKE 1 TABLET BY MOUTH TWICE DAILY   -condition is currently controlled  -plan to repeat a1c  -see diabetic health maintenance listed below  -on statin: Yes  -on ACE-I/ARB: No  -counseling provided on importance of diabetic diet and medication compliance in order to treat diabetes  -discussed diabetes disease course and potential complications         Relevant Orders    Microalbumin/Creatinine Ratio, Urine       GI    IBS (irritable bowel syndrome) - Primary    Overview     -chronic hx of IBS with diarrhea  -evaluated by GI and colorectal in the past  -symptoms controlled with BID Colestid              Follow up in about 6 months (around 5/2/2023).    Padmini Boucher MD  _____________________________________________________________________________________________________________________________________________________    CC: follow up of chronic medical conditions     HPI:    Patient is in clinic today  as an established patient.    HTN: The patient is currently being treated for essential hypertension. This condition is chronic and stable. The patient is tolerating their medication well with good compliance.  Denies any adverse effects of medications.  Counseling was offered regarding low sodium diet.  The patient has a reduced salt intake. Routine exercise recommended. The patient denies headache, vision changes, chest pain, palpitations, shortness of breath, or lower extremity edema.    DM2: Patient presents for follow up of diabetes. Condition is chronic and stable. Patient denies symptoms, including foot ulcerations, hyperglycemia, hypoglycemia , nausea, paresthesia of the feet, polydipsia, polyuria and visual disturbances.  Evaluation to date has been included: fasting blood sugar, fasting lipid panel, hemoglobin A1C and microalbuminuria.  Denies adverse effects of current medications.     Diabetes Management Status    Statin: Taking  ACE/ARB: Not taking    Screening or Prevention Patient's value Goal Complete/Controlled?   HgA1C Testing and Control   Lab Results   Component Value Date    HGBA1C 6.4 (H) 11/02/2022      Annually/Less than 8% Yes     Lipid profile : 04/29/2022 Annually Yes     LDL control Lab Results   Component Value Date    LDLCALC 67.4 04/29/2022    Annually/Less than 100 mg/dl  Yes     Nephropathy screening Lab Results   Component Value Date    LABMICR 30.0 11/03/2022     Lab Results   Component Value Date    PROTEINUA SEE COMMENT 12/08/2020    Annually Yes     Blood pressure BP Readings from Last 1 Encounters:   11/02/22 124/60    Less than 140/90 Yes     Dilated retinal exam : 04/30/2021 Annually Yes     Foot exam   Most Recent Foot Exam Date: Not Found Annually Yes           No other new complaints today.  Remaining chronic conditions have been reviewed and remain stable. Further detail as stated above.      reviewed.     No recent changes to medical/surgical history.    Current  Outpatient Medications on File Prior to Visit   Medication Sig Dispense Refill    ALPRAZolam (XANAX) 0.25 MG tablet TAKE 1 TABLET BY MOUTH 4 TIMES DAILY AS NEEDED 90 tablet 5    amitriptyline (ELAVIL) 50 MG tablet TAKE 1 TABLET BY MOUTH EVERY EVENING 90 tablet 0    aspirin (ECOTRIN) 81 MG EC tablet Take 81 mg by mouth once daily.      atorvastatin (LIPITOR) 80 MG tablet Take 80 mg by mouth.      blood sugar diagnostic (CONTOUR TEST STRIPS) Strp TEST BLOOD GLUCOSE ONCE DAILY 100 each 11    clopidogrel (PLAVIX) 75 mg tablet Take 75 mg by mouth once daily.      colestipoL (COLESTID) 1 gram Tab Take 1 g by mouth 2 (two) times daily.      fluorometholone 0.1% (FML) 0.1 % DrpS Place 1 drop into both eyes 4 (four) times daily.      furosemide (LASIX) 20 MG tablet Take 20 mg by mouth daily as needed.      lancets (MICROLET LANCET) Misc TEST BLOOD GLUCOSE ONCE DAILY 100 each 3    lifitegrast (XIIDRA) 5 % Dpet Place 1 drop into both eyes 2 (two) times daily.      metFORMIN (GLUCOPHAGE) 500 MG tablet TAKE 1 TABLET BY MOUTH TWICE DAILY 180 tablet 1    metoprolol succinate (TOPROL-XL) 100 MG 24 hr tablet Take 50 mg by mouth once daily.       nystatin (MYCOSTATIN) powder Apply topically 4 (four) times daily. 60 g 0     No current facility-administered medications on file prior to visit.       Review of Systems   Constitutional:  Negative for chills, diaphoresis, fatigue and fever.   HENT:  Negative for congestion, ear pain, postnasal drip, sinus pain and sore throat.    Eyes:  Negative for pain and redness.   Respiratory:  Negative for cough, chest tightness and shortness of breath.    Cardiovascular:  Negative for chest pain and leg swelling.   Gastrointestinal:  Negative for abdominal pain, constipation, diarrhea, nausea and vomiting.   Genitourinary:  Negative for dysuria and hematuria.   Musculoskeletal:  Negative for arthralgias and joint swelling.   Skin:  Negative for rash.   Neurological:  Negative for  "dizziness, syncope and headaches.   Psychiatric/Behavioral:  Negative for dysphoric mood. The patient is not nervous/anxious.      Vitals:    11/02/22 1009   BP: 124/60   Pulse: 66   Temp: 97.8 °F (36.6 °C)   Weight: 67.4 kg (148 lb 11.2 oz)   Height: 5' 6" (1.676 m)       Wt Readings from Last 3 Encounters:   11/02/22 67.4 kg (148 lb 11.2 oz)   09/22/22 66 kg (145 lb 8.1 oz)   04/29/22 67.6 kg (149 lb)       Physical Exam  Constitutional:       General: She is not in acute distress.     Appearance: Normal appearance. She is well-developed.   HENT:      Head: Normocephalic and atraumatic.   Eyes:      Conjunctiva/sclera: Conjunctivae normal.   Cardiovascular:      Rate and Rhythm: Normal rate and regular rhythm.      Pulses: Normal pulses.      Heart sounds: Normal heart sounds. No murmur heard.  Pulmonary:      Effort: Pulmonary effort is normal. No respiratory distress.      Breath sounds: Normal breath sounds.   Abdominal:      General: Bowel sounds are normal. There is no distension.      Palpations: Abdomen is soft.      Tenderness: There is no abdominal tenderness.   Musculoskeletal:         General: Normal range of motion.      Cervical back: Normal range of motion and neck supple.   Skin:     General: Skin is warm and dry.      Findings: No rash.   Neurological:      General: No focal deficit present.      Mental Status: She is alert and oriented to person, place, and time.   Psychiatric:         Mood and Affect: Mood normal.         Behavior: Behavior normal.     Health Maintenance   Topic Date Due    TETANUS VACCINE  Never done    Eye Exam  04/30/2022    Lipid Panel  04/29/2023    Hemoglobin A1c  05/02/2023    DEXA Scan  10/30/2023    Aspirin/Antiplatelet Therapy  11/02/2023       "

## 2022-11-04 NOTE — PROGRESS NOTES
Results have been released via [employer]. Please verify that these have been viewed by patient. If not, please call patient with results.    I have reviewed your recent urine study.    The urine test was normal. There is no significant amount of protein in your urine that would indicate diabetic kidney disease. We will plan to repeat this test in one year    Please let me know if you have any additional questions or concerns.

## 2022-12-02 ENCOUNTER — OFFICE VISIT (OUTPATIENT)
Dept: FAMILY MEDICINE | Facility: CLINIC | Age: 82
End: 2022-12-02
Payer: MEDICARE

## 2022-12-02 VITALS
DIASTOLIC BLOOD PRESSURE: 62 MMHG | WEIGHT: 143.31 LBS | TEMPERATURE: 98 F | HEART RATE: 78 BPM | HEIGHT: 66 IN | SYSTOLIC BLOOD PRESSURE: 108 MMHG | OXYGEN SATURATION: 95 % | BODY MASS INDEX: 23.03 KG/M2

## 2022-12-02 DIAGNOSIS — J02.9 SORE THROAT: ICD-10-CM

## 2022-12-02 DIAGNOSIS — R05.9 COUGH, UNSPECIFIED TYPE: ICD-10-CM

## 2022-12-02 DIAGNOSIS — J10.1 INFLUENZA A: Primary | ICD-10-CM

## 2022-12-02 LAB
CTP QC/QA: YES
GROUP A STREP, MOLECULAR: NEGATIVE
POC MOLECULAR INFLUENZA A AGN: POSITIVE
POC MOLECULAR INFLUENZA B AGN: NEGATIVE

## 2022-12-02 PROCEDURE — 99213 OFFICE O/P EST LOW 20 MIN: CPT | Mod: S$PBB,,, | Performed by: NURSE PRACTITIONER

## 2022-12-02 PROCEDURE — 99999 PR PBB SHADOW E&M-EST. PATIENT-LVL IV: ICD-10-PCS | Mod: PBBFAC,,, | Performed by: NURSE PRACTITIONER

## 2022-12-02 PROCEDURE — 99213 PR OFFICE/OUTPT VISIT, EST, LEVL III, 20-29 MIN: ICD-10-PCS | Mod: S$PBB,,, | Performed by: NURSE PRACTITIONER

## 2022-12-02 PROCEDURE — 87651 STREP A DNA AMP PROBE: CPT | Mod: PO | Performed by: NURSE PRACTITIONER

## 2022-12-02 PROCEDURE — 99214 OFFICE O/P EST MOD 30 MIN: CPT | Mod: PBBFAC,PO | Performed by: NURSE PRACTITIONER

## 2022-12-02 PROCEDURE — 99999 PR PBB SHADOW E&M-EST. PATIENT-LVL IV: CPT | Mod: PBBFAC,,, | Performed by: NURSE PRACTITIONER

## 2022-12-02 PROCEDURE — 87502 INFLUENZA DNA AMP PROBE: CPT | Mod: PBBFAC,PO | Performed by: NURSE PRACTITIONER

## 2022-12-02 RX ORDER — PROMETHAZINE HYDROCHLORIDE AND DEXTROMETHORPHAN HYDROBROMIDE 6.25; 15 MG/5ML; MG/5ML
5 SYRUP ORAL EVERY 6 HOURS PRN
Qty: 200 ML | Refills: 0 | Status: SHIPPED | OUTPATIENT
Start: 2022-12-02 | End: 2022-12-12

## 2022-12-02 RX ORDER — OSELTAMIVIR PHOSPHATE 75 MG/1
75 CAPSULE ORAL 2 TIMES DAILY
Qty: 10 CAPSULE | Refills: 0 | Status: SHIPPED | OUTPATIENT
Start: 2022-12-02 | End: 2022-12-07

## 2022-12-02 NOTE — PROGRESS NOTES
"Assessment/Plan:  Problem List Items Addressed This Visit    None  Visit Diagnoses       Influenza A    -  Primary    Relevant Medications    oseltamivir (TAMIFLU) 75 MG capsule    Sore throat        Relevant Orders    POCT Influenza A/B Molecular (Completed)    Group A Strep, Molecular    Cough, unspecified type        Relevant Medications    promethazine-dextromethorphan (PROMETHAZINE-DM) 6.25-15 mg/5 mL Syrp          Influenza A- positive  Start Tamiflu as ordered  Supportive care- rest, increase hydration with water, OTC Tylenol/Ibuprofen for pain/fever.  Follow up with PCP if no improvement in symptoms   ER precautions for severe symptoms     Follow up in about 3 months (around 3/2/2023), or if symptoms worsen or fail to improve.    Saige Keenan NP  _____________________________________________________________________________________________________________________________________________________    CC: cough    HPI: Patient is in clinic today as an established patient here for cough. This is a new problem. The current episode started a few days ago. The problem is gradually worsening. There has been no fever. She is experiencing no pain. Associated symptoms include congestion, sore throat, hoarseness, weakness, fatigue, and sinus pressure. She reports that she fell the yesterday trying to get dressed. She fell backwards hitting clothes hamper. She did not hit her head. She has had no limitation in ROM or gait problems. She states that she feels "sore" from fall. Pertinent negatives include no chills, diaphoresis, ear pain, headaches, neck pain, shortness of breath, sneezing, dizziness, nausea, vomiting, diarrhea, or swollen glands. Past treatments include Mucinex DM. The treatment provided no relief. She has had no known sick contacts. She is fully vaccinated for covid. She is vaccinated for influenza.     Past Medical History:  Past Medical History:   Diagnosis Date    Allergy     Anxiety     Asthma     " Coronary artery disease 10/18/2018    DM (diabetes mellitus)     Essential hypertension 10/21/2020    IBS (irritable bowel syndrome)     Migraine headache     Mitral valve prolapse     Type 2 diabetes mellitus with hyperlipidemia 9/6/2016     Past Surgical History:   Procedure Laterality Date    BREAST BIOPSY      BREAST LUMPECTOMY      HYSTERECTOMY      partial    [place] COLONOSCOPY,OCTAV[place] HINDSFORCEP/CAUTERY  8/23/2012          Review of patient's allergies indicates:   Allergen Reactions    Codeine      Other reaction(s): Unknown  unknown    Diclofenac Nausea And Vomiting    Doxycycline      Other reaction(s): Unknown  unknown    Iodinated contrast media Hives     Pt reports rxn to iv dye years ago,but no rxn to oral contrast    Sulfa (sulfonamide antibiotics)      Other reaction(s): Unknown  unknown     Social History     Tobacco Use    Smoking status: Never    Smokeless tobacco: Never   Substance Use Topics    Alcohol use: No    Drug use: No     Family History   Problem Relation Age of Onset    Heart disease Mother     Diabetes Brother     Heart disease Brother     Early death Brother     Breast cancer Maternal Aunt      Current Outpatient Medications on File Prior to Visit   Medication Sig Dispense Refill    ALPRAZolam (XANAX) 0.25 MG tablet TAKE 1 TABLET BY MOUTH 4 TIMES DAILY AS NEEDED 90 tablet 5    amitriptyline (ELAVIL) 50 MG tablet TAKE 1 TABLET BY MOUTH EVERY EVENING 90 tablet 0    aspirin (ECOTRIN) 81 MG EC tablet Take 81 mg by mouth once daily.      atorvastatin (LIPITOR) 80 MG tablet Take 80 mg by mouth.      blood sugar diagnostic (CONTOUR TEST STRIPS) Strp TEST BLOOD GLUCOSE ONCE DAILY 100 each 11    clopidogrel (PLAVIX) 75 mg tablet Take 75 mg by mouth once daily.      colestipoL (COLESTID) 1 gram Tab Take 1 g by mouth 2 (two) times daily.      fluorometholone 0.1% (FML) 0.1 % DrpS Place 1 drop into both eyes 4 (four) times daily.      furosemide (LASIX) 20 MG tablet Take 20 mg by mouth daily as needed.  "     lancets (MICROLET LANCET) Misc TEST BLOOD GLUCOSE ONCE DAILY 100 each 3    lifitegrast (XIIDRA) 5 % Dpet Place 1 drop into both eyes 2 (two) times daily.      metFORMIN (GLUCOPHAGE) 500 MG tablet Take 1 tablet (500 mg total) by mouth 2 (two) times daily. 180 tablet 3    metoprolol succinate (TOPROL-XL) 100 MG 24 hr tablet Take 50 mg by mouth once daily.       nystatin (MYCOSTATIN) powder Apply topically 4 (four) times daily. 60 g 0     No current facility-administered medications on file prior to visit.     Review of Systems   Constitutional:  Positive for fatigue. Negative for appetite change, chills and fever.   HENT:  Positive for congestion, sinus pressure, sore throat and voice change (hoarseness). Negative for rhinorrhea.    Eyes:  Negative for visual disturbance.   Respiratory:  Positive for cough. Negative for shortness of breath.    Cardiovascular:  Negative for chest pain, palpitations and leg swelling.   Gastrointestinal:  Negative for abdominal pain, diarrhea and vomiting.   Genitourinary:  Negative for difficulty urinating, dysuria and hematuria.   Musculoskeletal:  Negative for arthralgias and myalgias.   Skin:  Negative for rash and wound.   Neurological:  Positive for weakness. Negative for dizziness and headaches.   Psychiatric/Behavioral:  Negative for behavioral problems. The patient is not nervous/anxious.      Vitals:    12/02/22 1359   BP: 108/62   BP Location: Right arm   Pulse: 78   Temp: 97.8 °F (36.6 °C)   TempSrc: Other (see comments)   SpO2: 95%   Weight: 65 kg (143 lb 4.8 oz)   Height: 5' 6" (1.676 m)     Wt Readings from Last 3 Encounters:   12/02/22 65 kg (143 lb 4.8 oz)   11/02/22 67.4 kg (148 lb 11.2 oz)   09/22/22 66 kg (145 lb 8.1 oz)     Physical Exam  Vitals reviewed.   Constitutional:       General: She is not in acute distress.     Appearance: Normal appearance. She is not ill-appearing.   HENT:      Head: Normocephalic and atraumatic.      Right Ear: Tympanic membrane and " external ear normal.      Left Ear: Tympanic membrane and external ear normal.      Nose: Congestion present.      Mouth/Throat:      Mouth: Mucous membranes are moist.      Comments: Hoarseness of voice  Eyes:      Extraocular Movements: Extraocular movements intact.      Conjunctiva/sclera: Conjunctivae normal.   Cardiovascular:      Rate and Rhythm: Normal rate.      Heart sounds: Normal heart sounds.   Pulmonary:      Effort: Pulmonary effort is normal. No respiratory distress.      Breath sounds: Normal breath sounds.      Comments: Coughing during exam  Abdominal:      General: Abdomen is flat. There is no distension.   Musculoskeletal:         General: Normal range of motion.      Cervical back: Normal range of motion and neck supple.   Lymphadenopathy:      Cervical: No cervical adenopathy (nontender).   Skin:     General: Skin is warm and dry.      Coloration: Skin is not pale.      Findings: No rash.   Neurological:      General: No focal deficit present.      Mental Status: She is alert and oriented to person, place, and time. Mental status is at baseline.   Psychiatric:         Mood and Affect: Mood normal.         Speech: Speech normal.         Behavior: Behavior normal. Behavior is cooperative.     Health Maintenance   Topic Date Due    TETANUS VACCINE  Never done    Eye Exam  04/30/2022    Lipid Panel  04/29/2023    Hemoglobin A1c  05/02/2023    DEXA Scan  10/30/2023    Aspirin/Antiplatelet Therapy  12/02/2023

## 2022-12-05 ENCOUNTER — PATIENT OUTREACH (OUTPATIENT)
Dept: ADMINISTRATIVE | Facility: HOSPITAL | Age: 82
End: 2022-12-05
Payer: MEDICARE

## 2022-12-05 NOTE — PROGRESS NOTES
GAP: MD Ayana Duff, LPN   Eye exam in care everywhere      DM Eye found in Media uploaded & linked.

## 2022-12-08 ENCOUNTER — TELEPHONE (OUTPATIENT)
Dept: FAMILY MEDICINE | Facility: CLINIC | Age: 82
End: 2022-12-08
Payer: MEDICARE

## 2022-12-08 DIAGNOSIS — R09.81 SINUS CONGESTION: Primary | ICD-10-CM

## 2022-12-08 DIAGNOSIS — R05.9 COUGH, UNSPECIFIED TYPE: ICD-10-CM

## 2022-12-08 NOTE — TELEPHONE ENCOUNTER
----- Message from Radha Amanda sent at 12/8/2022  1:30 PM CST -----  Contact: Alon Chi was seen 12/02/2022 but is still having congestion and shortness of breath, bad cough and spitting heavy yellow mucus. The cough syrup helped a little but not much.       [id number] - [place] - [address] [facility]  [address] [facility] [address]  Phone: [phone] Fax: [phone]      Please call Alon at [phone] (home)      Thanks

## 2022-12-08 NOTE — TELEPHONE ENCOUNTER
I have signed for the following orders AND/OR meds.  Please call the patient and ask the patient to schedule the testing AND/OR inform about any medications that were sent.     Orders Placed This Encounter   Procedures    X-Ray Chest PA And Lateral     Standing Status:   Future     Standing Expiration Date:   12/8/2023     Order Specific Question:   May the Radiologist modify the order per protocol to meet the clinical needs of the patient?     Answer:   Yes     Order Specific Question:   Release to patient     Answer:   Immediate          [id number] - [place] [address] [address]  Phone: [phone] Fax: [phone]    [id number] - [place] - [address]  [address] [place] [address]  Phone: [phone] Fax: [phone]

## 2022-12-09 ENCOUNTER — TELEPHONE (OUTPATIENT)
Dept: FAMILY MEDICINE | Facility: CLINIC | Age: 82
End: 2022-12-09
Payer: MEDICARE

## 2022-12-09 NOTE — TELEPHONE ENCOUNTER
----- Message from Ana Lilia Kearney sent at 12/9/2022  7:47 AM CST -----  Contact: [phone]  Pt would like to consult with a nurse In regards to her xray. Please call her back at [phone]. Thanks KB

## 2023-01-12 ENCOUNTER — OFFICE VISIT (OUTPATIENT)
Dept: FAMILY MEDICINE | Facility: CLINIC | Age: 83
End: 2023-01-12
Payer: MEDICARE

## 2023-01-12 VITALS
SYSTOLIC BLOOD PRESSURE: 116 MMHG | OXYGEN SATURATION: 100 % | WEIGHT: 143.19 LBS | RESPIRATION RATE: 18 BRPM | HEIGHT: 66 IN | HEART RATE: 58 BPM | BODY MASS INDEX: 23.01 KG/M2 | DIASTOLIC BLOOD PRESSURE: 57 MMHG | TEMPERATURE: 98 F

## 2023-01-12 DIAGNOSIS — N30.00 ACUTE CYSTITIS WITHOUT HEMATURIA: Primary | ICD-10-CM

## 2023-01-12 DIAGNOSIS — R30.0 DYSURIA: ICD-10-CM

## 2023-01-12 DIAGNOSIS — E78.5 HYPERLIPIDEMIA, UNSPECIFIED HYPERLIPIDEMIA TYPE: ICD-10-CM

## 2023-01-12 DIAGNOSIS — E78.5 HYPERLIPIDEMIA ASSOCIATED WITH TYPE 2 DIABETES MELLITUS: ICD-10-CM

## 2023-01-12 DIAGNOSIS — E11.69 HYPERLIPIDEMIA ASSOCIATED WITH TYPE 2 DIABETES MELLITUS: ICD-10-CM

## 2023-01-12 LAB
BACTERIA #/AREA URNS HPF: ABNORMAL /HPF
BILIRUB UR QL STRIP: ABNORMAL
CLARITY UR: ABNORMAL
COLOR UR: ABNORMAL
GLUCOSE UR QL STRIP: ABNORMAL
HGB UR QL STRIP: ABNORMAL
KETONES UR QL STRIP: ABNORMAL
LEUKOCYTE ESTERASE UR QL STRIP: ABNORMAL
MICROSCOPIC COMMENT: ABNORMAL
NITRITE UR QL STRIP: ABNORMAL
PH UR STRIP: ABNORMAL [PH] (ref 5–8)
PROT UR QL STRIP: ABNORMAL
RBC #/AREA URNS HPF: 3 /HPF (ref 0–4)
SP GR UR STRIP: ABNORMAL (ref 1–1.03)
URN SPEC COLLECT METH UR: ABNORMAL
WBC #/AREA URNS HPF: >100 /HPF (ref 0–5)

## 2023-01-12 PROCEDURE — 81000 URINALYSIS NONAUTO W/SCOPE: CPT | Mod: PO | Performed by: PHYSICIAN ASSISTANT

## 2023-01-12 PROCEDURE — 99213 PR OFFICE/OUTPT VISIT, EST, LEVL III, 20-29 MIN: ICD-10-PCS | Mod: S$PBB,,, | Performed by: PHYSICIAN ASSISTANT

## 2023-01-12 PROCEDURE — 87186 SC STD MICRODIL/AGAR DIL: CPT | Performed by: PHYSICIAN ASSISTANT

## 2023-01-12 PROCEDURE — 99213 OFFICE O/P EST LOW 20 MIN: CPT | Mod: S$PBB,,, | Performed by: PHYSICIAN ASSISTANT

## 2023-01-12 PROCEDURE — 87077 CULTURE AEROBIC IDENTIFY: CPT | Performed by: PHYSICIAN ASSISTANT

## 2023-01-12 PROCEDURE — 87088 URINE BACTERIA CULTURE: CPT | Performed by: PHYSICIAN ASSISTANT

## 2023-01-12 PROCEDURE — 87086 URINE CULTURE/COLONY COUNT: CPT | Performed by: PHYSICIAN ASSISTANT

## 2023-01-12 PROCEDURE — 99999 PR PBB SHADOW E&M-EST. PATIENT-LVL V: ICD-10-PCS | Mod: PBBFAC,,, | Performed by: PHYSICIAN ASSISTANT

## 2023-01-12 PROCEDURE — 99999 PR PBB SHADOW E&M-EST. PATIENT-LVL V: CPT | Mod: PBBFAC,,, | Performed by: PHYSICIAN ASSISTANT

## 2023-01-12 PROCEDURE — 99215 OFFICE O/P EST HI 40 MIN: CPT | Mod: PBBFAC,PO | Performed by: PHYSICIAN ASSISTANT

## 2023-01-12 RX ORDER — MONTELUKAST SODIUM 4 MG/1
1 TABLET, CHEWABLE ORAL 2 TIMES DAILY
Qty: 180 TABLET | Refills: 3 | Status: SHIPPED | OUTPATIENT
Start: 2023-01-12 | End: 2024-01-18

## 2023-01-12 RX ORDER — CIPROFLOXACIN 500 MG/1
500 TABLET ORAL 2 TIMES DAILY
Qty: 14 TABLET | Refills: 0 | Status: SHIPPED | OUTPATIENT
Start: 2023-01-12 | End: 2023-01-17

## 2023-01-12 RX ORDER — METHENAMINE, SODIUM PHOSPHATE, MONOBASIC, ANHYDROUS, PHENYL SALICYLATE, METHYLENE BLUE AND HYOSCYAMINE SULFATE 118; 40.8; 36; 10; .12 MG/1; MG/1; MG/1; MG/1; MG/1
1 CAPSULE ORAL EVERY 6 HOURS PRN
Qty: 30 CAPSULE | Refills: 2 | Status: SHIPPED | OUTPATIENT
Start: 2023-01-12 | End: 2023-02-11

## 2023-01-12 NOTE — PROGRESS NOTES
Assessment/Plan:    Problem List Items Addressed This Visit          Cardiac/Vascular    Hyperlipidemia associated with type 2 diabetes mellitus    Overview     Hyperlipidemia Medications               atorvastatin (LIPITOR) 80 MG tablet Take 80 mg by mouth.    colestipoL (COLESTID) 1 gram Tab Take 1 tablet (1 g total) by mouth 2 (two) times daily.   -chronic condition. Currently stable.    -reports compliance with hyperlipidemia treatment as prescribed  -denies any known adverse effects of medications  -most recent labs listed below:  Lab Results   Component Value Date    CHOL 159 04/29/2022     Lab Results   Component Value Date    HDL 72 04/29/2022     Lab Results   Component Value Date    LDLCALC 67.4 04/29/2022     Lab Results   Component Value Date    TRIG 98 04/29/2022     Lab Results   Component Value Date    ALT 30 04/29/2022    AST 23 04/29/2022    ALKPHOS 61 04/29/2022    BILITOT 0.7 04/29/2022            Relevant Medications    colestipoL (COLESTID) 1 gram Tab     Other Visit Diagnoses       Acute cystitis without hematuria    -  Primary  -UA positive for infection  -start antibiotics as prescribed  -increase hydration with water  -follow up if no improvement or worsening of symptoms       Relevant Medications    methen-m.blue-s.phos-phsal-hyo (URO-MP) 118-10-40.8-36 mg Cap    ciprofloxacin HCl (CIPRO) 500 MG tablet    Dysuria        Relevant Orders    Urinalysis, Reflex to Urine Culture Urine, Clean Catch (Completed)            Follow up if symptoms worsen or fail to improve.    Bell Hunter PA-C  _____________________________________________________________________________________________________________________________________________________    CC: urinary symptoms     HPI: Patient is in clinic today as an established patient here for urinary symptoms. She reports dysuria and increased frequency. Symptom onset was about 1 week ago and has been unchanged since that time. Denies fever, hematuria,  nausea, vomiting, flank pain, or vaginal discharge. She has a history of recurrent UTIs, however, it has been several months since her last UTI. She denies history of pyelonephritis. She has tried Uro-MP which provides relief of symptoms. No other complaints today.     Past Medical History:   Diagnosis Date    Allergy     Anxiety     Asthma     Coronary artery disease 10/18/2018    DM (diabetes mellitus)     Essential hypertension 10/21/2020    IBS (irritable bowel syndrome)     Migraine headache     Mitral valve prolapse     Type 2 diabetes mellitus with hyperlipidemia 9/6/2016     Past Surgical History:   Procedure Laterality Date    BREAST BIOPSY      BREAST LUMPECTOMY      HYSTERECTOMY      partial    [place] COLONOSCOPY,REMV LESN,FORCEP/CAUTERY  8/23/2012          Review of patient's allergies indicates:   Allergen Reactions    Codeine      Other reaction(s): Unknown  unknown    Diclofenac Nausea And Vomiting    Doxycycline      Other reaction(s): Unknown  unknown    Iodinated contrast media Hives     Pt reports rxn to iv dye years ago,but no rxn to oral contrast    Sulfa (sulfonamide antibiotics)      Other reaction(s): Unknown  unknown     Social History     Tobacco Use    Smoking status: Never    Smokeless tobacco: Never   Substance Use Topics    Alcohol use: No    Drug use: No     Family History   Problem Relation Age of Onset    Heart disease Mother     Diabetes Brother     Heart disease Brother     Early death Brother     Breast cancer Maternal Aunt      Current Outpatient Medications on File Prior to Visit   Medication Sig Dispense Refill    ALPRAZolam (XANAX) 0.25 MG tablet TAKE 1 TABLET BY MOUTH 4 TIMES DAILY AS NEEDED 90 tablet 5    amitriptyline (ELAVIL) 50 MG tablet TAKE 1 TABLET BY MOUTH EVERY EVENING 90 tablet 0    aspirin (ECOTRIN) 81 MG EC tablet Take 81 mg by mouth once daily.      atorvastatin (LIPITOR) 80 MG tablet Take 80 mg by mouth.      blood sugar diagnostic (CONTOUR TEST STRIPS) Strp TEST  "BLOOD GLUCOSE ONCE DAILY 100 each 11    clopidogrel (PLAVIX) 75 mg tablet Take 75 mg by mouth once daily.      fluorometholone 0.1% (FML) 0.1 % DrpS Place 1 drop into both eyes 4 (four) times daily.      furosemide (LASIX) 20 MG tablet Take 20 mg by mouth daily as needed.      lancets (MICROLET LANCET) Misc TEST BLOOD GLUCOSE ONCE DAILY 100 each 3    lifitegrast (XIIDRA) 5 % Dpet Place 1 drop into both eyes 2 (two) times daily.      metFORMIN (GLUCOPHAGE) 500 MG tablet Take 1 tablet (500 mg total) by mouth 2 (two) times daily. 180 tablet 3    metoprolol succinate (TOPROL-XL) 100 MG 24 hr tablet Take 50 mg by mouth once daily.       nystatin (MYCOSTATIN) powder Apply topically 4 (four) times daily. 60 g 0    [DISCONTINUED] colestipoL (COLESTID) 1 gram Tab Take 1 g by mouth 2 (two) times daily.       No current facility-administered medications on file prior to visit.       Review of Systems   Constitutional:  Negative for chills, diaphoresis, fatigue and fever.   HENT:  Negative for congestion, ear pain, postnasal drip, sinus pain and sore throat.    Eyes:  Negative for pain and redness.   Respiratory:  Negative for cough, chest tightness and shortness of breath.    Cardiovascular:  Negative for chest pain and leg swelling.   Gastrointestinal:  Negative for abdominal pain, constipation, diarrhea, nausea and vomiting.   Genitourinary:  Positive for dysuria and frequency. Negative for hematuria.   Musculoskeletal:  Negative for arthralgias and joint swelling.   Skin:  Negative for rash.   Neurological:  Negative for dizziness, syncope and headaches.   Psychiatric/Behavioral:  Negative for dysphoric mood. The patient is not nervous/anxious.      Vitals:    01/12/23 0940   BP: (!) 116/57   Pulse: (!) 58   Resp: 18   Temp: 97.7 °F (36.5 °C)   SpO2: 100%   Weight: 64.9 kg (143 lb 3 oz)   Height: 5' 6" (1.676 m)       Wt Readings from Last 3 Encounters:   01/12/23 64.9 kg (143 lb 3 oz)   12/02/22 65 kg (143 lb 4.8 oz) "   11/02/22 67.4 kg (148 lb 11.2 oz)       Physical Exam  Constitutional:       General: She is not in acute distress.     Appearance: Normal appearance. She is well-developed.   HENT:      Head: Normocephalic and atraumatic.   Eyes:      Conjunctiva/sclera: Conjunctivae normal.   Cardiovascular:      Rate and Rhythm: Normal rate and regular rhythm.      Pulses: Normal pulses.      Heart sounds: Normal heart sounds. No murmur heard.  Pulmonary:      Effort: Pulmonary effort is normal. No respiratory distress.      Breath sounds: Normal breath sounds.   Abdominal:      General: Bowel sounds are normal. There is no distension.      Palpations: Abdomen is soft.      Tenderness: There is no abdominal tenderness. There is no right CVA tenderness or left CVA tenderness.   Musculoskeletal:         General: Normal range of motion.      Cervical back: Normal range of motion and neck supple.   Skin:     General: Skin is warm and dry.      Findings: No rash.   Neurological:      General: No focal deficit present.      Mental Status: She is alert and oriented to person, place, and time.   Psychiatric:         Mood and Affect: Mood normal.         Behavior: Behavior normal.       Health Maintenance   Topic Date Due    TETANUS VACCINE  Never done    Lipid Panel  04/29/2023    Hemoglobin A1c  05/02/2023    Eye Exam  09/30/2023    DEXA Scan  10/30/2023    Aspirin/Antiplatelet Therapy  01/12/2024

## 2023-01-14 LAB — BACTERIA UR CULT: ABNORMAL

## 2023-01-15 NOTE — TELEPHONE ENCOUNTER
No new care gaps identified.  [facility] Embedded Care Gaps. Reference number: [id number]. 1/15/2023   8:19:47 AM CST

## 2023-01-16 RX ORDER — AMITRIPTYLINE HYDROCHLORIDE 50 MG/1
TABLET, FILM COATED ORAL
Qty: 90 TABLET | Refills: 3 | Status: SHIPPED | OUTPATIENT
Start: 2023-01-16 | End: 2024-01-09

## 2023-01-16 NOTE — TELEPHONE ENCOUNTER
Refill Decision Note   Alon Chatman  is requesting a refill authorization.  Brief Assessment and Rationale for Refill:  Approve     Medication Therapy Plan:       Medication Reconciliation Completed: No   Comments:     No Care Gaps recommended.     Note composed:2:32 PM 01/16/2023

## 2023-01-17 DIAGNOSIS — N30.00 ACUTE CYSTITIS WITHOUT HEMATURIA: Primary | ICD-10-CM

## 2023-01-17 RX ORDER — NITROFURANTOIN 25; 75 MG/1; MG/1
100 CAPSULE ORAL 2 TIMES DAILY
Qty: 10 CAPSULE | Refills: 0 | Status: SHIPPED | OUTPATIENT
Start: 2023-01-17 | End: 2023-01-22

## 2023-01-17 NOTE — PROGRESS NOTES
Results have been released via [employer]. Please verify that these have been viewed by patient. If not, please call patient with results.     I have sent a message to them with the following interpretation (see below).    I have reviewed your recent urine culture.     Bacteria is resistant to the antibiotic prescribed. Please stop the antibiotic that you are taking and I will send in another (Macrobid). Please let me know if symptoms worsen or do not improve.     Please do not hesitate to call or message with any additional questions or concerns.    Bell Hunter PA-C

## 2023-01-26 ENCOUNTER — TELEPHONE (OUTPATIENT)
Dept: FAMILY MEDICINE | Facility: CLINIC | Age: 83
End: 2023-01-26
Payer: MEDICARE

## 2023-01-26 DIAGNOSIS — Z12.31 ENCOUNTER FOR SCREENING MAMMOGRAM FOR BREAST CANCER: Primary | ICD-10-CM

## 2023-01-26 NOTE — TELEPHONE ENCOUNTER
----- Message from Earlene Irving sent at 1/26/2023 11:51 AM CST -----  Contact: Alon  Patient is calling to speak with the nurse in regards to orders. Reports needing orders put in for mammogram to schedule. Please give patient a callback at [phone].

## 2023-02-03 DIAGNOSIS — E11.69 TYPE 2 DIABETES MELLITUS WITH HYPERLIPIDEMIA: Primary | ICD-10-CM

## 2023-02-03 DIAGNOSIS — E78.5 TYPE 2 DIABETES MELLITUS WITH HYPERLIPIDEMIA: Primary | ICD-10-CM

## 2023-02-03 RX ORDER — DAPAGLIFLOZIN 5 MG/1
5 TABLET, FILM COATED ORAL DAILY
Qty: 90 TABLET | Refills: 3 | Status: SHIPPED | OUTPATIENT
Start: 2023-02-03 | End: 2023-03-29

## 2023-02-06 ENCOUNTER — TELEPHONE (OUTPATIENT)
Dept: FAMILY MEDICINE | Facility: CLINIC | Age: 83
End: 2023-02-06
Payer: MEDICARE

## 2023-02-07 ENCOUNTER — HOSPITAL ENCOUNTER (OUTPATIENT)
Dept: RADIOLOGY | Facility: HOSPITAL | Age: 83
Discharge: HOME | End: 2023-02-07
Attending: PHYSICIAN ASSISTANT
Payer: MEDICARE

## 2023-02-07 VITALS — WEIGHT: 143 LBS | HEIGHT: 66 IN | BODY MASS INDEX: 22.98 KG/M2

## 2023-02-07 DIAGNOSIS — Z12.31 ENCOUNTER FOR SCREENING MAMMOGRAM FOR BREAST CANCER: ICD-10-CM

## 2023-02-07 PROCEDURE — 77063 BREAST TOMOSYNTHESIS BI: CPT | Mod: 26,,, | Performed by: RADIOLOGY

## 2023-02-07 PROCEDURE — 77067 SCR MAMMO BI INCL CAD: CPT | Mod: 26,,, | Performed by: RADIOLOGY

## 2023-02-07 PROCEDURE — 77067 MAMMO DIGITAL SCREENING BILAT WITH TOMO: ICD-10-PCS | Mod: 26,,, | Performed by: RADIOLOGY

## 2023-02-07 PROCEDURE — 77067 SCR MAMMO BI INCL CAD: CPT | Mod: TC,PO

## 2023-02-07 PROCEDURE — 77063 MAMMO DIGITAL SCREENING BILAT WITH TOMO: ICD-10-PCS | Mod: 26,,, | Performed by: RADIOLOGY

## 2023-02-08 NOTE — PROGRESS NOTES
Normal mammogram, repeat in 1 year, results released through [employer]. Please verify that patient has viewed results. If not, please call patient with interpretation below:     I have reviewed the results of your mammogram and it appears that everything was read as normal.  Based on this, the radiologist has recommended that you recheck a mammogram in 1 year.

## 2023-02-10 ENCOUNTER — TELEPHONE (OUTPATIENT)
Dept: FAMILY MEDICINE | Facility: CLINIC | Age: 83
End: 2023-02-10
Payer: MEDICARE

## 2023-02-10 ENCOUNTER — LAB VISIT (OUTPATIENT)
Dept: LAB | Facility: HOSPITAL | Age: 83
End: 2023-02-10
Attending: PHYSICIAN ASSISTANT
Payer: MEDICARE

## 2023-02-10 DIAGNOSIS — N30.00 ACUTE CYSTITIS WITHOUT HEMATURIA: Primary | ICD-10-CM

## 2023-02-10 DIAGNOSIS — R30.0 DYSURIA: ICD-10-CM

## 2023-02-10 DIAGNOSIS — R30.0 DYSURIA: Primary | ICD-10-CM

## 2023-02-10 LAB
BACTERIA #/AREA URNS HPF: ABNORMAL /HPF
BILIRUB UR QL STRIP: NEGATIVE
CLARITY UR: ABNORMAL
COLOR UR: YELLOW
GLUCOSE UR QL STRIP: ABNORMAL
HGB UR QL STRIP: ABNORMAL
KETONES UR QL STRIP: NEGATIVE
LEUKOCYTE ESTERASE UR QL STRIP: ABNORMAL
MICROSCOPIC COMMENT: ABNORMAL
NITRITE UR QL STRIP: POSITIVE
NON-SQ EPI CELLS #/AREA URNS HPF: 1 /HPF
PH UR STRIP: 5.5 [PH] (ref 5–8)
PROT UR QL STRIP: NEGATIVE
RBC #/AREA URNS HPF: 2 /HPF (ref 0–4)
SP GR UR STRIP: 1.01 (ref 1–1.03)
SQUAMOUS #/AREA URNS HPF: 1 /HPF
URN SPEC COLLECT METH UR: ABNORMAL
WBC #/AREA URNS HPF: >100 /HPF (ref 0–5)
WBC CLUMPS URNS QL MICRO: ABNORMAL
YEAST URNS QL MICRO: ABNORMAL

## 2023-02-10 PROCEDURE — 87086 URINE CULTURE/COLONY COUNT: CPT | Performed by: PHYSICIAN ASSISTANT

## 2023-02-10 PROCEDURE — 87186 SC STD MICRODIL/AGAR DIL: CPT | Performed by: PHYSICIAN ASSISTANT

## 2023-02-10 PROCEDURE — 87077 CULTURE AEROBIC IDENTIFY: CPT | Performed by: PHYSICIAN ASSISTANT

## 2023-02-10 PROCEDURE — 81000 URINALYSIS NONAUTO W/SCOPE: CPT | Mod: PO | Performed by: PHYSICIAN ASSISTANT

## 2023-02-10 PROCEDURE — 87088 URINE BACTERIA CULTURE: CPT | Performed by: PHYSICIAN ASSISTANT

## 2023-02-10 RX ORDER — NITROFURANTOIN 25; 75 MG/1; MG/1
100 CAPSULE ORAL 2 TIMES DAILY
Qty: 10 CAPSULE | Refills: 0 | Status: SHIPPED | OUTPATIENT
Start: 2023-02-10 | End: 2023-02-15

## 2023-02-10 NOTE — TELEPHONE ENCOUNTER
----- Message from Tahira Kilpatrick sent at 2/10/2023  7:23 AM CST -----  Contact: michael  Patient is calling to speak with the nurse regarding appointment. Reports her bladder infection has came back and needs to come in the office. Please give the patient a call back at [phone]  Thanks mireya

## 2023-02-10 NOTE — TELEPHONE ENCOUNTER
She does not need an apt, but please have her come in for a UA so we can at least send it in for a culture to make sure there is no antibiotic resistance.     I have signed for the following orders AND/OR meds.  Please call the patient and ask the patient to schedule the testing AND/OR inform about any medications that were sent. Medications have been sent to pharmacy listed below      Orders Placed This Encounter   Procedures    Urinalysis, Reflex to Urine Culture Urine, Clean Catch     Standing Status:   Future     Standing Expiration Date:   4/10/2024     Order Specific Question:   Preferred Collection Type     Answer:   Urine, Clean Catch     Order Specific Question:   Specimen Source     Answer:   Urine              [id number]  [place] - [address] [facility]  [address] [place] [address]  Phone: [phone] Fax: [phone]    [id number] - [place] - [address]   [address] [facility] [place] [address]  Phone: [phone] Fax: [phone]

## 2023-02-10 NOTE — TELEPHONE ENCOUNTER
Patient states she was recently seen for a bladder infection and Macrobid treated her infection. But she is now starting to have symptoms again. She states that in the past she has had to take 2 doses of macrobid for her infection. Patient would like another round of ABX instead of coming in. Please advise.

## 2023-02-10 NOTE — PROGRESS NOTES
Results have been released via [email or web address]. Please verify that these have been viewed by patient. If not, please call patient with results.     I have sent a message to them with the following interpretation (see below).    I have reviewed your recent urine study which showed bacteria concerning for infection. I have sent in another round of antibiotics to your pharmacy for UTI.     Please do not hesitate to call or message with any additional questions or concerns.    Bell Hunter PA-C

## 2023-02-12 LAB — BACTERIA UR CULT: ABNORMAL

## 2023-02-13 NOTE — PROGRESS NOTES
Results have been released via [employer]. Please verify that these have been viewed by patient. If not, please call patient with results.     I have sent a message to them with the following interpretation (see below).    I have reviewed your recent urine culture.    Bacteria is sensitive to the antibiotic prescribed. Please complete the medication and follow up if symptoms persist.    Please do not hesitate to call or message with any additional questions or concerns.    Bell Hunter PA-C

## 2023-03-29 ENCOUNTER — OFFICE VISIT (OUTPATIENT)
Dept: FAMILY MEDICINE | Facility: CLINIC | Age: 83
End: 2023-03-29
Payer: MEDICARE

## 2023-03-29 ENCOUNTER — LAB VISIT (OUTPATIENT)
Dept: LAB | Facility: HOSPITAL | Age: 83
End: 2023-03-29
Attending: PHYSICIAN ASSISTANT
Payer: MEDICARE

## 2023-03-29 VITALS
RESPIRATION RATE: 18 BRPM | TEMPERATURE: 98 F | SYSTOLIC BLOOD PRESSURE: 112 MMHG | BODY MASS INDEX: 24.22 KG/M2 | WEIGHT: 150.69 LBS | DIASTOLIC BLOOD PRESSURE: 56 MMHG | HEIGHT: 66 IN | HEART RATE: 59 BPM

## 2023-03-29 DIAGNOSIS — R25.1 TREMOR OF LEFT HAND: ICD-10-CM

## 2023-03-29 DIAGNOSIS — E78.5 TYPE 2 DIABETES MELLITUS WITH HYPERLIPIDEMIA: ICD-10-CM

## 2023-03-29 DIAGNOSIS — R25.1 TREMOR OF LEFT HAND: Primary | ICD-10-CM

## 2023-03-29 DIAGNOSIS — E11.69 TYPE 2 DIABETES MELLITUS WITH HYPERLIPIDEMIA: ICD-10-CM

## 2023-03-29 LAB
ALBUMIN SERPL BCP-MCNC: 3.7 G/DL (ref 3.5–5.2)
ALP SERPL-CCNC: 69 U/L (ref 55–135)
ALT SERPL W/O P-5'-P-CCNC: 9 U/L (ref 10–44)
ANION GAP SERPL CALC-SCNC: 12 MMOL/L (ref 8–16)
AST SERPL-CCNC: 16 U/L (ref 10–40)
BASOPHILS # BLD AUTO: 0.03 K/UL (ref 0–0.2)
BASOPHILS NFR BLD: 0.4 % (ref 0–1.9)
BILIRUB SERPL-MCNC: 0.3 MG/DL (ref 0.1–1)
BUN SERPL-MCNC: 15 MG/DL (ref 8–23)
CALCIUM SERPL-MCNC: 9.8 MG/DL (ref 8.7–10.5)
CHLORIDE SERPL-SCNC: 103 MMOL/L (ref 95–110)
CO2 SERPL-SCNC: 26 MMOL/L (ref 23–29)
CREAT SERPL-MCNC: 1 MG/DL (ref 0.5–1.4)
DIFFERENTIAL METHOD: ABNORMAL
EOSINOPHIL # BLD AUTO: 0.3 K/UL (ref 0–0.5)
EOSINOPHIL NFR BLD: 4.2 % (ref 0–8)
ERYTHROCYTE [DISTWIDTH] IN BLOOD BY AUTOMATED COUNT: 12.4 % (ref 11.5–14.5)
EST. GFR  (NO RACE VARIABLE): 56.6 ML/MIN/1.73 M^2
GLUCOSE SERPL-MCNC: 100 MG/DL (ref 70–110)
HCT VFR BLD AUTO: 37.2 % (ref 37–48.5)
HGB BLD-MCNC: 11.8 G/DL (ref 12–16)
IMM GRANULOCYTES # BLD AUTO: 0.01 K/UL (ref 0–0.04)
IMM GRANULOCYTES NFR BLD AUTO: 0.1 % (ref 0–0.5)
LYMPHOCYTES # BLD AUTO: 1.8 K/UL (ref 1–4.8)
LYMPHOCYTES NFR BLD: 24.2 % (ref 18–48)
MCH RBC QN AUTO: 30 PG (ref 27–31)
MCHC RBC AUTO-ENTMCNC: 31.7 G/DL (ref 32–36)
MCV RBC AUTO: 95 FL (ref 82–98)
MONOCYTES # BLD AUTO: 0.7 K/UL (ref 0.3–1)
MONOCYTES NFR BLD: 9.9 % (ref 4–15)
NEUTROPHILS # BLD AUTO: 4.5 K/UL (ref 1.8–7.7)
NEUTROPHILS NFR BLD: 61.2 % (ref 38–73)
NRBC BLD-RTO: 0 /100 WBC
PLATELET # BLD AUTO: 199 K/UL (ref 150–450)
PMV BLD AUTO: 11.3 FL (ref 9.2–12.9)
POTASSIUM SERPL-SCNC: 4.6 MMOL/L (ref 3.5–5.1)
PROT SERPL-MCNC: 7 G/DL (ref 6–8.4)
RBC # BLD AUTO: 3.93 M/UL (ref 4–5.4)
SODIUM SERPL-SCNC: 141 MMOL/L (ref 136–145)
WBC # BLD AUTO: 7.36 K/UL (ref 3.9–12.7)

## 2023-03-29 PROCEDURE — 84443 ASSAY THYROID STIM HORMONE: CPT | Performed by: PHYSICIAN ASSISTANT

## 2023-03-29 PROCEDURE — 99215 OFFICE O/P EST HI 40 MIN: CPT | Mod: PBBFAC,PO | Performed by: PHYSICIAN ASSISTANT

## 2023-03-29 PROCEDURE — 99999 PR PBB SHADOW E&M-EST. PATIENT-LVL V: ICD-10-PCS | Mod: PBBFAC,,, | Performed by: PHYSICIAN ASSISTANT

## 2023-03-29 PROCEDURE — 80053 COMPREHEN METABOLIC PANEL: CPT | Performed by: PHYSICIAN ASSISTANT

## 2023-03-29 PROCEDURE — 99214 PR OFFICE/OUTPT VISIT, EST, LEVL IV, 30-39 MIN: ICD-10-PCS | Mod: S$PBB,,, | Performed by: PHYSICIAN ASSISTANT

## 2023-03-29 PROCEDURE — 99214 OFFICE O/P EST MOD 30 MIN: CPT | Mod: S$PBB,,, | Performed by: PHYSICIAN ASSISTANT

## 2023-03-29 PROCEDURE — 36415 COLL VENOUS BLD VENIPUNCTURE: CPT | Mod: PO | Performed by: PHYSICIAN ASSISTANT

## 2023-03-29 PROCEDURE — 99999 PR PBB SHADOW E&M-EST. PATIENT-LVL V: CPT | Mod: PBBFAC,,, | Performed by: PHYSICIAN ASSISTANT

## 2023-03-29 PROCEDURE — 85025 COMPLETE CBC W/AUTO DIFF WBC: CPT | Performed by: PHYSICIAN ASSISTANT

## 2023-03-29 NOTE — PROGRESS NOTES
Assessment/Plan:    Problem List Items Addressed This Visit          Endocrine    Type 2 diabetes mellitus with hyperlipidemia    Overview     Diabetes Medications               metFORMIN (GLUCOPHAGE) 500 MG tablet Take 1 tablet (500 mg total) by mouth 2 (two) times daily.   -condition is currently controlled  -recently started Farxiga for proteinuria, had AE of vaginal candidiasis  -plan to stop the medication, will continue to monitor    -see diabetic health maintenance listed below  -on statin: Yes  -on ACE-I/ARB: No  -counseling provided on importance of diabetic diet and medication compliance in order to treat diabetes  -discussed diabetes disease course and potential complications            Other Visit Diagnoses       Tremor of left hand    -  Primary  -new onset L hand tremor, worsening  -will check labs today  -refer to neurology for further evaluation and treatment      Relevant Orders    CBC Auto Differential    Comprehensive Metabolic Panel    TSH    Ambulatory referral/consult to Neurology            Follow up in 3 months (on 6/29/2023), or if symptoms worsen or fail to improve.    Bell Hunter PA-C  _____________________________________________________________________________________________________________________________________________________    CC: L hand tremor    HPI: Patient is in clinic today as an established patient here for L hand tremor. Symptom onset was a few months ago and has worsened since that time. She stated that she initially noticed it in her L thumb and now it is occurring in her whole L hand. She stated that the tremor comes and goes and occurs both as rest and with activity. She stated that symptoms are bothersome. She also reports difficulty with gait and balance, however, she stated that this is chronic and ongoing. She denies voice change, sleep disturbance, rigidity, postural changes, difficulty in initiating movement, change in handwriting, difficult with posture, and  difficulty with swallowing. No other complaints today.     Past Medical History:   Diagnosis Date    Allergy     Anxiety     Asthma     Coronary artery disease 10/18/2018    DM (diabetes mellitus)     Essential hypertension 10/21/2020    IBS (irritable bowel syndrome)     Migraine headache     Mitral valve prolapse     Type 2 diabetes mellitus with hyperlipidemia 9/6/2016     Past Surgical History:   Procedure Laterality Date    BREAST BIOPSY      BREAST LUMPECTOMY      HYSTERECTOMY      partial    MA COLONOSCOPY,[facility]ENSON,FORCEP/CAUTERY  8/23/2012          Review of patient's allergies indicates:   Allergen Reactions    Codeine      Other reaction(s): Unknown  unknown    Diclofenac Nausea And Vomiting    Doxycycline      Other reaction(s): Unknown  unknown    Iodinated contrast media Hives     Pt reports rxn to iv dye years ago,but no rxn to oral contrast    Sulfa (sulfonamide antibiotics)      Other reaction(s): Unknown  unknown     Social History     Tobacco Use    Smoking status: Never    Smokeless tobacco: Never   Substance Use Topics    Alcohol use: No    Drug use: No     Family History   Problem Relation Age of Onset    Heart disease Mother     Diabetes Brother     Heart disease Brother     Early death Brother     Breast cancer Maternal Aunt      Current Outpatient Medications on File Prior to Visit   Medication Sig Dispense Refill    ALPRAZolam (XANAX) 0.25 MG tablet TAKE 1 TABLET BY MOUTH 4 TIMES DAILY AS NEEDED 90 tablet 5    amitriptyline (ELAVIL) 50 MG tablet TAKE 1 TABLET BY MOUTH EVERY EVENING 90 tablet 3    aspirin (ECOTRIN) 81 MG EC tablet Take 81 mg by mouth once daily.      atorvastatin (LIPITOR) 80 MG tablet Take 80 mg by mouth.      blood sugar diagnostic (CONTOUR TEST STRIPS) Strp TEST BLOOD GLUCOSE ONCE DAILY 100 each 11    clopidogrel (PLAVIX) 75 mg tablet Take 75 mg by mouth once daily.      colestipoL (COLESTID) 1 gram Tab Take 1 tablet (1 g total) by mouth 2 (two) times daily. 180 tablet 3  "   fluorometholone 0.1% (FML) 0.1 % DrpS Place 1 drop into both eyes 4 (four) times daily.      furosemide (LASIX) 20 MG tablet Take 20 mg by mouth daily as needed.      lancets (MICROLET LANCET) Misc TEST BLOOD GLUCOSE ONCE DAILY 100 each 3    lifitegrast (XIIDRA) 5 % Dpet Place 1 drop into both eyes 2 (two) times daily.      metFORMIN (GLUCOPHAGE) 500 MG tablet Take 1 tablet (500 mg total) by mouth 2 (two) times daily. 180 tablet 3    metoprolol succinate (TOPROL-XL) 100 MG 24 hr tablet Take 50 mg by mouth once daily.       nystatin (MYCOSTATIN) powder Apply topically 4 (four) times daily. 60 g 0    URO--10-40.8-36 mg Cap TAKE 1 CAPSULE BY MOUTH EVERY 6 HOURS AS NEEDED FOR URINARY SYMPTOMS 30 capsule 0    [DISCONTINUED] dapagliflozin (FARXIGA) 5 mg Tab tablet Take 1 tablet (5 mg total) by mouth once daily. (Patient not taking: Reported on 3/29/2023) 90 tablet 3     No current facility-administered medications on file prior to visit.       Review of Systems   Constitutional:  Negative for chills, diaphoresis, fatigue and fever.   HENT:  Negative for congestion, ear pain, postnasal drip, sinus pain and sore throat.    Eyes:  Negative for pain and redness.   Respiratory:  Negative for cough, chest tightness and shortness of breath.    Cardiovascular:  Negative for chest pain and leg swelling.   Gastrointestinal:  Negative for abdominal pain, constipation, diarrhea, nausea and vomiting.   Genitourinary:  Negative for dysuria and hematuria.   Musculoskeletal:  Negative for arthralgias and joint swelling.   Skin:  Negative for rash.   Neurological:  Positive for tremors. Negative for dizziness, syncope and headaches.   Psychiatric/Behavioral:  Negative for dysphoric mood. The patient is not nervous/anxious.      Vitals:    03/29/23 1321   BP: (!) 112/56   BP Location: Left arm   Patient Position: Sitting   Pulse: (!) 59   Resp: 18   Temp: 98 °F (36.7 °C)   Weight: 68.4 kg (150 lb 11.2 oz)   Height: 5' 6" (1.676 m) "       Wt Readings from Last 3 Encounters:   03/29/23 68.4 kg (150 lb 11.2 oz)   02/07/23 64.9 kg (143 lb)   01/12/23 64.9 kg (143 lb 3 oz)       Physical Exam  Constitutional:       General: She is not in acute distress.     Appearance: Normal appearance. She is well-developed.   HENT:      Head: Normocephalic and atraumatic.   Eyes:      Conjunctiva/sclera: Conjunctivae normal.   Cardiovascular:      Rate and Rhythm: Normal rate and regular rhythm.      Pulses: Normal pulses.      Heart sounds: Normal heart sounds. No murmur heard.  Pulmonary:      Effort: Pulmonary effort is normal. No respiratory distress.      Breath sounds: Normal breath sounds.   Abdominal:      General: Bowel sounds are normal. There is no distension.      Palpations: Abdomen is soft.      Tenderness: There is no abdominal tenderness.   Musculoskeletal:         General: Normal range of motion.      Cervical back: Normal range of motion and neck supple.   Skin:     General: Skin is warm and dry.      Findings: No rash.   Neurological:      General: No focal deficit present.      Mental Status: She is alert and oriented to person, place, and time.      Cranial Nerves: No cranial nerve deficit.      Sensory: No sensory deficit.      Motor: No weakness.      Coordination: Coordination normal.      Gait: Gait normal.      Deep Tendon Reflexes: Reflexes normal.   Psychiatric:         Mood and Affect: Mood normal.         Behavior: Behavior normal.       Health Maintenance   Topic Date Due    TETANUS VACCINE  Never done    Lipid Panel  04/29/2023    Hemoglobin A1c  05/02/2023    Eye Exam  09/30/2023    DEXA Scan  10/30/2023    Aspirin/Antiplatelet Therapy  01/12/2024

## 2023-03-30 LAB — TSH SERPL DL<=0.005 MIU/L-ACNC: 3.36 UIU/ML (ref 0.4–4)

## 2023-03-30 NOTE — PROGRESS NOTES
Results have been released via [facility]. Please verify that these have been viewed by patient. If not, please call patient with results.     I have sent a message to them with the following interpretation (see below).    I have reviewed your recent blood work.     CBC NORMAL-The CBC appears to be stable at this time with no sign of major anemia, abnormal white count or platelet abnormality.  CMP NORMAL-The electrolytes all appear stable at this time. Kidney function was slightly decreased. The liver enzymes were noted to be stable also.  TSH NORMAL-The TSH screening indicated a normal function of the thyroid.    Please do not hesitate to call or message with any additional questions or concerns.    Bell Hunter PA-C

## 2023-04-14 ENCOUNTER — OFFICE VISIT (OUTPATIENT)
Dept: NEUROLOGY | Facility: CLINIC | Age: 83
End: 2023-04-14
Payer: MEDICARE

## 2023-04-14 VITALS
BODY MASS INDEX: 23.64 KG/M2 | DIASTOLIC BLOOD PRESSURE: 75 MMHG | SYSTOLIC BLOOD PRESSURE: 130 MMHG | WEIGHT: 147.06 LBS | HEIGHT: 66 IN | HEART RATE: 64 BPM

## 2023-04-14 DIAGNOSIS — R25.1 TREMOR: ICD-10-CM

## 2023-04-14 DIAGNOSIS — G20.A1 IDIOPATHIC PARKINSON'S DISEASE: Primary | ICD-10-CM

## 2023-04-14 DIAGNOSIS — K58.0 IRRITABLE BOWEL SYNDROME WITH DIARRHEA: ICD-10-CM

## 2023-04-14 DIAGNOSIS — R00.2 PALPITATIONS: ICD-10-CM

## 2023-04-14 DIAGNOSIS — I25.10 CORONARY ARTERY DISEASE INVOLVING NATIVE CORONARY ARTERY OF NATIVE HEART WITHOUT ANGINA PECTORIS: ICD-10-CM

## 2023-04-14 DIAGNOSIS — E78.5 TYPE 2 DIABETES MELLITUS WITH HYPERLIPIDEMIA: ICD-10-CM

## 2023-04-14 DIAGNOSIS — E11.69 TYPE 2 DIABETES MELLITUS WITH HYPERLIPIDEMIA: ICD-10-CM

## 2023-04-14 DIAGNOSIS — G43.009 MIGRAINE WITHOUT AURA AND WITHOUT STATUS MIGRAINOSUS, NOT INTRACTABLE: ICD-10-CM

## 2023-04-14 DIAGNOSIS — E11.69 HYPERLIPIDEMIA ASSOCIATED WITH TYPE 2 DIABETES MELLITUS: ICD-10-CM

## 2023-04-14 DIAGNOSIS — I50.20 HFREF (HEART FAILURE WITH REDUCED EJECTION FRACTION): ICD-10-CM

## 2023-04-14 DIAGNOSIS — E78.5 DYSLIPIDEMIA: ICD-10-CM

## 2023-04-14 DIAGNOSIS — E78.5 HYPERLIPIDEMIA ASSOCIATED WITH TYPE 2 DIABETES MELLITUS: ICD-10-CM

## 2023-04-14 DIAGNOSIS — N30.20 CHRONIC CYSTITIS: ICD-10-CM

## 2023-04-14 DIAGNOSIS — I10 ESSENTIAL HYPERTENSION: ICD-10-CM

## 2023-04-14 DIAGNOSIS — F41.9 ANXIETY: ICD-10-CM

## 2023-04-14 DIAGNOSIS — R25.1 TREMOR OF LEFT HAND: ICD-10-CM

## 2023-04-14 DIAGNOSIS — M85.89 OSTEOPENIA OF MULTIPLE SITES: ICD-10-CM

## 2023-04-14 PROBLEM — M70.62 TROCHANTERIC BURSITIS OF LEFT HIP: Status: RESOLVED | Noted: 2020-02-20 | Resolved: 2023-04-14

## 2023-04-14 PROCEDURE — 99205 PR OFFICE/OUTPT VISIT, NEW, LEVL V, 60-74 MIN: ICD-10-PCS | Mod: S$PBB,,, | Performed by: PSYCHIATRY & NEUROLOGY

## 2023-04-14 PROCEDURE — 99205 OFFICE O/P NEW HI 60 MIN: CPT | Mod: S$PBB,,, | Performed by: PSYCHIATRY & NEUROLOGY

## 2023-04-14 PROCEDURE — 99999 PR PBB SHADOW E&M-EST. PATIENT-LVL V: ICD-10-PCS | Mod: PBBFAC,,, | Performed by: PSYCHIATRY & NEUROLOGY

## 2023-04-14 PROCEDURE — 99215 OFFICE O/P EST HI 40 MIN: CPT | Mod: PBBFAC | Performed by: PSYCHIATRY & NEUROLOGY

## 2023-04-14 PROCEDURE — 99999 PR PBB SHADOW E&M-EST. PATIENT-LVL V: CPT | Mod: PBBFAC,,, | Performed by: PSYCHIATRY & NEUROLOGY

## 2023-04-14 RX ORDER — CARBIDOPA AND LEVODOPA 25; 100 MG/1; MG/1
1 TABLET ORAL 4 TIMES DAILY
Qty: 360 TABLET | Refills: 3 | Status: SHIPPED | OUTPATIENT
Start: 2023-04-14 | End: 2023-06-21

## 2023-04-14 NOTE — PROGRESS NOTES
Subjective:       Patient ID: Alon Chatman is a 82 y.o. female.    Chief Complaint: Tremor of left hand           HPI    The patient started having tremors in 2022.  The patient's tremors are strictly in the LUE and LLE. No head tremors. No voice tremors. The tremors seem to be mainly at rest and during activity. The patient's gait slowed down. Postural stability is impaired alongside with occasional mild postural lightheadedness. The patient also complains of muscle stiffness. No hallucinations. No delusions. No alteration of mental status.  No language or communication problems. No muscle weakness or twitching. No trouble swallowing. No eye movement difficulty. The patient's voice tone and volume have decreased. In addition, the facial expressions have declined. The handwriting has gotten smaller. No history of TBI. No history of urine incontinence. No history of memory loss. No  drooling and no trouble swallowing. The patient reports reactive depression. Denies spells of sudden laughing but they seem to be unprovoked. No suicidal or homicidal ideations. No antipsychotic or antiemetic meds use for prolonged time. No history of CO poisoning. No family history of similar symptoms. The patient denies history of strokes. No history of occupational toxic exposure. No history of chronic liver disease.      Review of Systems   Constitutional:  Negative for appetite change and fatigue.   HENT:  Negative for hearing loss and tinnitus.    Eyes:  Negative for photophobia and visual disturbance.   Respiratory:  Negative for apnea and shortness of breath.    Cardiovascular:  Negative for chest pain and palpitations.   Gastrointestinal:  Negative for nausea and vomiting.   Endocrine: Negative for cold intolerance and heat intolerance.   Genitourinary:  Negative for difficulty urinating and urgency.   Musculoskeletal:  Positive for gait problem. Negative for arthralgias, back pain, joint swelling, myalgias, neck pain and  neck stiffness.   Skin:  Negative for color change and rash.   Allergic/Immunologic: Negative for environmental allergies and immunocompromised state.   Neurological:  Positive for tremors and speech difficulty. Negative for dizziness, seizures, syncope, facial asymmetry, weakness, light-headedness, numbness and headaches.   Hematological:  Negative for adenopathy. Does not bruise/bleed easily.   Psychiatric/Behavioral:  Negative for agitation, behavioral problems, confusion, decreased concentration, dysphoric mood, hallucinations, self-injury, sleep disturbance and suicidal ideas. The patient is nervous/anxious. The patient is not hyperactive.                Current Outpatient Medications:     ALPRAZolam (XANAX) 0.25 MG tablet, TAKE 1 TABLET BY MOUTH 4 TIMES DAILY AS NEEDED, Disp: 90 tablet, Rfl: 5    amitriptyline (ELAVIL) 50 MG tablet, TAKE 1 TABLET BY MOUTH EVERY EVENING, Disp: 90 tablet, Rfl: 3    aspirin (ECOTRIN) 81 MG EC tablet, Take 81 mg by mouth once daily., Disp: , Rfl:     atorvastatin (LIPITOR) 80 MG tablet, Take 80 mg by mouth., Disp: , Rfl:     blood sugar diagnostic (CONTOUR TEST STRIPS) Strp, TEST BLOOD GLUCOSE ONCE DAILY, Disp: 100 each, Rfl: 11    clopidogrel (PLAVIX) 75 mg tablet, Take 75 mg by mouth once daily., Disp: , Rfl:     colestipoL (COLESTID) 1 gram Tab, Take 1 tablet (1 g total) by mouth 2 (two) times daily., Disp: 180 tablet, Rfl: 3    fluorometholone 0.1% (FML) 0.1 % DrpS, Place 1 drop into both eyes 4 (four) times daily., Disp: , Rfl:     furosemide (LASIX) 20 MG tablet, Take 20 mg by mouth daily as needed., Disp: , Rfl:     lancets (MICROLET LANCET) Misc, TEST BLOOD GLUCOSE ONCE DAILY, Disp: 100 each, Rfl: 3    lifitegrast (XIIDRA) 5 % Dpet, Place 1 drop into both eyes 2 (two) times daily., Disp: , Rfl:     metFORMIN (GLUCOPHAGE) 500 MG tablet, Take 1 tablet (500 mg total) by mouth 2 (two) times daily., Disp: 180 tablet, Rfl: 3    metoprolol succinate (TOPROL-XL) 100 MG 24 hr  tablet, Take 50 mg by mouth once daily. , Disp: , Rfl:     nystatin (MYCOSTATIN) powder, Apply topically 4 (four) times daily., Disp: 60 g, Rfl: 0    URO--10-40.8-36 mg Cap, TAKE 1 CAPSULE BY MOUTH EVERY 6 HOURS AS NEEDED FOR URINARY SYMPTOMS (Patient taking differently: PRN), Disp: 30 capsule, Rfl: 0    carbidopa-levodopa  mg (SINEMET)  mg per tablet, Take 1 tablet by mouth 4 (four) times daily., Disp: 360 tablet, Rfl: 3        Past Medical History:   Diagnosis Date    Allergy     Anxiety     Asthma     Coronary artery disease 10/18/2018    DM (diabetes mellitus)     Essential hypertension 10/21/2020    IBS (irritable bowel syndrome)     Migraine headache     Mitral valve prolapse     Type 2 diabetes mellitus with hyperlipidemia 9/6/2016     Past Surgical History:   Procedure Laterality Date    BREAST BIOPSY      BREAST LUMPECTOMY      HYSTERECTOMY      partial    MS COLONOSCOPY,REMV LESN,FORCEP/CAUTERY  8/23/2012          Social History     Socioeconomic History    Marital status:    Tobacco Use    Smoking status: Never    Smokeless tobacco: Never   Substance and Sexual Activity    Alcohol use: No    Drug use: No    Sexual activity: Not Currently             Past/Current Medical/Surgical History, Past/Current Social History, Past/Current Family History and Past/Current Medications were reviewed in detail.        Objective:           VITAL SIGNS WERE REVIEWED      GENERAL APPEARANCE:     The patient looks comfortable.    BMI 23.73    No signs of respiratory distress.    Normal breathing pattern.    No dysmorphic features    Normal eye contact.       GENERAL MEDICAL EXAM:    HEENT:  Head is atraumatic normocephalic.     FUNDOSCOPIC (OPHTHALMOSCOPIC) EXAMINATION showed no disc edema.      NECK: No JVD. No visible lesions or goiters.     CHEST-CARDIOPULMONARY: No cyanosis. No tachypnea. Normal respiratory effort.    [id number]: No jaundice. No stomas or lesions. No  visible hernias. No catheters.     SKIN, HAIR, NAILS: No pathognomonic skin rash.No neurofibromatosis. No visible lesions.No stigmata of autoimmune disease. No clubbing.    LIMBS: No varicose veins. No visible swelling.    MUSCULOSKELETAL: No visible deformities.No visible lesions.           Neurologic Exam     Mental Status   Oriented to person, place, and time.   Follows 3 step commands.   Attention: normal. Concentration: normal.   Speech: speech is normal   Level of consciousness: alert  Able to name object. Able to repeat. Normal comprehension.     Cranial Nerves   Cranial nerves II through XII intact.     CN II   Visual fields full to confrontation.   Visual acuity: normal  Right visual field deficit: none  Left visual field deficit: none     CN III, IV, VI   Pupils are equal, round, and reactive to light.  Extraocular motions are normal.   Right pupil: Size: 2 mm. Shape: regular. Reactivity: brisk. Consensual response: intact. Accommodation: intact.   Left pupil: Size: 2 mm. Shape: regular. Reactivity: brisk. Consensual response: intact. Accommodation: intact.   CN III: no CN III palsy  CN VI: no CN VI palsy  Nystagmus: none   Diplopia: none  Ophthalmoparesis: none  Upgaze: normal  Downgaze: normal  Conjugate gaze: present  Vestibulo-ocular reflex: present    CN V   Facial sensation intact.   Right facial sensation deficit: none  Left facial sensation deficit: none  Jaw jerk: normal    CN VII   Facial expression full, symmetric.   Right facial weakness: none  Left facial weakness: none    CN VIII   CN VIII normal.   Hearing: intact    CN IX, X   CN IX normal.   CN X normal.   Palate: symmetric    CN XI   CN XI normal.   Right sternocleidomastoid strength: normal  Left sternocleidomastoid strength: normal  Right trapezius strength: normal  Left trapezius strength: normal    CN XII   CN XII normal.   Tongue: not atrophic  Fasciculations: absent  Tongue deviation: none    Motor Exam   Muscle bulk:  normal  Overall muscle tone: normal  Right arm tone: normal  Left arm tone: normal  Right arm pronator drift: absent  Left arm pronator drift: absent  Right leg tone: normal  Left leg tone: normal    Strength   Strength 5/5 throughout.   Right neck flexion: 5/5  Left neck flexion: 5/5  Right neck extension: 5/5  Left neck extension: 5/5  Right deltoid: 5/5  Left deltoid: 5/5  Right biceps: 5/5  Left biceps: 5/5  Right triceps: 5/5  Left triceps: 5/5  Right wrist flexion: 5/5  Left wrist flexion: 5/5  Right wrist extension: 5/5  Left wrist extension: 5/5  Right interossei: 5/5  Left interossei: 5/5  Right iliopsoas: 5/5  Left iliopsoas: 5/5  Right quadriceps: 5/5  Left quadriceps: 5/5  Right hamstrin/5  Left hamstrin/5  Right glutei: 5/5  Left glutei: 5/5  Right anterior tibial: 5/5  Left anterior tibial: 5/5  Right posterior tibial: 5/5  Left posterior tibial: 5/5  Right peroneal: 5/5  Left peroneal: 5/5  Right gastroc: 5/5  Left gastroc: 5/5    Sensory Exam   Light touch normal.   Right arm light touch: normal  Left arm light touch: normal  Right leg light touch: normal  Left leg light touch: normal  Right arm vibration: normal  Left arm vibration: normal  Right leg vibration: decreased from toes  Left leg vibration: decreased from toes  Proprioception normal.   Right arm proprioception: normal  Left arm proprioception: normal  Right leg proprioception: normal  Left leg proprioception: normal  Pinprick normal.   Right arm pinprick: normal  Left arm pinprick: normal  Right leg pinprick: normal  Left leg pinprick: normal  Graphesthesia: normal  Stereognosis: normal    Gait, Coordination, and Reflexes     Gait  Gait: normal    Coordination   Romberg: negative  Finger to nose coordination: normal  Heel to shin coordination: normal    Tremor   Resting tremor: present (LUE LLE)  Intention tremor: absent  Action tremor: absent    Reflexes   Right brachioradialis: 2+  Left brachioradialis: 2+  Right biceps:  2+  Left biceps: 2+  Right triceps: 2+  Left triceps: 2+  Right patellar: 2+  Left patellar: 2+  Right achilles: 1+  Left achilles: 1+  Right plantar: normal  Left plantar: normal  Right Olivo: absent  Left Olivo: absent  Right ankle clonus: absent  Left ankle clonus: absent  Right pendular knee jerk: absent  Left pendular knee jerk: absent    EXTRAPYRAMIDAL EXAMINATION FOR PARKINSONISM     KEY: 0 NL, 1 MILD, 2 MODERATE, 3 SEVERE, 4 ADVANCED.       HEAD AND NECK AND CRANIAL NERVES EXAMINATION:       Facial expressions: Mild Hypomimia.     Visual Acuity: Decreased.     Smell: Mild Anosmia.    Voice: Significant Hypophonia.     Swallowing: No Dysphagia or Sialorrhea.       NON-MOTOR EXAMINATION:      Autonomic: Mild Orthostasis    Mood: Reactive Depression    Emotional continence: No PBA.     Cognition: No Amnesia.    Behavior: No  VHs      INVOLUNTARY MOVEMENTS:     No Rest Tremor in the RUE     3 Hz  2/4 Rest Tremor in the LUE.     No Rest Tremor in the RLE     3 Hz 2/4 Rest Tremor in the LLE      TONE EXAMINATION:     No Nuchal Rigidity    No Appendicular Rigidity and Paratonia in the RUE    No Appendicular Rigidity and Paratonia in the LUE.     No appendicular Rigidity or Paratonia in the RLE    No appendicular Rigidity and Paratonia in the LLE.      BRADYKINESIA EXAMINATION:     Rapid Alternating Movements (Elza) are normal in the RT    Rapid Alternating Movements (Elza) are normal in the LT    Positive micrographia.      STANCE, POSTURAL STABILITY AND GAIT:     The patient was able to stand up with crossed arms without difficulty.    Postural stability by Retropulsion and Propulsion is 1/4 abnormal.     There is evidence of 1/4  gait apraxia, festination/shuffling and gait rigidity.                Lab Results   Component Value Date    WBC 7.36 03/29/2023    HGB 11.8 (L) 03/29/2023    HCT 37.2 03/29/2023    MCV 95 03/29/2023     03/29/2023     Sodium   Date Value Ref Range Status   03/29/2023 141 136 -  145 mmol/L Final     Potassium   Date Value Ref Range Status   03/29/2023 4.6 3.5 - 5.1 mmol/L Final     Chloride   Date Value Ref Range Status   03/29/2023 103 95 - 110 mmol/L Final     CO2   Date Value Ref Range Status   03/29/2023 26 23 - 29 mmol/L Final     Glucose   Date Value Ref Range Status   03/29/2023 100 70 - 110 mg/dL Final     BUN   Date Value Ref Range Status   03/29/2023 15 8 - 23 mg/dL Final     Creatinine   Date Value Ref Range Status   03/29/2023 1.0 0.5 - 1.4 mg/dL Final     Calcium   Date Value Ref Range Status   03/29/2023 9.8 8.7 - 10.5 mg/dL Final     Total Protein   Date Value Ref Range Status   03/29/2023 7.0 6.0 - 8.4 g/dL Final     Albumin   Date Value Ref Range Status   03/29/2023 3.7 3.5 - 5.2 g/dL Final     Total Bilirubin   Date Value Ref Range Status   03/29/2023 0.3 0.1 - 1.0 mg/dL Final     Comment:     For infants and newborns, interpretation of results should be based  on gestational age, weight and in agreement with clinical  observations.    Premature Infant recommended reference ranges:  Up to 24 hours.............<8.0 mg/dL  Up to 48 hours............<12.0 mg/dL  3-5 days..................<15.0 mg/dL  6-29 days.................<15.0 mg/dL       Alkaline Phosphatase   Date Value Ref Range Status   03/29/2023 69 55 - 135 U/L Final     AST   Date Value Ref Range Status   03/29/2023 16 10 - 40 U/L Final     ALT   Date Value Ref Range Status   03/29/2023 9 (L) 10 - 44 U/L Final     Anion Gap   Date Value Ref Range Status   03/29/2023 12 8 - 16 mmol/L Final     eGFR if    Date Value Ref Range Status   04/29/2022 >60.0 >60 mL/min/1.73 m^2 Final     eGFR if non    Date Value Ref Range Status   04/29/2022 53.3 (A) >60 mL/min/1.73 m^2 Final     Comment:     Calculation used to obtain the estimated glomerular filtration  rate (eGFR) is the CKD-EPI equation.        No results found for: [id number]  Lab Results   Component Value Date    TSH 3.358 03/29/2023          04-    Latoya Scan is abnormal and confirms Primary Parkinsonism like Parkinson's disease.       Reviewed the neuroimaging independently     Assessment:       1. Idiopathic Parkinson's disease    2. Tremor of left hand    3. Irritable bowel syndrome with diarrhea    4. Anxiety    5. Type 2 diabetes mellitus with hyperlipidemia    6. Coronary artery disease involving native coronary artery of native heart without angina pectoris    7. Migraine without aura and without status migrainosus, not intractable    8. Essential hypertension    9. Osteopenia of multiple sites    10. Hyperlipidemia associated with type 2 diabetes mellitus    11. Chronic cystitis    12. Dyslipidemia    13. HFrEF (heart failure with reduced ejection fraction)    14. Palpitations    15. Tremor        Plan:         IDIOPATHIC PARKINSON'S DISEASE (IPD), LEFT SIDE ONSET 2022      EVALUATION     LATOYA Scan (Iodine SPECT).       PHYSIOTHERAPY    PT/OT/ST/LSVT    Driving Evaluation.     Falling down precautions.      PHARMACOTHERAPY       Discussed the fact that medications tend to lose efficacy after 5-7 years and DBS needs to be considered then.    Discussed the fact that PD is progressive disease for which we do not have DMA. Our management is purely symptomatic.       DOPAMINERGIC MEDICATIONS:     Start Sinemet (CD/LD) 25/100 mg Tabs on EMPTY STOMACH. Start ½ tab PO QID and titrate to 1 tab PO QID over 1 week. The maximum is 8 tabs. I made the patient aware that nausea, vomiting, hallucinations, sedation and orthostasis the main side effects.     Other Formulations of CD/LD include:  CD/LD CR Tabs, CD/LD ER Caps (Rytary), CD/LD ODT (Parcopa), CD/LD PEG (Duopa) and CD/LD INH (Inbrija)      Other Options:  Dopamine Agonists (Ropirinole (Requip), Pramipexole (Mirapex) and Rotigotine (Neupro) (TD Patch)      ADJUNCTIVE TREATMENTS     AMANTADINE    If CD/LD-induced dyskinesia (chorea) emerges will consider Amantadine 100 mg BID as  anti-dyskinetic (Anti CD/LD-induced chorea)     COMT INHIBITORS     If CD/LD  ON/OFF periods start to emerge will consider Entacapone (Comtan) 200 mg PO with CD/LD.     Other options: Tolcapone (Tasmar), Opicapone (Ongentys).    MAOI    Seligiline (Zelapar), Rosagiline (Azilect)    The risk of interactions outweigh the marginal benefits.     FREEZING SPELLS     If freezing spells become life altering will consider Apomorphine SL (Kynmobi) 10-30 mg PRN with close monitoring of BP and OH (Risk of hypotension and bradycardia)    Will avoid SQ (Apokyn).     NON-MOTOR SYMPTOMS    DYSPHAGIA-SIALORRHEA    Not an issue.      ORTHOSTASIS    Monitor clinically.     Orthostatic measures.     DEPRESSION    Not an issue.    On TCA (Amitriptyline) 50 mg QHS     PSEUDOBULBAR AFFECT (PBA)    Not an issue.    COGNITION-AMNESIA     Not an issue.    REM BEHAVIORAL DISORDER (RBD)    CONSTIPATION    Not an issue.      INTERVENTIONAL MANAGEMENT       Discussed the fact that medications tend to lose efficacy after an average 5-7 years (which does not apply to every patient).    Options include:     Neuravive (MRI guided high intensity focused ultrasound (MRIgFUS) (FUS)    Gamma Knife    Deep Brain Stimulation (DBS)         MEDICAL/SURGICAL COMORBIDITIES     All relevant medical comorbidities noted and managed by primary care physician and medical care team.          HEALTHY LIFESTYLE AND PREVENTATIVE CARE    The patient to adhere to the age-appropriate health maintenance guidelines including screening tests and vaccinations. The patient to adhere to  healthy lifestyle, optimal weight, exercise, healthy diet, good sleep hygiene and avoiding drugs including smoking, alcohol and recreational drugs.          Herberth Christy MD, FAAN    Attending Neurologist/Epileptologist         Diplomate, American Board of Psychiatry and Neurology    Diplomate, American Board of Clinical Neurophysiology     Fellow, American Academy of Neurology     I spent a  total of 61 minutes on the day of the visit.  This includes face to face time and non-face to face time preparing to see the patient (eg, review of tests), obtaining and/or reviewing separately obtained history, documenting clinical information in the electronic or other health record, independently interpreting results and communicating results to the patient/family/caregiver, or care coordinator.

## 2023-04-24 ENCOUNTER — TELEPHONE (OUTPATIENT)
Dept: NEUROLOGY | Facility: CLINIC | Age: 83
End: 2023-04-24
Payer: MEDICARE

## 2023-04-24 NOTE — TELEPHONE ENCOUNTER
----- Message from Herberth Christy MD sent at 4/24/2023  2:26 PM CDT -----      04-    Jackie Scan is abnormal and confirms Primary Parkinsonism like Parkinson's disease.

## 2023-04-24 NOTE — TELEPHONE ENCOUNTER
Attempted to contact patient daughter in regards to  recommendation. I was unable to speak with her so I left  a VM to contact our office.

## 2023-04-24 NOTE — TELEPHONE ENCOUNTER
Advised daughter or  recommendation and she did verbalized understanding. She stated her mother is going to be taking 1/2 starting for two times daily and then build up to 4 times daily.

## 2023-04-24 NOTE — TELEPHONE ENCOUNTER
Advised patient daughter Ms. Bose of Jackie Scan results. Daughter did verbalized understanding but wanted to report something about rx Carbidopa-levodopa. She stated that the medication caused her mother to have stomach pains, nausea and diarrhea. The patient is no longer taking the medication. The daughter wanted to know if you recommend lowering the dosage.

## 2023-04-24 NOTE — TELEPHONE ENCOUNTER
----- Message from Brenda Grider sent at 4/24/2023  3:30 PM CDT -----  Contact: patient's daughter Lidya [phone]  Patient's daughter Lidya returning a call from Dr. Christy's nurse

## 2023-04-26 ENCOUNTER — TELEPHONE (OUTPATIENT)
Dept: NEUROLOGY | Facility: CLINIC | Age: 83
End: 2023-04-26
Payer: MEDICARE

## 2023-04-26 NOTE — TELEPHONE ENCOUNTER
Patient has request her physical therapy to go to [facility]. Fax number sent to [phone].

## 2023-04-26 NOTE — TELEPHONE ENCOUNTER
----- Message from Adelina Kamara sent at 4/26/2023  2:13 PM CDT -----  Contact: Self  Patient is calling requesting therapy in [place]. Please call back [phone]

## 2023-04-26 NOTE — TELEPHONE ENCOUNTER
----- Message from Adelina Kamara sent at 4/26/2023  2:13 PM CDT -----  Contact: Self  Patient is calling requesting therapy in [place]. Please call back [phone]

## 2023-05-04 ENCOUNTER — LAB VISIT (OUTPATIENT)
Dept: LAB | Facility: HOSPITAL | Age: 83
End: 2023-05-04
Attending: INTERNAL MEDICINE
Payer: MEDICARE

## 2023-05-04 DIAGNOSIS — E11.69 TYPE 2 DIABETES MELLITUS WITH HYPERLIPIDEMIA: ICD-10-CM

## 2023-05-04 DIAGNOSIS — E78.5 TYPE 2 DIABETES MELLITUS WITH HYPERLIPIDEMIA: ICD-10-CM

## 2023-05-04 LAB
ESTIMATED AVG GLUCOSE: 137 MG/DL (ref 68–131)
HBA1C MFR BLD: 6.4 % (ref 4–5.6)

## 2023-05-04 PROCEDURE — 83036 HEMOGLOBIN GLYCOSYLATED A1C: CPT | Performed by: INTERNAL MEDICINE

## 2023-05-04 PROCEDURE — 36415 COLL VENOUS BLD VENIPUNCTURE: CPT | Mod: PO | Performed by: INTERNAL MEDICINE

## 2023-06-10 NOTE — PROGRESS NOTES
Results have been released via [employer]. Please verify that these have been viewed by patient. If not, please call patient with results.     I have sent a message to them with the following interpretation (see below).    I have reviewed your recent blood work.     A1C NORMAL-The A1c is at goal. Repeat an a1c in 6 months.       Please do not hesitate to call or message with any additional questions or concerns.    Bell Hunter PA-C

## 2023-06-19 ENCOUNTER — TELEPHONE (OUTPATIENT)
Dept: NEUROLOGY | Facility: CLINIC | Age: 83
End: 2023-06-19
Payer: MEDICARE

## 2023-06-19 ENCOUNTER — OFFICE VISIT (OUTPATIENT)
Dept: FAMILY MEDICINE | Facility: CLINIC | Age: 83
End: 2023-06-19
Payer: MEDICARE

## 2023-06-19 VITALS
WEIGHT: 135.69 LBS | DIASTOLIC BLOOD PRESSURE: 79 MMHG | SYSTOLIC BLOOD PRESSURE: 133 MMHG | HEIGHT: 66 IN | BODY MASS INDEX: 21.81 KG/M2 | HEART RATE: 114 BPM

## 2023-06-19 DIAGNOSIS — R30.0 DYSURIA: ICD-10-CM

## 2023-06-19 DIAGNOSIS — N30.00 ACUTE CYSTITIS WITHOUT HEMATURIA: Primary | ICD-10-CM

## 2023-06-19 LAB
BACTERIA #/AREA URNS HPF: ABNORMAL /HPF
BILIRUB UR QL STRIP: ABNORMAL
CLARITY UR: ABNORMAL
COLOR UR: ABNORMAL
GLUCOSE UR QL STRIP: ABNORMAL
HGB UR QL STRIP: ABNORMAL
KETONES UR QL STRIP: ABNORMAL
LEUKOCYTE ESTERASE UR QL STRIP: ABNORMAL
MICROSCOPIC COMMENT: ABNORMAL
NITRITE UR QL STRIP: ABNORMAL
PH UR STRIP: ABNORMAL [PH] (ref 5–8)
PROT UR QL STRIP: ABNORMAL
RBC #/AREA URNS HPF: >100 /HPF (ref 0–4)
SP GR UR STRIP: ABNORMAL (ref 1–1.03)
SQUAMOUS #/AREA URNS HPF: 1 /HPF
URN SPEC COLLECT METH UR: ABNORMAL
WBC #/AREA URNS HPF: >100 /HPF (ref 0–5)

## 2023-06-19 PROCEDURE — 87086 URINE CULTURE/COLONY COUNT: CPT | Performed by: PHYSICIAN ASSISTANT

## 2023-06-19 PROCEDURE — 99214 OFFICE O/P EST MOD 30 MIN: CPT | Mod: PBBFAC,PO | Performed by: PHYSICIAN ASSISTANT

## 2023-06-19 PROCEDURE — 87088 URINE BACTERIA CULTURE: CPT | Performed by: PHYSICIAN ASSISTANT

## 2023-06-19 PROCEDURE — 99213 PR OFFICE/OUTPT VISIT, EST, LEVL III, 20-29 MIN: ICD-10-PCS | Mod: S$PBB,,, | Performed by: PHYSICIAN ASSISTANT

## 2023-06-19 PROCEDURE — 99999 PR PBB SHADOW E&M-EST. PATIENT-LVL IV: ICD-10-PCS | Mod: PBBFAC,,, | Performed by: PHYSICIAN ASSISTANT

## 2023-06-19 PROCEDURE — 99213 OFFICE O/P EST LOW 20 MIN: CPT | Mod: S$PBB,,, | Performed by: PHYSICIAN ASSISTANT

## 2023-06-19 PROCEDURE — 87077 CULTURE AEROBIC IDENTIFY: CPT | Performed by: PHYSICIAN ASSISTANT

## 2023-06-19 PROCEDURE — 87186 SC STD MICRODIL/AGAR DIL: CPT | Performed by: PHYSICIAN ASSISTANT

## 2023-06-19 PROCEDURE — 81000 URINALYSIS NONAUTO W/SCOPE: CPT | Mod: PO | Performed by: PHYSICIAN ASSISTANT

## 2023-06-19 PROCEDURE — 99999 PR PBB SHADOW E&M-EST. PATIENT-LVL IV: CPT | Mod: PBBFAC,,, | Performed by: PHYSICIAN ASSISTANT

## 2023-06-19 RX ORDER — NITROFURANTOIN 25; 75 MG/1; MG/1
100 CAPSULE ORAL 2 TIMES DAILY
Qty: 10 CAPSULE | Refills: 0 | Status: SHIPPED | OUTPATIENT
Start: 2023-06-19 | End: 2023-06-24

## 2023-06-19 RX ORDER — METHENAMINE, SODIUM PHOSPHATE, MONOBASIC, ANHYDROUS, PHENYL SALICYLATE, METHYLENE BLUE AND HYOSCYAMINE SULFATE 118; 40.8; 36; 10; .12 MG/1; MG/1; MG/1; MG/1; MG/1
CAPSULE ORAL
Qty: 30 CAPSULE | Refills: 0 | Status: CANCELLED | OUTPATIENT
Start: 2023-06-19

## 2023-06-19 NOTE — PROGRESS NOTES
Assessment/Plan:    Problem List Items Addressed This Visit    None  Visit Diagnoses       Acute cystitis without hematuria    -  Primary  -UA positive for infection  -start antibiotics as prescribed  -increase hydration with water  -follow up if no improvement or worsening of symptoms       Relevant Medications    nitrofurantoin, macrocrystal-monohydrate, (MACROBID) 100 MG capsule    laney-stacyNakitablue-s.phos-phsal-hyo (URIBEL) 118-10-40.8-36 mg Cap    Dysuria        Relevant Orders    Urinalysis, Reflex to Urine Culture Urine, Clean Catch (Completed)          Follow up if symptoms worsen or fail to improve.    Bell Hunter PA-C  _____________________________________________________________________________________________________________________________________________________    CC: urinary symptoms     HPI: Patient is in clinic today as an established patient here for urinary symptoms. Symptom onset was about 4 days ago, and has been unchanged since that time. She reports dysuria, urgency, frequency, and suprapubic pain. Denies fever, hematuria, nausea, vomiting, flank pain, or vaginal symptoms. She has a history of recurrent UTIs, however, her last UTI was about >5 months ago. She denies history of pyelonephritis. She has previously tried Uribel which helps with symptoms. No other complaints today.    Past Medical History:   Diagnosis Date    Allergy     Anxiety     Asthma     Coronary artery disease 10/18/2018    DM (diabetes mellitus)     Essential hypertension 10/21/2020    IBS (irritable bowel syndrome)     Migraine headache     Mitral valve prolapse     Type 2 diabetes mellitus with hyperlipidemia 9/6/2016     Past Surgical History:   Procedure Laterality Date    BREAST BIOPSY      BREAST LUMPECTOMY      HYSTERECTOMY      partial    DE COLONOSCOPY,REMV LIZET,FORCEP/CAUTERY  8/23/2012          Review of patient's allergies indicates:   Allergen Reactions    Codeine      Other reaction(s): Unknown  unknown     Diclofenac Nausea And Vomiting    Doxycycline      Other reaction(s): Unknown  unknown    Iodinated contrast media Hives     Pt reports rxn to iv dye years ago,but no rxn to oral contrast    Sulfa (sulfonamide antibiotics)      Other reaction(s): Unknown  unknown     Social History     Tobacco Use    Smoking status: Never    Smokeless tobacco: Never   Substance Use Topics    Alcohol use: No    Drug use: No     Family History   Problem Relation Age of Onset    Heart disease Mother     Diabetes Brother     Heart disease Brother     Early death Brother     Breast cancer Maternal Aunt      Current Outpatient Medications on File Prior to Visit   Medication Sig Dispense Refill    ALPRAZolam (XANAX) 0.25 MG tablet TAKE 1 TABLET BY MOUTH 4 TIMES DAILY AS NEEDED 90 tablet 5    amitriptyline (ELAVIL) 50 MG tablet TAKE 1 TABLET BY MOUTH EVERY EVENING 90 tablet 3    aspirin (ECOTRIN) 81 MG EC tablet Take 81 mg by mouth once daily.      atorvastatin (LIPITOR) 80 MG tablet Take 80 mg by mouth.      blood sugar diagnostic (CONTOUR TEST STRIPS) Strp TEST BLOOD GLUCOSE ONCE DAILY 100 each 11    clopidogrel (PLAVIX) 75 mg tablet Take 75 mg by mouth once daily.      colestipoL (COLESTID) 1 gram Tab Take 1 tablet (1 g total) by mouth 2 (two) times daily. 180 tablet 3    fluorometholone 0.1% (FML) 0.1 % DrpS Place 1 drop into both eyes 4 (four) times daily.      furosemide (LASIX) 20 MG tablet Take 20 mg by mouth daily as needed.      lancets (MICROLET LANCET) Misc TEST BLOOD GLUCOSE ONCE DAILY 100 each 3    metFORMIN (GLUCOPHAGE) 500 MG tablet Take 1 tablet (500 mg total) by mouth 2 (two) times daily. 180 tablet 3    metoprolol succinate (TOPROL-XL) 100 MG 24 hr tablet Take 50 mg by mouth once daily.       nystatin (MYCOSTATIN) powder Apply topically 4 (four) times daily. 60 g 0    [DISCONTINUED] URO--10-40.8-36 mg Cap TAKE 1 CAPSULE BY MOUTH EVERY 6 HOURS AS NEEDED FOR URINARY SYMPTOMS (Patient taking differently: PRN) 30  "capsule 0    carbidopa-levodopa  mg (SINEMET)  mg per tablet Take 1 tablet by mouth 4 (four) times daily. (Patient not taking: Reported on 6/19/2023) 360 tablet 3    lifitegrast (XIIDRA) 5 % Dpet Place 1 drop into both eyes 2 (two) times daily.       No current facility-administered medications on file prior to visit.       Review of Systems   Constitutional:  Negative for chills, diaphoresis, fatigue and fever.   HENT:  Negative for congestion, ear pain, postnasal drip, sinus pain and sore throat.    Eyes:  Negative for pain and redness.   Respiratory:  Negative for cough, chest tightness and shortness of breath.    Cardiovascular:  Negative for chest pain and leg swelling.   Gastrointestinal:  Positive for abdominal pain. Negative for constipation, diarrhea, nausea and vomiting.   Genitourinary:  Positive for dysuria, frequency and urgency. Negative for hematuria.   Musculoskeletal:  Negative for arthralgias and joint swelling.   Skin:  Negative for rash.   Neurological:  Negative for dizziness, syncope and headaches.   Psychiatric/Behavioral:  Negative for dysphoric mood. The patient is not nervous/anxious.      Vitals:    06/19/23 1047   BP: 133/79   Pulse: (!) 114   Weight: 61.6 kg (135 lb 11.2 oz)   Height: 5' 6" (1.676 m)       Wt Readings from Last 3 Encounters:   06/19/23 61.6 kg (135 lb 11.2 oz)   04/14/23 66.7 kg (147 lb 0.8 oz)   03/29/23 68.4 kg (150 lb 11.2 oz)       Physical Exam  Constitutional:       General: She is not in acute distress.     Appearance: Normal appearance. She is well-developed.   HENT:      Head: Normocephalic and atraumatic.   Eyes:      Conjunctiva/sclera: Conjunctivae normal.   Cardiovascular:      Rate and Rhythm: Normal rate and regular rhythm.      Pulses: Normal pulses.      Heart sounds: Normal heart sounds. No murmur heard.  Pulmonary:      Effort: Pulmonary effort is normal. No respiratory distress.      Breath sounds: Normal breath sounds.   Abdominal:      " General: Bowel sounds are normal. There is no distension.      Palpations: Abdomen is soft.      Tenderness: There is abdominal tenderness in the suprapubic area. There is no right CVA tenderness or left CVA tenderness.   Musculoskeletal:         General: Normal range of motion.      Cervical back: Normal range of motion and neck supple.   Skin:     General: Skin is warm and dry.      Findings: No rash.   Neurological:      General: No focal deficit present.      Mental Status: She is alert and oriented to person, place, and time.   Psychiatric:         Mood and Affect: Mood normal.         Behavior: Behavior normal.       Health Maintenance   Topic Date Due    TETANUS VACCINE  Never done    Lipid Panel  04/29/2023    Eye Exam  09/30/2023    DEXA Scan  10/30/2023    Hemoglobin A1c  11/04/2023    Aspirin/Antiplatelet Therapy  06/19/2024

## 2023-06-19 NOTE — TELEPHONE ENCOUNTER
----- Message from Herberth Christy MD sent at 6/19/2023  2:55 PM CDT -----    Will cut the dose in half and see if it helps with side effects.    ----- Message -----  From: Bell Hunter PA-C  Sent: 6/19/2023   1:02 PM CDT  To: Herberth Christy MD    Hi Dr. Christy,     I saw Ms. Chi today and she told me she was having some issues with her Sinemet and asked if I would reach out to you regarding this. She has not been taking the medication due to adverse effects and her tremors are getting worse. She has an appointment scheduled in August, but wanted me to reach out to see if there was another medication she could possibly try in place of this or to see if she needed a sooner appointment to discuss this with you.     Sincerely,  Bell Hunter PA-C

## 2023-06-21 ENCOUNTER — TELEPHONE (OUTPATIENT)
Dept: NEUROLOGY | Facility: CLINIC | Age: 83
End: 2023-06-21
Payer: MEDICARE

## 2023-06-21 ENCOUNTER — TELEPHONE (OUTPATIENT)
Dept: FAMILY MEDICINE | Facility: CLINIC | Age: 83
End: 2023-06-21
Payer: MEDICARE

## 2023-06-21 DIAGNOSIS — G20.A1 IDIOPATHIC PARKINSON'S DISEASE: Primary | ICD-10-CM

## 2023-06-21 LAB — BACTERIA UR CULT: ABNORMAL

## 2023-06-21 RX ORDER — PRAMIPEXOLE DIHYDROCHLORIDE 0.12 MG/1
0.12 TABLET ORAL 3 TIMES DAILY
Qty: 90 TABLET | Refills: 5 | Status: SHIPPED | OUTPATIENT
Start: 2023-06-21 | End: 2023-08-21 | Stop reason: SDUPTHER

## 2023-06-21 NOTE — TELEPHONE ENCOUNTER
----- Message from Luis Toure sent at 6/21/2023 12:06 PM CDT -----  Contact: self  Pt is asking for an return call in reference to medication she was prescribed states medications is making her not feel well, please call back at .[phone] Thx CJ

## 2023-06-21 NOTE — TELEPHONE ENCOUNTER
Advised patient of new Rx being called in for her. Patient did verbalized understanding. Advised to reach back out to us with any updates.

## 2023-06-21 NOTE — TELEPHONE ENCOUNTER
I spoke with patient in regards to her Rx Carbidopa-levodopa  mg. Patient stated that she tried taking the half dose to see if it helped with her side effects. Patient called today and stated she was still feeling weakness and sick. She wanted to know what you recommend doing. Patient is currently not taking the medication anymore.

## 2023-06-21 NOTE — TELEPHONE ENCOUNTER
----- Message from Tamiko Boyd sent at 6/21/2023 11:47 AM CDT -----  Contact: Alon  .Type:  Patient Returning Call    Who Called:Alon  Who Left Message for Patient:nurse  Does the patient know what this is regarding?:yes  Would the patient rather a call back or a response via [facility]? Call back  Best Call Back Number:[phone]  Additional Information: na        Thanks  DORA

## 2023-06-22 NOTE — PROGRESS NOTES
Results have been released via [facility]. Please verify that these have been viewed by patient. If not, please call patient with results.     I have sent a message to them with the following interpretation (see below).    I have reviewed your recent urine culture.    Bacteria is sensitive to the antibiotic prescribed. Please complete the medication and follow up if symptoms persist.    Please do not hesitate to call or message with any additional questions or concerns.    Bell Hunter PA-C

## 2023-06-30 ENCOUNTER — TELEPHONE (OUTPATIENT)
Dept: FAMILY MEDICINE | Facility: CLINIC | Age: 83
End: 2023-06-30
Payer: MEDICARE

## 2023-06-30 NOTE — TELEPHONE ENCOUNTER
I have attempted to contact the pharmacy regarding this. Pharmacy is currently closed, left detailed message on voicemail.

## 2023-06-30 NOTE — TELEPHONE ENCOUNTER
----- Message from Digna Sibley sent at 6/30/2023  8:38 AM CDT -----  Type:  Pharmacy Calling to Clarify an RX    Name of Caller:nadya  Pharmacy Name:  [id number] - [place] - [address] [facility]  [address] [place] [id number]  Phone: [phone] Fax: [phone]      Prescription Name:URIBEL  What do they need to clarify?:drug interaction with amitriptyline (ELAVIL)  Best Call Back Number:[phone] opt 4  call back after 9:00a  Additional Information:

## 2023-08-16 ENCOUNTER — PES CALL (OUTPATIENT)
Dept: ADMINISTRATIVE | Facility: CLINIC | Age: 83
End: 2023-08-16
Payer: MEDICARE

## 2023-08-16 ENCOUNTER — PATIENT MESSAGE (OUTPATIENT)
Dept: ADMINISTRATIVE | Facility: CLINIC | Age: 83
End: 2023-08-16
Payer: MEDICARE

## 2023-08-21 ENCOUNTER — OFFICE VISIT (OUTPATIENT)
Dept: NEUROLOGY | Facility: CLINIC | Age: 83
End: 2023-08-21
Payer: MEDICARE

## 2023-08-21 VITALS
BODY MASS INDEX: 21.86 KG/M2 | DIASTOLIC BLOOD PRESSURE: 75 MMHG | WEIGHT: 136 LBS | OXYGEN SATURATION: 99 % | SYSTOLIC BLOOD PRESSURE: 154 MMHG | HEIGHT: 66 IN | RESPIRATION RATE: 16 BRPM | HEART RATE: 60 BPM

## 2023-08-21 DIAGNOSIS — E11.69 HYPERLIPIDEMIA ASSOCIATED WITH TYPE 2 DIABETES MELLITUS: ICD-10-CM

## 2023-08-21 DIAGNOSIS — M85.89 OSTEOPENIA OF MULTIPLE SITES: ICD-10-CM

## 2023-08-21 DIAGNOSIS — E78.5 HYPERLIPIDEMIA ASSOCIATED WITH TYPE 2 DIABETES MELLITUS: ICD-10-CM

## 2023-08-21 DIAGNOSIS — K58.0 IRRITABLE BOWEL SYNDROME WITH DIARRHEA: ICD-10-CM

## 2023-08-21 DIAGNOSIS — R25.1 TREMOR: ICD-10-CM

## 2023-08-21 DIAGNOSIS — N30.20 CHRONIC CYSTITIS: ICD-10-CM

## 2023-08-21 DIAGNOSIS — E78.5 TYPE 2 DIABETES MELLITUS WITH HYPERLIPIDEMIA: ICD-10-CM

## 2023-08-21 DIAGNOSIS — E78.5 DYSLIPIDEMIA: ICD-10-CM

## 2023-08-21 DIAGNOSIS — I25.10 CORONARY ARTERY DISEASE INVOLVING NATIVE CORONARY ARTERY OF NATIVE HEART WITHOUT ANGINA PECTORIS: ICD-10-CM

## 2023-08-21 DIAGNOSIS — I50.20 HFREF (HEART FAILURE WITH REDUCED EJECTION FRACTION): ICD-10-CM

## 2023-08-21 DIAGNOSIS — E11.69 TYPE 2 DIABETES MELLITUS WITH HYPERLIPIDEMIA: ICD-10-CM

## 2023-08-21 DIAGNOSIS — R00.2 PALPITATIONS: ICD-10-CM

## 2023-08-21 DIAGNOSIS — G43.009 MIGRAINE WITHOUT AURA AND WITHOUT STATUS MIGRAINOSUS, NOT INTRACTABLE: ICD-10-CM

## 2023-08-21 DIAGNOSIS — G20.A1 IDIOPATHIC PARKINSON'S DISEASE: Primary | ICD-10-CM

## 2023-08-21 DIAGNOSIS — F41.9 ANXIETY: ICD-10-CM

## 2023-08-21 DIAGNOSIS — I10 ESSENTIAL HYPERTENSION: ICD-10-CM

## 2023-08-21 PROCEDURE — 99999 PR PBB SHADOW E&M-EST. PATIENT-LVL IV: ICD-10-PCS | Mod: PBBFAC,,, | Performed by: PSYCHIATRY & NEUROLOGY

## 2023-08-21 PROCEDURE — 99999 PR PBB SHADOW E&M-EST. PATIENT-LVL IV: CPT | Mod: PBBFAC,,, | Performed by: PSYCHIATRY & NEUROLOGY

## 2023-08-21 PROCEDURE — 99215 PR OFFICE/OUTPT VISIT, EST, LEVL V, 40-54 MIN: ICD-10-PCS | Mod: S$PBB,,, | Performed by: PSYCHIATRY & NEUROLOGY

## 2023-08-21 PROCEDURE — 99215 OFFICE O/P EST HI 40 MIN: CPT | Mod: S$PBB,,, | Performed by: PSYCHIATRY & NEUROLOGY

## 2023-08-21 PROCEDURE — 99214 OFFICE O/P EST MOD 30 MIN: CPT | Mod: PBBFAC | Performed by: PSYCHIATRY & NEUROLOGY

## 2023-08-21 RX ORDER — PRAMIPEXOLE DIHYDROCHLORIDE 0.25 MG/1
0.25 TABLET ORAL 3 TIMES DAILY
Qty: 270 TABLET | Refills: 3 | Status: SHIPPED | OUTPATIENT
Start: 2023-08-21 | End: 2024-08-20

## 2023-08-21 NOTE — PROGRESS NOTES
Subjective:       Patient ID: Alon Chatman is a 83 y.o. female.    Chief Complaint: No chief complaint on file.          HPI        BACKGROUND HISTORY       The patient started having tremors in 2022.  The patient's tremors are strictly in the LUE and LLE. No head tremors. No voice tremors. The tremors seem to be mainly at rest and during activity. The patient's gait slowed down. Postural stability is impaired alongside with occasional mild postural lightheadedness. The patient also complains of muscle stiffness. No hallucinations. No delusions. No alteration of mental status.  No language or communication problems. No muscle weakness or twitching. No trouble swallowing. No eye movement difficulty. The patient's voice tone and volume have decreased. In addition, the facial expressions have declined. The handwriting has gotten smaller. No history of TBI. No history of urine incontinence. No history of memory loss. No  drooling and no trouble swallowing. The patient reports reactive depression. Denies spells of sudden laughing but they seem to be unprovoked. No suicidal or homicidal ideations. No antipsychotic or antiemetic meds use for prolonged time. No history of CO poisoning. No family history of similar symptoms. The patient denies history of strokes. No history of occupational toxic exposure. No history of chronic liver disease. Ordered Jackie Scan and started her on CD/LD 25/100 mg QID.I also recommended PT.       INTERVAL HISTORY       On 04- Jackie Scan is abnormal and confirms Primary Parkinsonism like Parkinson's disease. Could not tolerate CD/LD due to N/V. Tolerated Mirapex 0.125 mg TID which's less effective than CD/LD. The patient has enjoyed and benefited tremendously from PT.         Review of Systems   Constitutional:  Negative for appetite change and fatigue.   HENT:  Negative for hearing loss and tinnitus.    Eyes:  Negative for photophobia and visual disturbance.   Respiratory:  Negative  for apnea and shortness of breath.    Cardiovascular:  Negative for chest pain and palpitations.   Gastrointestinal:  Negative for nausea and vomiting.   Endocrine: Negative for cold intolerance and heat intolerance.   Genitourinary:  Negative for difficulty urinating and urgency.   Musculoskeletal:  Positive for gait problem. Negative for arthralgias, back pain, joint swelling, myalgias, neck pain and neck stiffness.   Skin:  Negative for color change and rash.   Allergic/Immunologic: Negative for environmental allergies and immunocompromised state.   Neurological:  Positive for tremors and speech difficulty. Negative for dizziness, seizures, syncope, facial asymmetry, weakness, light-headedness, numbness and headaches.   Hematological:  Negative for adenopathy. Does not bruise/bleed easily.   Psychiatric/Behavioral:  Negative for agitation, behavioral problems, confusion, decreased concentration, dysphoric mood, hallucinations, self-injury, sleep disturbance and suicidal ideas. The patient is nervous/anxious. The patient is not hyperactive.                Current Outpatient Medications:     ALPRAZolam (XANAX) 0.25 MG tablet, TAKE 1 TABLET BY MOUTH 4 TIMES DAILY AS NEEDED, Disp: 90 tablet, Rfl: 5    amitriptyline (ELAVIL) 50 MG tablet, TAKE 1 TABLET BY MOUTH EVERY EVENING, Disp: 90 tablet, Rfl: 3    aspirin (ECOTRIN) 81 MG EC tablet, Take 81 mg by mouth once daily., Disp: , Rfl:     atorvastatin (LIPITOR) 80 MG tablet, Take 80 mg by mouth., Disp: , Rfl:     blood sugar diagnostic (CONTOUR TEST STRIPS) Strp, TEST BLOOD GLUCOSE ONCE DAILY, Disp: 100 each, Rfl: 11    clopidogrel (PLAVIX) 75 mg tablet, Take 75 mg by mouth once daily., Disp: , Rfl:     colestipoL (COLESTID) 1 gram Tab, Take 1 tablet (1 g total) by mouth 2 (two) times daily., Disp: 180 tablet, Rfl: 3    fluorometholone 0.1% (FML) 0.1 % DrpS, Place 1 drop into both eyes 4 (four) times daily., Disp: , Rfl:     furosemide (LASIX) 20 MG tablet, Take 20 mg by  mouth daily as needed., Disp: , Rfl:     lancets (MICROLET LANCET) Misc, TEST BLOOD GLUCOSE ONCE DAILY, Disp: 100 each, Rfl: 3    lifitegrast (XIIDRA) 5 % Dpet, Place 1 drop into both eyes 2 (two) times daily., Disp: , Rfl:     metFORMIN (GLUCOPHAGE) 500 MG tablet, Take 1 tablet (500 mg total) by mouth 2 (two) times daily., Disp: 180 tablet, Rfl: 3    metoprolol succinate (TOPROL-XL) 100 MG 24 hr tablet, Take 50 mg by mouth once daily. , Disp: , Rfl:     nystatin (MYCOSTATIN) powder, Apply topically 4 (four) times daily., Disp: 60 g, Rfl: 0    pramipexole (MIRAPEX) 0.125 MG tablet, Take 1 tablet (0.125 mg total) by mouth 3 (three) times daily., Disp: 90 tablet, Rfl: 5        Past Medical History:   Diagnosis Date    Allergy     Anxiety     Asthma     Coronary artery disease 10/18/2018    DM (diabetes mellitus)     Essential hypertension 10/21/2020    IBS (irritable bowel syndrome)     Migraine headache     Mitral valve prolapse     Type 2 diabetes mellitus with hyperlipidemia 9/6/2016     Past Surgical History:   Procedure Laterality Date    BREAST BIOPSY      BREAST LUMPECTOMY      HYSTERECTOMY      partial    MD COLONOSCOPY,REMV LESN,FORCEP/CAUTERY  8/23/2012          Social History     Socioeconomic History    Marital status:    Tobacco Use    Smoking status: Never    Smokeless tobacco: Never   Substance and Sexual Activity    Alcohol use: No    Drug use: No    Sexual activity: Not Currently             Past/Current Medical/Surgical History, Past/Current Social History, Past/Current Family History and Past/Current Medications were reviewed in detail.        Objective:           VITAL SIGNS WERE REVIEWED      GENERAL APPEARANCE:     The patient looks comfortable.    BMI 23.73>21.95    No signs of respiratory distress.    Normal breathing pattern.    No dysmorphic features    Normal eye contact.       GENERAL MEDICAL EXAM:    HEENT:  Head is atraumatic normocephalic.     FUNDOSCOPIC (OPHTHALMOSCOPIC)  EXAMINATION showed no disc edema.      NECK: No JVD. No visible lesions or goiters.     CHEST-CARDIOPULMONARY: No cyanosis. No tachypnea. Normal respiratory effort.    YYYCXFG-[id number]: No jaundice. No stomas or lesions. No visible hernias. No catheters.     SKIN, HAIR, NAILS: No pathognomonic skin rash.No neurofibromatosis. No visible lesions.No stigmata of autoimmune disease. No clubbing.    LIMBS: No varicose veins. No visible swelling.    MUSCULOSKELETAL: No visible deformities.No visible lesions.           Neurologic Exam     Mental Status   Oriented to person, place, and time.   Follows 3 step commands.   Attention: normal. Concentration: normal.   Speech: speech is normal   Level of consciousness: alert  Able to name object. Able to repeat. Normal comprehension.     Cranial Nerves   Cranial nerves II through XII intact.     CN II   Visual fields full to confrontation.   Visual acuity: normal  Right visual field deficit: none  Left visual field deficit: none     CN III, IV, VI   Pupils are equal, round, and reactive to light.  Extraocular motions are normal.   Right pupil: Size: 2 mm. Shape: regular. Reactivity: brisk. Consensual response: intact. Accommodation: intact.   Left pupil: Size: 2 mm. Shape: regular. Reactivity: brisk. Consensual response: intact. Accommodation: intact.   CN III: no CN III palsy  CN VI: no CN VI palsy  Nystagmus: none   Diplopia: none  Ophthalmoparesis: none  Upgaze: normal  Downgaze: normal  Conjugate gaze: present  Vestibulo-ocular reflex: present    CN V   Facial sensation intact.   Right facial sensation deficit: none  Left facial sensation deficit: none  Jaw jerk: normal    CN VII   Facial expression full, symmetric.   Right facial weakness: none  Left facial weakness: none    CN VIII   CN VIII normal.   Hearing: intact    CN IX, X   CN IX normal.   CN X normal.   Palate: symmetric    CN XI   CN XI normal.   Right sternocleidomastoid strength: normal  Left  sternocleidomastoid strength: normal  Right trapezius strength: normal  Left trapezius strength: normal    CN XII   CN XII normal.   Tongue: not atrophic  Fasciculations: absent  Tongue deviation: none    Motor Exam   Muscle bulk: normal  Overall muscle tone: normal  Right arm tone: normal  Left arm tone: normal  Right arm pronator drift: absent  Left arm pronator drift: absent  Right leg tone: normal  Left leg tone: normal    Strength   Strength 5/5 throughout.   Right neck flexion: 5/5  Left neck flexion: 5/5  Right neck extension: 5/5  Left neck extension: 5/5  Right deltoid: 5/5  Left deltoid: 5/5  Right biceps: 5/5  Left biceps: 5/5  Right triceps: 5/5  Left triceps: 5/5  Right wrist flexion: 5/5  Left wrist flexion: 5/5  Right wrist extension: 5/5  Left wrist extension: 5/5  Right interossei: 5/5  Left interossei: 5/5  Right iliopsoas: 5/5  Left iliopsoas: 5/5  Right quadriceps: 5/5  Left quadriceps: 5/5  Right hamstrin/5  Left hamstrin/5  Right glutei: 5/5  Left glutei: 5/5  Right anterior tibial: 5/5  Left anterior tibial: 5/5  Right posterior tibial: 5/5  Left posterior tibial: 5/5  Right peroneal: 5/5  Left peroneal: 5/5  Right gastroc: 5/5  Left gastroc: 5/5    Sensory Exam   Light touch normal.   Right arm light touch: normal  Left arm light touch: normal  Right leg light touch: normal  Left leg light touch: normal  Right arm vibration: normal  Left arm vibration: normal  Right leg vibration: decreased from toes  Left leg vibration: decreased from toes  Proprioception normal.   Right arm proprioception: normal  Left arm proprioception: normal  Right leg proprioception: normal  Left leg proprioception: normal  Pinprick normal.   Right arm pinprick: normal  Left arm pinprick: normal  Right leg pinprick: normal  Left leg pinprick: normal  Graphesthesia: normal  Stereognosis: normal    Gait, Coordination, and Reflexes     Gait  Gait: normal    Coordination   Romberg: negative  Finger to nose  coordination: normal  Heel to shin coordination: normal    Tremor   Resting tremor: present (LUE LLE)  Intention tremor: absent  Action tremor: absent    Reflexes   Right brachioradialis: 2+  Left brachioradialis: 2+  Right biceps: 2+  Left biceps: 2+  Right triceps: 2+  Left triceps: 2+  Right patellar: 2+  Left patellar: 2+  Right achilles: 1+  Left achilles: 1+  Right plantar: normal  Left plantar: normal  Right Olivo: absent  Left Olivo: absent  Right ankle clonus: absent  Left ankle clonus: absent  Right pendular knee jerk: absent  Left pendular knee jerk: absent    EXTRAPYRAMIDAL EXAMINATION FOR PARKINSONISM     KEY: 0 NL, 1 MILD, 2 MODERATE, 3 SEVERE, 4 ADVANCED.       HEAD AND NECK AND CRANIAL NERVES EXAMINATION:       Facial expressions: Mild Hypomimia.     Visual Acuity: Decreased.     Smell: Mild Anosmia.    Voice: Significant Hypophonia.     Swallowing: No Dysphagia or Sialorrhea.       NON-MOTOR EXAMINATION:      Autonomic: Mild Orthostasis    Mood: Reactive Depression    Emotional continence: No PBA.     Cognition: No Amnesia.    Behavior: No  VHs      INVOLUNTARY MOVEMENTS:     No Rest Tremor in the RUE     3 Hz  2/4 Rest Tremor in the LUE.     No Rest Tremor in the RLE     3 Hz 2/4 Rest Tremor in the LLE      TONE EXAMINATION:     No Nuchal Rigidity    No Appendicular Rigidity and Paratonia in the RUE    No Appendicular Rigidity and Paratonia in the LUE.     No appendicular Rigidity or Paratonia in the RLE    No appendicular Rigidity and Paratonia in the LLE.      BRADYKINESIA EXAMINATION:     Rapid Alternating Movements (Elza) are normal in the RT    Rapid Alternating Movements (Elza) are normal in the LT    Positive micrographia.      STANCE, POSTURAL STABILITY AND GAIT:     The patient was able to stand up with crossed arms without difficulty.    Postural stability by Retropulsion and Propulsion is 1/4 abnormal.     There is evidence of 1/4  gait apraxia, festination/shuffling and gait  rigidity.                Lab Results   Component Value Date    WBC 7.36 03/29/2023    HGB 11.8 (L) 03/29/2023    HCT 37.2 03/29/2023    MCV 95 03/29/2023     03/29/2023     Sodium   Date Value Ref Range Status   03/29/2023 141 136 - 145 mmol/L Final     Potassium   Date Value Ref Range Status   03/29/2023 4.6 3.5 - 5.1 mmol/L Final     Chloride   Date Value Ref Range Status   03/29/2023 103 95 - 110 mmol/L Final     CO2   Date Value Ref Range Status   03/29/2023 26 23 - 29 mmol/L Final     Glucose   Date Value Ref Range Status   03/29/2023 100 70 - 110 mg/dL Final     BUN   Date Value Ref Range Status   03/29/2023 15 8 - 23 mg/dL Final     Creatinine   Date Value Ref Range Status   03/29/2023 1.0 0.5 - 1.4 mg/dL Final     Calcium   Date Value Ref Range Status   03/29/2023 9.8 8.7 - 10.5 mg/dL Final     Total Protein   Date Value Ref Range Status   03/29/2023 7.0 6.0 - 8.4 g/dL Final     Albumin   Date Value Ref Range Status   03/29/2023 3.7 3.5 - 5.2 g/dL Final     Total Bilirubin   Date Value Ref Range Status   03/29/2023 0.3 0.1 - 1.0 mg/dL Final     Comment:     For infants and newborns, interpretation of results should be based  on gestational age, weight and in agreement with clinical  observations.    Premature Infant recommended reference ranges:  Up to 24 hours.............<8.0 mg/dL  Up to 48 hours............<12.0 mg/dL  3-5 days..................<15.0 mg/dL  6-29 days.................<15.0 mg/dL       Alkaline Phosphatase   Date Value Ref Range Status   03/29/2023 69 55 - 135 U/L Final     AST   Date Value Ref Range Status   03/29/2023 16 10 - 40 U/L Final     ALT   Date Value Ref Range Status   03/29/2023 9 (L) 10 - 44 U/L Final     Anion Gap   Date Value Ref Range Status   03/29/2023 12 8 - 16 mmol/L Final     eGFR if    Date Value Ref Range Status   04/29/2022 >60.0 >60 mL/min/1.73 m^2 Final     eGFR if non    Date Value Ref Range Status   04/29/2022 53.3 (A) >60  mL/min/1.73 m^2 Final     Comment:     Calculation used to obtain the estimated glomerular filtration  rate (eGFR) is the CKD-EPI equation.          Lab Results   Component Value Date    TSH 3.358 03/29/2023             LABORATORY EVALUATION         [id number]    CBC, CMP, TFT, HA1C only remarkable T2DM       RADIOLOGY EVALUATION         04-    Jackie Scan is abnormal and confirms Primary Parkinsonism like Parkinson's disease.       Reviewed the neuroimaging independently     Assessment:         1. Idiopathic Parkinson's disease    2. Irritable bowel syndrome with diarrhea    3. Tremor    4. Palpitations    5. HFrEF (heart failure with reduced ejection fraction)    6. Dyslipidemia    7. Chronic cystitis    8. Hyperlipidemia associated with type 2 diabetes mellitus    9. Osteopenia of multiple sites    10. Essential hypertension    11. Migraine without aura and without status migrainosus, not intractable    12. Coronary artery disease involving native coronary artery of native heart without angina pectoris    13. Type 2 diabetes mellitus with hyperlipidemia    14. Anxiety            Plan:         IDIOPATHIC PARKINSON'S DISEASE (IPD), LEFT SIDE ONSET 2022          PHYSIOTHERAPY    Continue PT/OT/ST/LSVT    Driving Evaluation down the road. Filled out form for handicap parking.     Falling down precautions.      PHARMACOTHERAPY       Discussed the fact that medications tend to lose efficacy after 5-7 years and DBS needs to be considered then.    Discussed the fact that PD is progressive disease for which we do not have DMA. Our management is purely symptomatic.       DOPAMINERGIC MEDICATIONS:       Continue Pramipexole (Mirapex) and change from 0.125 mg TID to 0.25 mg TID.      Failed CD/LD due to N/V.       NEXT Options:Ropirinole (Requip), and Rotigotine (Neupro) (TD Patch)      ADJUNCTIVE TREATMENTS     AMANTADINE    If dyskinesia (chorea) emerges will consider Amantadine 100 mg BID as anti-dyskinetic (Anti  CD/LD-induced chorea)     COMT INHIBITORS     If ON/OFF periods start to emerge will consider Entacapone (Comtan) 200 mg PO with CD/LD.     Other options: Tolcapone (Tasmar), Opicapone (Ongentys).    MAOI    Seligiline (Zelapar), Rosagiline (Azilect)    The risk of interactions outweigh the marginal benefits.     FREEZING SPELLS     If freezing spells become life altering will consider Apomorphine SL (Kynmobi) 10-30 mg PRN with close monitoring of BP and TN (Risk of hypotension and bradycardia)    Will avoid SQ (Apokyn).     NON-MOTOR SYMPTOMS    DYSPHAGIA-SIALORRHEA    Not an issue.      ORTHOSTASIS    Monitor clinically.     Orthostatic measures.     DEPRESSION    Not an issue.    On TCA (Amitriptyline) 50 mg QHS     PSEUDOBULBAR AFFECT (PBA)    Not an issue.    COGNITION-AMNESIA     Not an issue.    REM BEHAVIORAL DISORDER (RBD)    CONSTIPATION    Not an issue.      INTERVENTIONAL MANAGEMENT       Discussed the fact that medications tend to lose efficacy after an average 5-7 years (which does not apply to every patient).    Options include:     Neuravive (MRI guided high intensity focused ultrasound (MRIgFUS) (FUS)    Gamma Knife    Deep Brain Stimulation (DBS)               MEDICAL/SURGICAL COMORBIDITIES     All relevant medical comorbidities noted and managed by primary care physician and medical care team.          HEALTHY LIFESTYLE AND PREVENTATIVE CARE    The patient to adhere to the age-appropriate health maintenance guidelines including screening tests and vaccinations. The patient to adhere to  healthy lifestyle, optimal weight, exercise, healthy diet, good sleep hygiene and avoiding drugs including smoking, alcohol and recreational drugs.      RTC in 6 months         Herberth Christy MD, FAAN    Attending Neurologist/Epileptologist         Diplomate, American Board of Psychiatry and Neurology    Diplomate, American Board of Clinical Neurophysiology     Fellow, American Academy of Neurology         I spent a  total of 45 minutes on the day of the visit.  This includes face to face time and non-face to face time preparing to see the patient (eg, review of tests), obtaining and/or reviewing separately obtained history, documenting clinical information in the electronic or other health record, independently interpreting results and communicating results to the patient/family/caregiver, or care coordinator.

## 2023-09-05 ENCOUNTER — PATIENT MESSAGE (OUTPATIENT)
Dept: ADMINISTRATIVE | Facility: CLINIC | Age: 83
End: 2023-09-05
Payer: MEDICARE

## 2023-09-11 ENCOUNTER — TELEPHONE (OUTPATIENT)
Dept: NEUROLOGY | Facility: CLINIC | Age: 83
End: 2023-09-11
Payer: MEDICARE

## 2023-09-11 NOTE — TELEPHONE ENCOUNTER
Patient daughter Mrs. Bose wanted to report some new symptoms her mother was experiencing. Patient was confused and having hallucinations. They lasted until the next day but after that they went away. The daughter thinks she was taking her PD medication incorrectly (pramipexole). She just wanted to keep you inform but her mother is doing better. I advised her just to watch out if her symptoms come back.

## 2023-09-11 NOTE — TELEPHONE ENCOUNTER
----- Message from Belen Irving sent at 9/11/2023  8:39 AM CDT -----  Contact: daughter [phone]  Patients daughter called in this morning stating the patient is experiencing new symptoms of hallucination and confusion. Please call back [phone]

## 2023-09-28 ENCOUNTER — OFFICE VISIT (OUTPATIENT)
Dept: FAMILY MEDICINE | Facility: CLINIC | Age: 83
End: 2023-09-28
Payer: MEDICARE

## 2023-09-28 VITALS
SYSTOLIC BLOOD PRESSURE: 112 MMHG | TEMPERATURE: 99 F | HEIGHT: 66 IN | DIASTOLIC BLOOD PRESSURE: 66 MMHG | WEIGHT: 130 LBS | HEART RATE: 75 BPM | BODY MASS INDEX: 20.89 KG/M2

## 2023-09-28 DIAGNOSIS — I10 ESSENTIAL HYPERTENSION: ICD-10-CM

## 2023-09-28 DIAGNOSIS — E78.5 HYPERLIPIDEMIA ASSOCIATED WITH TYPE 2 DIABETES MELLITUS: ICD-10-CM

## 2023-09-28 DIAGNOSIS — M85.89 OSTEOPENIA OF MULTIPLE SITES: ICD-10-CM

## 2023-09-28 DIAGNOSIS — E78.5 TYPE 2 DIABETES MELLITUS WITH HYPERLIPIDEMIA: ICD-10-CM

## 2023-09-28 DIAGNOSIS — Z23 INFLUENZA VACCINE NEEDED: ICD-10-CM

## 2023-09-28 DIAGNOSIS — D53.9 NUTRITIONAL ANEMIA, UNSPECIFIED: ICD-10-CM

## 2023-09-28 DIAGNOSIS — K58.0 IRRITABLE BOWEL SYNDROME WITH DIARRHEA: ICD-10-CM

## 2023-09-28 DIAGNOSIS — I25.10 CORONARY ARTERY DISEASE INVOLVING NATIVE CORONARY ARTERY OF NATIVE HEART WITHOUT ANGINA PECTORIS: ICD-10-CM

## 2023-09-28 DIAGNOSIS — G20.A1 IDIOPATHIC PARKINSON'S DISEASE: Primary | ICD-10-CM

## 2023-09-28 DIAGNOSIS — G43.009 MIGRAINE WITHOUT AURA AND WITHOUT STATUS MIGRAINOSUS, NOT INTRACTABLE: ICD-10-CM

## 2023-09-28 DIAGNOSIS — M85.9 DISORDER OF BONE DENSITY AND STRUCTURE, UNSPECIFIED: ICD-10-CM

## 2023-09-28 DIAGNOSIS — Z78.0 ASYMPTOMATIC AGE-RELATED POSTMENOPAUSAL STATE: ICD-10-CM

## 2023-09-28 DIAGNOSIS — E11.69 HYPERLIPIDEMIA ASSOCIATED WITH TYPE 2 DIABETES MELLITUS: ICD-10-CM

## 2023-09-28 DIAGNOSIS — I50.20 HFREF (HEART FAILURE WITH REDUCED EJECTION FRACTION): ICD-10-CM

## 2023-09-28 DIAGNOSIS — D64.9 ANEMIA, UNSPECIFIED TYPE: ICD-10-CM

## 2023-09-28 DIAGNOSIS — E11.69 TYPE 2 DIABETES MELLITUS WITH HYPERLIPIDEMIA: ICD-10-CM

## 2023-09-28 PROCEDURE — 99213 OFFICE O/P EST LOW 20 MIN: CPT | Mod: PBBFAC,PO | Performed by: INTERNAL MEDICINE

## 2023-09-28 PROCEDURE — 99214 OFFICE O/P EST MOD 30 MIN: CPT | Mod: S$PBB,,, | Performed by: INTERNAL MEDICINE

## 2023-09-28 PROCEDURE — 99999 PR PBB SHADOW E&M-EST. PATIENT-LVL III: CPT | Mod: PBBFAC,,, | Performed by: INTERNAL MEDICINE

## 2023-09-28 PROCEDURE — 99214 PR OFFICE/OUTPT VISIT, EST, LEVL IV, 30-39 MIN: ICD-10-PCS | Mod: S$PBB,,, | Performed by: INTERNAL MEDICINE

## 2023-09-28 PROCEDURE — 99999 PR PBB SHADOW E&M-EST. PATIENT-LVL III: ICD-10-PCS | Mod: PBBFAC,,, | Performed by: INTERNAL MEDICINE

## 2023-09-28 NOTE — PROGRESS NOTES
Assessment/Plan:    Problem List Items Addressed This Visit          Neuro    Idiopathic Parkinson's disease - Primary    Overview     -managed by neurology  -did not tolerate carbidopa-levodopa  -currently on mirapex with recent dosage increase         Migraine without aura and without status migrainosus, not intractable    Overview     -hx of chronic migraines  -currently on elavil 50 mg nightly with prevention of headaches  -tolerating medication without adverse effects            Cardiac/Vascular    Coronary artery disease    Overview     -hx of STEMI in 2015 and PCI with DIANA to LAD at that time  -remains on ASA/statin. Still on plavix as well but unsure as to why.   -followed by [facility], Dr. Henning; due for follow up soon  -denies symptoms of angina or dyspnea         Essential hypertension    Overview     Hypertension Medications               furosemide (LASIX) 20 MG tablet Take 20 mg by mouth daily as needed.    metoprolol succinate (TOPROL-XL) 100 MG 24 hr tablet Take 50 mg by mouth once daily.    -at goal today  -continue lifestyle modification with low sodium diet and exercise   -discussed hypertension disease course and importance of treating high blood pressure  -patient understood and advised of risk of untreated blood pressure.  ER precautions were given   for symptoms of hypertensive urgency and emergency.         Relevant Orders    Renal Function Panel    HFrEF (heart failure with reduced ejection fraction)    Overview     -Echo: 5/27/15 post-anterior infarct:  Ejection Fraction = 35-40%.  -Repeat echo December 2015:Normal LV function  -Aug echo 2018: EF 55-60%, mild diastolic dysfunction  -compensated on exam         Relevant Orders    Vitamin D    Hyperlipidemia associated with type 2 diabetes mellitus    Overview     Hyperlipidemia Medications               atorvastatin (LIPITOR) 80 MG tablet Take 80 mg by mouth.    colestipoL (COLESTID) 1 gram Tab Take 1 tablet (1 g total) by mouth 2  (two) times daily.   -chronic condition. Currently stable.    -reports compliance with hyperlipidemia treatment as prescribed  -denies any known adverse effects of medications  -most recent labs listed below; due for updated labs  Lab Results   Component Value Date    CHOL 159 04/29/2022     Lab Results   Component Value Date    HDL 72 04/29/2022     Lab Results   Component Value Date    LDLCALC 67.4 04/29/2022     Lab Results   Component Value Date    TRIG 98 04/29/2022     Lab Results   Component Value Date    ALT 9 (L) 03/29/2023    AST 16 03/29/2023    ALKPHOS 69 03/29/2023    BILITOT 0.3 03/29/2023               Endocrine    Type 2 diabetes mellitus with hyperlipidemia    Overview     Diabetes Medications               metFORMIN (GLUCOPHAGE) 500 MG tablet Take 1 tablet (500 mg total) by mouth 2 (two) times daily.   -condition is currently controlled  -hx of AE with Farxiga (yeast)  -see diabetic health maintenance listed below  -on statin: Yes  -on ACE-I/ARB: No  -counseling provided on importance of diabetic diet and medication compliance in order to treat diabetes  -discussed diabetes disease course and potential complications           Relevant Orders    Vitamin D       GI    IBS (irritable bowel syndrome)    Overview     -chronic hx of IBS with diarrhea  -evaluated by GI and colorectal in the past  -symptoms controlled with BID Colestid            Orthopedic    Osteopenia of multiple sites    Overview     -last DEXA 10/2020- osteopenia  -due for updated DEXA          Other Visit Diagnoses       Influenza vaccine needed        Relevant Orders    Influenza - Quadrivalent (Adjuvanted)    Anemia, unspecified type        Relevant Orders    CBC Without Differential    Iron and TIBC    Ferritin    Vitamin B12    Folate    Nutritional anemia, unspecified        Relevant Orders    Vitamin B12    Folate    Asymptomatic age-related postmenopausal state        Relevant Orders    DXA Bone Density Axial Skeleton 1 or  more sites    Vitamin D    Disorder of bone density and structure, unspecified        Relevant Orders    Vitamin D            Follow up for DEXA, labs:cbc,renal fx,a1c,iron,ferritin,b12,folate,vit D;possible to do nurse visit for flu shot?.    Padmini Boucher MD  _____________________________________________________________________________________________________________________________________________________    CC: follow up of chronic medical conditions     HPI:    Patient is in clinic today as an established patient.    HTN/HFpEF: This condition is chronic and stable. The patient is tolerating their medication well with good compliance.  Denies any adverse effects of medications.  Counseling was offered regarding low sodium diet.  The patient has a reduced salt intake. Routine exercise recommended. The patient denies headache, vision changes, chest pain, palpitations, shortness of breath, or lower extremity edema. Due for cardiology follow up next month.     Parkinson's: presented earlier this year to one of my partners with complaints of hand tremor. Referred to neurology and has since been diagnosed with parkinson's. She was started on carbidopa-levodopa initially but experienced nausea/vomiting. She is currently on mirapex but recently had an increase in dosage recently. Reports that she is tolerating medication thus far.    DM2: Patient presents for follow up of diabetes. Condition is chronic and stable. Patient denies symptoms, including foot ulcerations, hyperglycemia, hypoglycemia , nausea, paresthesia of the feet, polydipsia, polyuria and visual disturbances.  Evaluation to date has been included: fasting blood sugar, fasting lipid panel, hemoglobin A1C and microalbuminuria. Denies adverse effects of current medications.     Diabetes Management Status    Statin: Taking  ACE/ARB: Not taking    Screening or Prevention Patient's value Goal Complete/Controlled?   HgA1C Testing and Control   Lab Results    Component Value Date    HGBA1C 6.4 (H) 05/04/2023      Annually/Less than 8% Yes   Lipid profile : 04/29/2022 Annually No   LDL control Lab Results   Component Value Date    LDLCALC 67.4 04/29/2022    Annually/Less than 100 mg/dl  No   Nephropathy screening Lab Results   Component Value Date    LABMICR 35.0 01/12/2023     Lab Results   Component Value Date    PROTEINUA SEE COMMENT 06/19/2023    Annually Yes   Blood pressure BP Readings from Last 1 Encounters:   09/28/23 112/66    Less than 140/90 Yes   Dilated retinal exam : 09/30/2022 Annually Yes   Foot exam   Most Recent Foot Exam Date: Not Found Annually Yes       No other new complaints today.  Remaining chronic conditions have been reviewed and remain stable. Further detail as stated above.     HM reviewed. Labs and DEXA ordered. Plans to get flu shot when she comes for labs.    No recent changes to medical/surgical history.    Current Outpatient Medications on File Prior to Visit   Medication Sig Dispense Refill    ALPRAZolam (XANAX) 0.25 MG tablet TAKE 1 TABLET BY MOUTH 4 TIMES DAILY AS NEEDED 90 tablet 5    amitriptyline (ELAVIL) 50 MG tablet TAKE 1 TABLET BY MOUTH EVERY EVENING 90 tablet 3    aspirin (ECOTRIN) 81 MG EC tablet Take 81 mg by mouth once daily.      atorvastatin (LIPITOR) 80 MG tablet Take 80 mg by mouth.      blood sugar diagnostic (CONTOUR TEST STRIPS) Strp TEST BLOOD GLUCOSE ONCE DAILY 100 each 11    clopidogrel (PLAVIX) 75 mg tablet Take 75 mg by mouth once daily.      colestipoL (COLESTID) 1 gram Tab Take 1 tablet (1 g total) by mouth 2 (two) times daily. 180 tablet 3    fluorometholone 0.1% (FML) 0.1 % DrpS Place 1 drop into both eyes 4 (four) times daily.      furosemide (LASIX) 20 MG tablet Take 20 mg by mouth daily as needed.      lancets (MICROLET LANCET) Misc TEST BLOOD GLUCOSE ONCE DAILY 100 each 3    lifitegrast (XIIDRA) 5 % Dpet Place 1 drop into both eyes 2 (two) times daily.      metFORMIN (GLUCOPHAGE) 500 MG tablet Take 1  "tablet (500 mg total) by mouth 2 (two) times daily. 180 tablet 3    metoprolol succinate (TOPROL-XL) 100 MG 24 hr tablet Take 50 mg by mouth once daily.       nystatin (MYCOSTATIN) powder Apply topically 4 (four) times daily. 60 g 0    pramipexole (MIRAPEX) 0.25 MG tablet Take 1 tablet (0.25 mg total) by mouth 3 (three) times daily. 270 tablet 3     No current facility-administered medications on file prior to visit.       Review of Systems   Constitutional:  Negative for chills, diaphoresis, fatigue and fever.   HENT:  Negative for congestion, ear pain, postnasal drip, sinus pain and sore throat.    Eyes:  Negative for pain and redness.   Respiratory:  Negative for cough, chest tightness and shortness of breath.    Cardiovascular:  Negative for chest pain and leg swelling.   Gastrointestinal:  Negative for abdominal pain, constipation, diarrhea, nausea and vomiting.   Genitourinary:  Negative for dysuria and hematuria.   Musculoskeletal:  Negative for arthralgias and joint swelling.   Skin:  Negative for rash.   Neurological:  Negative for dizziness, syncope and headaches.   Psychiatric/Behavioral:  Negative for dysphoric mood. The patient is not nervous/anxious.        Vitals:    09/28/23 1358   BP: 112/66   Pulse: 75   Temp: 98.6 °F (37 °C)   Weight: 59 kg (130 lb)   Height: 5' 6" (1.676 m)       Wt Readings from Last 3 Encounters:   09/28/23 59 kg (130 lb)   08/21/23 61.7 kg (136 lb)   06/19/23 61.6 kg (135 lb 11.2 oz)       Physical Exam  Constitutional:       General: She is not in acute distress.     Appearance: Normal appearance. She is well-developed.   HENT:      Head: Normocephalic and atraumatic.   Eyes:      Conjunctiva/sclera: Conjunctivae normal.   Cardiovascular:      Rate and Rhythm: Normal rate and regular rhythm.      Pulses: Normal pulses.      Heart sounds: Normal heart sounds. No murmur heard.  Pulmonary:      Effort: Pulmonary effort is normal. No respiratory distress.      Breath sounds: " Normal breath sounds.   Abdominal:      General: Bowel sounds are normal. There is no distension.      Palpations: Abdomen is soft.      Tenderness: There is no abdominal tenderness.   Musculoskeletal:         General: Normal range of motion.      Cervical back: Normal range of motion and neck supple.   Skin:     General: Skin is warm and dry.      Findings: No rash.   Neurological:      General: No focal deficit present.      Mental Status: She is alert and oriented to person, place, and time.   Psychiatric:         Mood and Affect: Mood normal.         Behavior: Behavior normal.       Health Maintenance   Topic Date Due    TETANUS VACCINE  Never done    Shingles Vaccine (1 of 2) Never done    Lipid Panel  04/29/2023    Eye Exam  09/30/2023    DEXA Scan  10/30/2023    Hemoglobin A1c  11/04/2023    Aspirin/Antiplatelet Therapy  09/28/2024

## 2023-10-10 ENCOUNTER — HOSPITAL ENCOUNTER (OUTPATIENT)
Dept: RADIOLOGY | Facility: HOSPITAL | Age: 83
Discharge: HOME | End: 2023-10-10
Attending: INTERNAL MEDICINE
Payer: MEDICARE

## 2023-10-10 ENCOUNTER — IMMUNIZATION (OUTPATIENT)
Dept: FAMILY MEDICINE | Facility: CLINIC | Age: 83
End: 2023-10-10
Payer: MEDICARE

## 2023-10-10 DIAGNOSIS — Z78.0 ASYMPTOMATIC AGE-RELATED POSTMENOPAUSAL STATE: ICD-10-CM

## 2023-10-10 DIAGNOSIS — Z23 INFLUENZA VACCINE NEEDED: ICD-10-CM

## 2023-10-10 PROCEDURE — G0008 ADMIN INFLUENZA VIRUS VAC: HCPCS | Mod: PBBFAC,PO

## 2023-10-10 PROCEDURE — 99999PBSHW FLU VACCINE - QUADRIVALENT - ADJUVANTED: ICD-10-PCS | Mod: PBBFAC,,,

## 2023-10-10 PROCEDURE — 99999PBSHW FLU VACCINE - QUADRIVALENT - ADJUVANTED: Mod: PBBFAC,,,

## 2023-10-10 NOTE — PROGRESS NOTES
Pt arrived in good spirits, no visible or reported signs of distress or discomfort. AAOX3. Agreed to vaccine administration. Site was clean and band aid was applied after administration. Pt left to wait in lobby for 15 min to rule out adverse reaction. Pt verbalized understanding of after vaccine care.

## 2023-11-16 DIAGNOSIS — E78.5 TYPE 2 DIABETES MELLITUS WITH HYPERLIPIDEMIA: Primary | ICD-10-CM

## 2023-11-16 DIAGNOSIS — N28.9 RENAL INSUFFICIENCY: ICD-10-CM

## 2023-11-16 DIAGNOSIS — E11.69 TYPE 2 DIABETES MELLITUS WITH HYPERLIPIDEMIA: Primary | ICD-10-CM

## 2023-11-16 RX ORDER — METFORMIN HYDROCHLORIDE 500 MG/1
500 TABLET ORAL 2 TIMES DAILY
Qty: 180 TABLET | Refills: 0 | Status: SHIPPED | OUTPATIENT
Start: 2023-11-16 | End: 2024-03-20

## 2023-11-16 NOTE — TELEPHONE ENCOUNTER
Care Due:                  Date            Visit Type   Department     Provider  --------------------------------------------------------------------------------                                EP -                              PRIMARY      [facility] FAMILY  Last Visit: 09-      CARE ([facility])   MEDICINE       Padmini Boucher  Next Visit: None Scheduled  None         None Found                                                            Last  Test          Frequency    Reason                     Performed    Due Date  --------------------------------------------------------------------------------    HBA1C.......  6 months...  metFORMIN................  05-   10-    [facility] Embedded Care Due Messages. Reference number: [id number].   11/16/2023 6:27:05 AM CST

## 2023-11-16 NOTE — TELEPHONE ENCOUNTER
Metformin refill sent in but need to check some more labs. Renal function, A1c and UA.    Her last labs showed her kidney function was slightly down. Need to recheck this. She needs to make sure she is drinking plenty of water. She should also avoid all NSAIDs, such as Advil, Aleve, Motrin or any other pain medications except for Tylenol. NSAIDs can damage kidney.    Her vitamin D was also low. She should start vitamin d3 2000 units daily. This is OTC.    Remainder of labs stable.

## 2023-11-16 NOTE — TELEPHONE ENCOUNTER
Refill Routing Note   Medication(s) are not appropriate for processing by [facility] for the following reason(s):        Required labs outdated    [facility] action(s):  Defer     Requires labs : Yes             Appointments  past 12m or future 3m with PCP    Date Provider   Last Visit   9/28/2023 Padmini Boucher MD   Next Visit   Visit date not found Padmini Boucher MD   ED visits in past 90 days: 0        Note composed:6:40 AM 11/16/2023

## 2023-12-18 ENCOUNTER — TELEPHONE (OUTPATIENT)
Dept: FAMILY MEDICINE | Facility: CLINIC | Age: 83
End: 2023-12-18
Payer: MEDICARE

## 2023-12-18 ENCOUNTER — OFFICE VISIT (OUTPATIENT)
Dept: FAMILY MEDICINE | Facility: CLINIC | Age: 83
End: 2023-12-18
Payer: MEDICARE

## 2023-12-18 VITALS
WEIGHT: 131.38 LBS | HEART RATE: 79 BPM | BODY MASS INDEX: 21.21 KG/M2 | OXYGEN SATURATION: 99 % | DIASTOLIC BLOOD PRESSURE: 70 MMHG | SYSTOLIC BLOOD PRESSURE: 139 MMHG

## 2023-12-18 DIAGNOSIS — R30.0 DYSURIA: ICD-10-CM

## 2023-12-18 DIAGNOSIS — N39.0 URINARY TRACT INFECTION WITHOUT HEMATURIA, SITE UNSPECIFIED: Primary | ICD-10-CM

## 2023-12-18 LAB
BACTERIA #/AREA URNS HPF: ABNORMAL /HPF
BILIRUB UR QL STRIP: ABNORMAL
CLARITY UR: ABNORMAL
COLOR UR: ABNORMAL
GLUCOSE UR QL STRIP: ABNORMAL
HGB UR QL STRIP: ABNORMAL
KETONES UR QL STRIP: ABNORMAL
LEUKOCYTE ESTERASE UR QL STRIP: ABNORMAL
MICROSCOPIC COMMENT: ABNORMAL
NITRITE UR QL STRIP: ABNORMAL
PH UR STRIP: ABNORMAL [PH] (ref 5–8)
PROT UR QL STRIP: ABNORMAL
RBC #/AREA URNS HPF: 3 /HPF (ref 0–4)
SP GR UR STRIP: ABNORMAL (ref 1–1.03)
SQUAMOUS #/AREA URNS HPF: 10 /HPF
URN SPEC COLLECT METH UR: ABNORMAL
WBC #/AREA URNS HPF: >100 /HPF (ref 0–5)
WBC CLUMPS URNS QL MICRO: ABNORMAL

## 2023-12-18 PROCEDURE — 99999 PR PBB SHADOW E&M-EST. PATIENT-LVL IV: CPT | Mod: PBBFAC,,, | Performed by: NURSE PRACTITIONER

## 2023-12-18 PROCEDURE — 99213 OFFICE O/P EST LOW 20 MIN: CPT | Mod: S$PBB,,, | Performed by: NURSE PRACTITIONER

## 2023-12-18 PROCEDURE — 87186 SC STD MICRODIL/AGAR DIL: CPT | Performed by: NURSE PRACTITIONER

## 2023-12-18 PROCEDURE — 99214 OFFICE O/P EST MOD 30 MIN: CPT | Mod: PBBFAC,PO | Performed by: NURSE PRACTITIONER

## 2023-12-18 PROCEDURE — 99213 PR OFFICE/OUTPT VISIT, EST, LEVL III, 20-29 MIN: ICD-10-PCS | Mod: S$PBB,,, | Performed by: NURSE PRACTITIONER

## 2023-12-18 PROCEDURE — 87088 URINE BACTERIA CULTURE: CPT | Performed by: NURSE PRACTITIONER

## 2023-12-18 PROCEDURE — 87077 CULTURE AEROBIC IDENTIFY: CPT | Performed by: NURSE PRACTITIONER

## 2023-12-18 PROCEDURE — 99999 PR PBB SHADOW E&M-EST. PATIENT-LVL IV: ICD-10-PCS | Mod: PBBFAC,,, | Performed by: NURSE PRACTITIONER

## 2023-12-18 PROCEDURE — 81000 URINALYSIS NONAUTO W/SCOPE: CPT | Mod: PO | Performed by: NURSE PRACTITIONER

## 2023-12-18 PROCEDURE — 87086 URINE CULTURE/COLONY COUNT: CPT | Performed by: NURSE PRACTITIONER

## 2023-12-18 RX ORDER — NITROFURANTOIN 25; 75 MG/1; MG/1
100 CAPSULE ORAL 2 TIMES DAILY
Qty: 20 CAPSULE | Refills: 0 | Status: SHIPPED | OUTPATIENT
Start: 2023-12-18 | End: 2024-01-31 | Stop reason: SDUPTHER

## 2023-12-18 NOTE — PROGRESS NOTES
Assessment/Plan:  Problem List Items Addressed This Visit    None  Visit Diagnoses       Urinary tract infection without hematuria, site unspecified    -  Primary    Relevant Medications    nitrofurantoin, macrocrystal-monohydrate, (MACROBID) 100 MG capsule    Dysuria        Relevant Orders    Urinalysis, Reflex to Urine Culture Urine, Clean Catch (Completed)          U/A positive for infection  Start ABX as prescribed; await culture   Increase hydration with water   Follow up if symptoms worsen or fail to improve.  ER precautions for severe or worsening symptoms.     Saige Keenan, JHON  _____________________________________________________________________________________________________________________________________________________    CC: uti     HPI: Patient is an 83-year-old female who presents in clinic today as an established patient here for uti. This is a new problem. The current episode started x3. The problem occurs every urination. The problem has been unchanged. The quality of the pain is described as burning and aching. The pain is mild. There has been no fever. There is no history of pyelonephritis. Associated symptoms include frequency and urgency. Pertinent negatives include no behavior changes, flank pain, chills, discharge, hematuria, hesitancy, nausea, sweats, vomiting, weight loss, bubble bath use, constipation, rash or withholding. She has tried nothing for the symptoms. There is no history of catheterization, urinary reflux, kidney stones, recurrent UTIs, a single kidney, STD, urinary stasis or a urological procedure.     Past Medical History:  Past Medical History:   Diagnosis Date    Allergy     Anxiety     Asthma     Coronary artery disease 10/18/2018    DM (diabetes mellitus)     Essential hypertension 10/21/2020    IBS (irritable bowel syndrome)     Migraine headache     Mitral valve prolapse     Type 2 diabetes mellitus with hyperlipidemia 9/6/2016     Past Surgical History:   Procedure  Laterality Date    BREAST BIOPSY      BREAST LUMPECTOMY      HYSTERECTOMY      partial    IN COLONOSCOPY,REMV LIZET,FORCEP/CAUTERY  8/23/2012          Review of patient's allergies indicates:   Allergen Reactions    Codeine      Other reaction(s): Unknown  unknown    Diclofenac Nausea And Vomiting    Doxycycline      Other reaction(s): Unknown  unknown    Iodinated contrast media Hives     Pt reports rxn to iv dye years ago,but no rxn to oral contrast    Sulfa (sulfonamide antibiotics)      Other reaction(s): Unknown  unknown     Social History     Tobacco Use    Smoking status: Never    Smokeless tobacco: Never   Substance Use Topics    Alcohol use: No    Drug use: No     Family History   Problem Relation Age of Onset    Heart disease Mother     Diabetes Brother     Heart disease Brother     Early death Brother     Breast cancer Maternal Aunt      Current Outpatient Medications on File Prior to Visit   Medication Sig Dispense Refill    ALPRAZolam (XANAX) 0.25 MG tablet TAKE 1 TABLET BY MOUTH 4 TIMES DAILY AS NEEDED 90 tablet 5    amitriptyline (ELAVIL) 50 MG tablet TAKE 1 TABLET BY MOUTH EVERY EVENING 90 tablet 3    aspirin (ECOTRIN) 81 MG EC tablet Take 81 mg by mouth once daily.      atorvastatin (LIPITOR) 80 MG tablet Take 80 mg by mouth.      blood sugar diagnostic (CONTOUR TEST STRIPS) Strp TEST BLOOD GLUCOSE ONCE DAILY 100 each 11    clopidogrel (PLAVIX) 75 mg tablet Take 75 mg by mouth once daily.      colestipoL (COLESTID) 1 gram Tab Take 1 tablet (1 g total) by mouth 2 (two) times daily. 180 tablet 3    fluorometholone 0.1% (FML) 0.1 % DrpS Place 1 drop into both eyes 4 (four) times daily.      furosemide (LASIX) 20 MG tablet Take 20 mg by mouth daily as needed.      lancets (MICROLET LANCET) Misc TEST BLOOD GLUCOSE ONCE DAILY 100 each 3    lifitegrast (XIIDRA) 5 % Dpet Place 1 drop into both eyes 2 (two) times daily.      metFORMIN (GLUCOPHAGE) 500 MG tablet TAKE 1 TABLET BY MOUTH TWICE DAILY 180 tablet 0     metoprolol succinate (TOPROL-XL) 100 MG 24 hr tablet Take 50 mg by mouth once daily.       nystatin (MYCOSTATIN) powder Apply topically 4 (four) times daily. 60 g 0    pramipexole (MIRAPEX) 0.25 MG tablet Take 1 tablet (0.25 mg total) by mouth 3 (three) times daily. 270 tablet 3     No current facility-administered medications on file prior to visit.     Review of Systems   Constitutional:  Negative for appetite change, chills, fatigue and fever.   HENT:  Negative for congestion, rhinorrhea and sore throat.    Eyes:  Negative for visual disturbance.   Respiratory:  Negative for cough and shortness of breath.    Cardiovascular:  Negative for chest pain, palpitations and leg swelling.   Gastrointestinal:  Negative for abdominal pain, diarrhea and vomiting.   Genitourinary:  Positive for dysuria, frequency and urgency. Negative for difficulty urinating and hematuria.   Musculoskeletal:  Negative for arthralgias and myalgias.   Skin:  Negative for rash and wound.   Neurological:  Negative for dizziness and headaches.   Psychiatric/Behavioral:  Negative for behavioral problems. The patient is not nervous/anxious.      Vitals:    12/18/23 1133   BP: 139/70   Pulse: 79   SpO2: 99%   Weight: 59.6 kg (131 lb 6.4 oz)     Wt Readings from Last 3 Encounters:   12/18/23 59.6 kg (131 lb 6.4 oz)   09/28/23 59 kg (130 lb)   08/21/23 61.7 kg (136 lb)     Physical Exam  Vitals reviewed.   Constitutional:       General: She is not in acute distress.     Appearance: Normal appearance. She is not ill-appearing.   HENT:      Head: Normocephalic and atraumatic.      Right Ear: External ear normal.      Left Ear: External ear normal.      Nose: Nose normal.   Eyes:      Extraocular Movements: Extraocular movements intact.      Conjunctiva/sclera: Conjunctivae normal.   Cardiovascular:      Rate and Rhythm: Normal rate.      Heart sounds: Normal heart sounds.   Pulmonary:      Effort: Pulmonary effort is normal. No respiratory distress.       Breath sounds: Normal breath sounds.   Abdominal:      General: Abdomen is flat. There is no distension.   Musculoskeletal:         General: Normal range of motion.      Cervical back: Normal range of motion.   Skin:     General: Skin is warm and dry.      Capillary Refill: Capillary refill takes less than 2 seconds.      Coloration: Skin is not pale.      Findings: No rash.   Neurological:      General: No focal deficit present.      Mental Status: She is alert and oriented to person, place, and time. Mental status is at baseline.      Motor: Tremor present.   Psychiatric:         Mood and Affect: Mood normal.         Speech: Speech normal. Speech is not rapid and pressured, delayed or slurred.         Behavior: Behavior normal. Behavior is not agitated, slowed, aggressive, withdrawn, hyperactive or combative. Behavior is cooperative.         Thought Content: Thought content normal.         Judgment: Judgment normal.       Health Maintenance   Topic Date Due    TETANUS VACCINE  Never done    Shingles Vaccine (1 of 2) Never done    Lipid Panel  04/29/2023    Eye Exam  09/30/2023    DEXA Scan  10/30/2023    Hemoglobin A1c  11/04/2023    Aspirin/Antiplatelet Therapy  12/18/2024

## 2023-12-18 NOTE — TELEPHONE ENCOUNTER
I spoke with the patient about this. Pt offered an appointment. Pt unable to complete e-visit or virtual. Pt states she will be going to the walk-in clinic for evaluation and recommendations

## 2023-12-18 NOTE — TELEPHONE ENCOUNTER
----- Message from Estelle Jeong sent at 12/18/2023  6:40 AM CST -----  States she would like to get a urine specimen done today. She thinks she has a bladder infection. States she will be leaving this afternoon, to go out of town. Please call pt  [phone]. Thank you

## 2023-12-18 NOTE — PATIENT INSTRUCTIONS
Ronaldo Chi,     If you are due for any health screening(s) below please notify me so we can arrange them to be ordered and scheduled. Most healthy patients at your age complete them, but you are free to accept or refuse.     If you can't do it, I'll definitely understand. If you can, I'd certainly appreciate it!    All of your core healthy metrics are met.      Your diabetic retinal eye exam is due     Diabetes is the #1 cause of blindness in the [place] - early detection before signs or symptoms develop can prevent debilitating blindness.     Our records indicate that you may be overdue for your annual diabetic eye exam. Eye screening can help identify patients at risk for developing vision loss which is common in diabetes. This simple screening is an important step to keeping you healthy and preventing complications from diabetes.     This recommended diabetic eye exam should take place once per year and can prevent and treat diabetes complications in the eye before developing symptoms. This can be done with a special camera is used to take photographs of the back of your eye without having to dilate them, or you can see an eye doctor for a full dilated exam.     If you recently had your yearly diabetic eye exam performed outside of [facility], please let your Health care team know so that they can update your health record.

## 2023-12-20 LAB — BACTERIA UR CULT: ABNORMAL

## 2024-01-08 NOTE — TELEPHONE ENCOUNTER
No care due was identified.  Health [facility] Embedded Care Due Messages. Reference number: [id number].   1/08/2024 8:31:40 AM CST

## 2024-01-09 RX ORDER — AMITRIPTYLINE HYDROCHLORIDE 50 MG/1
TABLET, FILM COATED ORAL
Qty: 90 TABLET | Refills: 2 | Status: SHIPPED | OUTPATIENT
Start: 2024-01-09

## 2024-01-09 NOTE — TELEPHONE ENCOUNTER
Refill Decision Note   Alon Chatman  is requesting a refill authorization.  Brief Assessment and Rationale for Refill:  Approve     Medication Therapy Plan:         Comments:     Note composed:4:25 AM 01/09/2024

## 2024-01-12 ENCOUNTER — TELEPHONE (OUTPATIENT)
Dept: FAMILY MEDICINE | Facility: CLINIC | Age: 84
End: 2024-01-12
Payer: MEDICARE

## 2024-01-12 NOTE — TELEPHONE ENCOUNTER
----- Message from Smiley Sexton sent at 1/12/2024  7:16 AM CST -----  Contact: pt  Pt is calling in regard to maybe having a bladder infection and need to see if she can get a specimen today to check it out.  Please call her back at [phone]  veto/rosd

## 2024-01-18 ENCOUNTER — OFFICE VISIT (OUTPATIENT)
Dept: NEUROLOGY | Facility: CLINIC | Age: 84
End: 2024-01-18
Payer: MEDICARE

## 2024-01-18 VITALS
SYSTOLIC BLOOD PRESSURE: 122 MMHG | HEIGHT: 66 IN | HEART RATE: 64 BPM | BODY MASS INDEX: 20.89 KG/M2 | OXYGEN SATURATION: 99 % | RESPIRATION RATE: 16 BRPM | DIASTOLIC BLOOD PRESSURE: 70 MMHG | WEIGHT: 130 LBS

## 2024-01-18 DIAGNOSIS — G43.009 MIGRAINE WITHOUT AURA AND WITHOUT STATUS MIGRAINOSUS, NOT INTRACTABLE: ICD-10-CM

## 2024-01-18 DIAGNOSIS — I25.10 CORONARY ARTERY DISEASE INVOLVING NATIVE CORONARY ARTERY OF NATIVE HEART WITHOUT ANGINA PECTORIS: ICD-10-CM

## 2024-01-18 DIAGNOSIS — I10 ESSENTIAL HYPERTENSION: ICD-10-CM

## 2024-01-18 DIAGNOSIS — M85.89 OSTEOPENIA OF MULTIPLE SITES: ICD-10-CM

## 2024-01-18 DIAGNOSIS — E78.5 TYPE 2 DIABETES MELLITUS WITH HYPERLIPIDEMIA: ICD-10-CM

## 2024-01-18 DIAGNOSIS — F41.9 ANXIETY: ICD-10-CM

## 2024-01-18 DIAGNOSIS — G47.00 INSOMNIA, UNSPECIFIED TYPE: ICD-10-CM

## 2024-01-18 DIAGNOSIS — R29.90 MULTIPLE NEUROLOGICAL SYMPTOMS: Primary | ICD-10-CM

## 2024-01-18 DIAGNOSIS — R00.2 PALPITATIONS: ICD-10-CM

## 2024-01-18 DIAGNOSIS — R25.1 TREMOR: ICD-10-CM

## 2024-01-18 DIAGNOSIS — I50.20 HFREF (HEART FAILURE WITH REDUCED EJECTION FRACTION): ICD-10-CM

## 2024-01-18 DIAGNOSIS — E11.69 HYPERLIPIDEMIA ASSOCIATED WITH TYPE 2 DIABETES MELLITUS: ICD-10-CM

## 2024-01-18 DIAGNOSIS — E11.69 TYPE 2 DIABETES MELLITUS WITH HYPERLIPIDEMIA: ICD-10-CM

## 2024-01-18 DIAGNOSIS — K58.0 IRRITABLE BOWEL SYNDROME WITH DIARRHEA: ICD-10-CM

## 2024-01-18 DIAGNOSIS — G20.A1 IDIOPATHIC PARKINSON'S DISEASE: ICD-10-CM

## 2024-01-18 DIAGNOSIS — R44.1 VISUAL HALLUCINATIONS: ICD-10-CM

## 2024-01-18 DIAGNOSIS — N30.20 CHRONIC CYSTITIS: ICD-10-CM

## 2024-01-18 DIAGNOSIS — E78.5 HYPERLIPIDEMIA ASSOCIATED WITH TYPE 2 DIABETES MELLITUS: ICD-10-CM

## 2024-01-18 PROCEDURE — 99215 OFFICE O/P EST HI 40 MIN: CPT | Mod: S$PBB,,, | Performed by: PSYCHIATRY & NEUROLOGY

## 2024-01-18 PROCEDURE — 99215 OFFICE O/P EST HI 40 MIN: CPT | Mod: PBBFAC | Performed by: PSYCHIATRY & NEUROLOGY

## 2024-01-18 PROCEDURE — 99999 PR PBB SHADOW E&M-EST. PATIENT-LVL V: CPT | Mod: PBBFAC,,, | Performed by: PSYCHIATRY & NEUROLOGY

## 2024-01-18 PROCEDURE — 99417 PROLNG OP E/M EACH 15 MIN: CPT | Mod: S$PBB,,, | Performed by: PSYCHIATRY & NEUROLOGY

## 2024-01-18 RX ORDER — QUETIAPINE FUMARATE 25 MG/1
25 TABLET, FILM COATED ORAL NIGHTLY
Qty: 90 TABLET | Refills: 3 | Status: SHIPPED | OUTPATIENT
Start: 2024-01-18 | End: 2025-01-17

## 2024-01-18 NOTE — PROGRESS NOTES
Subjective:       Patient ID: Alon Chatman is a 83 y.o. female.    Chief Complaint: Idiopathic Parkinson's disease          HPI        BACKGROUND HISTORY       The patient started having tremors in 2022.  The patient's tremors are strictly in the LUE and LLE. No head tremors. No voice tremors. The tremors seem to be mainly at rest and during activity. The patient's gait slowed down. Postural stability is impaired alongside with occasional mild postural lightheadedness. The patient also complains of muscle stiffness. No hallucinations. No delusions. No alteration of mental status.  No language or communication problems. No muscle weakness or twitching. No trouble swallowing. No eye movement difficulty. The patient's voice tone and volume have decreased. In addition, the facial expressions have declined. The handwriting has gotten smaller. No history of TBI. No history of urine incontinence. No history of memory loss. No  drooling and no trouble swallowing. The patient reports reactive depression. Denies spells of sudden laughing but they seem to be unprovoked. No suicidal or homicidal ideations. No antipsychotic or antiemetic meds use for prolonged time. No history of CO poisoning. No family history of similar symptoms. The patient denies history of strokes. No history of occupational toxic exposure. No history of chronic liver disease. Ordered Jackie Scan and started her on CD/LD 25/100 mg QID.I also recommended PT. On 04- Jackie Scan is abnormal and confirms Primary Parkinsonism like Parkinson's disease. . Could not tolerate CD/LD due to N/V. Tolerated Mirapex 0.125 mg TID which's less effective than CD/LD. The patient has enjoyed and benefited tremendously from PT.  Continued Pramipexole (Mirapex) and changed from 0.125 mg TID to 0.25 mg TID.       INTERVAL HISTORY       Accompanied by daughter on 01- for follow up.    Doing well Pramipexole (Mirapex) 0.25 mg TID. Planning on restarting PT. She is  complaining of trouble sleeping despite being on Amitriptyline 50 mg QHS which she used for decades and eradicated her migraines. Daughter reported some instances of VH and it remains unclear if she was doubling her medication (Mirapex).          Review of Systems   Constitutional:  Negative for appetite change and fatigue.   HENT:  Positive for hearing loss. Negative for tinnitus.    Eyes:  Negative for photophobia and visual disturbance.   Respiratory:  Negative for apnea and shortness of breath.    Cardiovascular:  Negative for chest pain and palpitations.   Gastrointestinal:  Negative for nausea and vomiting.   Endocrine: Negative for cold intolerance and heat intolerance.   Genitourinary:  Negative for difficulty urinating and urgency.   Musculoskeletal:  Positive for gait problem. Negative for arthralgias, back pain, joint swelling, myalgias, neck pain and neck stiffness.   Skin:  Negative for color change and rash.   Allergic/Immunologic: Negative for environmental allergies and immunocompromised state.   Neurological:  Positive for tremors and speech difficulty. Negative for dizziness, seizures, syncope, facial asymmetry, weakness, light-headedness, numbness and headaches.   Hematological:  Negative for adenopathy. Does not bruise/bleed easily.   Psychiatric/Behavioral:  Negative for agitation, behavioral problems, confusion, decreased concentration, dysphoric mood, hallucinations, self-injury, sleep disturbance and suicidal ideas. The patient is nervous/anxious. The patient is not hyperactive.                  Current Outpatient Medications:     ALPRAZolam (XANAX) 0.25 MG tablet, TAKE 1 TABLET BY MOUTH 4 TIMES DAILY AS NEEDED, Disp: 90 tablet, Rfl: 5    amitriptyline (ELAVIL) 50 MG tablet, TAKE 1 TABLET BY MOUTH EVERY EVENING, Disp: 90 tablet, Rfl: 2    aspirin (ECOTRIN) 81 MG EC tablet, Take 81 mg by mouth once daily., Disp: , Rfl:     atorvastatin (LIPITOR) 80 MG tablet, Take 80 mg by mouth., Disp: , Rfl:      blood sugar diagnostic (CONTOUR TEST STRIPS) Strp, TEST BLOOD GLUCOSE ONCE DAILY, Disp: 100 each, Rfl: 11    clopidogrel (PLAVIX) 75 mg tablet, Take 75 mg by mouth once daily., Disp: , Rfl:     fluorometholone 0.1% (FML) 0.1 % DrpS, Place 1 drop into both eyes 4 (four) times daily., Disp: , Rfl:     furosemide (LASIX) 20 MG tablet, Take 20 mg by mouth daily as needed., Disp: , Rfl:     lancets (MICROLET LANCET) Misc, TEST BLOOD GLUCOSE ONCE DAILY, Disp: 100 each, Rfl: 3    lifitegrast (XIIDRA) 5 % Dpet, Place 1 drop into both eyes 2 (two) times daily., Disp: , Rfl:     metFORMIN (GLUCOPHAGE) 500 MG tablet, TAKE 1 TABLET BY MOUTH TWICE DAILY, Disp: 180 tablet, Rfl: 0    metoprolol succinate (TOPROL-XL) 100 MG 24 hr tablet, Take 50 mg by mouth once daily. , Disp: , Rfl:     nitrofurantoin, macrocrystal-monohydrate, (MACROBID) 100 MG capsule, Take 1 capsule (100 mg total) by mouth 2 (two) times daily., Disp: 20 capsule, Rfl: 0    nystatin (MYCOSTATIN) powder, Apply topically 4 (four) times daily., Disp: 60 g, Rfl: 0    pramipexole (MIRAPEX) 0.25 MG tablet, Take 1 tablet (0.25 mg total) by mouth 3 (three) times daily., Disp: 270 tablet, Rfl: 3        Past Medical History:   Diagnosis Date    Allergy     Anxiety     Asthma     Coronary artery disease 10/18/2018    DM (diabetes mellitus)     Essential hypertension 10/21/2020    IBS (irritable bowel syndrome)     Migraine headache     Mitral valve prolapse     Type 2 diabetes mellitus with hyperlipidemia 9/6/2016     Past Surgical History:   Procedure Laterality Date    BREAST BIOPSY      BREAST LUMPECTOMY      HYSTERECTOMY      partial    SD COLONOSCOPY,REMV LIZETFORCEP/CAUTERY  8/23/2012          Social History     Socioeconomic History    Marital status:    Tobacco Use    Smoking status: Never    Smokeless tobacco: Never   Substance and Sexual Activity    Alcohol use: No    Drug use: No    Sexual activity: Not Currently             Past/Current  Medical/Surgical History, Past/Current Social History, Past/Current Family History and Past/Current Medications were reviewed in detail.        Objective:           VITAL SIGNS WERE REVIEWED      GENERAL APPEARANCE:     The patient looks comfortable.    BMI 20.98     No signs of respiratory distress.    Normal breathing pattern.    No dysmorphic features    Normal eye contact.       GENERAL MEDICAL EXAM:    HEENT:  Head is atraumatic normocephalic.     FUNDUSCOPIC (OPHTHALMOSCOPIC) EXAMINATION showed no disc edema.      NECK: No JVD. No visible lesions or goiters.     CHEST-CARDIOPULMONARY: No cyanosis. No tachypnea. Normal respiratory effort.    WAPGGMI-[id number]-SEBIKTNAYF: No jaundice. No stomas or lesions. No visible hernias. No catheters.     SKIN, HAIR, NAILS: No pathognomonic skin rash.No neurofibromatosis. No visible lesions.No stigmata of autoimmune disease. No clubbing.    LIMBS: No varicose veins. No visible swelling.    MUSCULOSKELETAL: No visible deformities.No visible lesions.           Neurologic Exam     Mental Status   Oriented to person, place, and time.   Follows 3 step commands.   Attention: normal. Concentration: normal.   Speech: speech is normal   Level of consciousness: alert  Able to name object. Able to repeat. Normal comprehension.     Cranial Nerves   Cranial nerves II through XII intact.     CN II   Visual fields full to confrontation.   Visual acuity: normal  Right visual field deficit: none  Left visual field deficit: none     CN III, IV, VI   Pupils are equal, round, and reactive to light.  Extraocular motions are normal.   Right pupil: Size: 2 mm. Shape: regular. Reactivity: brisk. Consensual response: intact. Accommodation: intact.   Left pupil: Size: 2 mm. Shape: regular. Reactivity: brisk. Consensual response: intact. Accommodation: intact.   CN III: no CN III palsy  CN VI: no CN VI palsy  Nystagmus: none   Diplopia: none  Ophthalmoparesis: none  Upgaze: normal  Downgaze:  normal  Conjugate gaze: present  Vestibulo-ocular reflex: present    CN V   Facial sensation intact.   Right facial sensation deficit: none  Left facial sensation deficit: none  Jaw jerk: normal    CN VII   Facial expression full, symmetric.   Right facial weakness: none  Left facial weakness: none    CN VIII   CN VIII normal.   Hearing: impaired    CN IX, X   CN IX normal.   CN X normal.   Palate: symmetric    CN XI   CN XI normal.   Right sternocleidomastoid strength: normal  Left sternocleidomastoid strength: normal  Right trapezius strength: normal  Left trapezius strength: normal    CN XII   CN XII normal.   Tongue: not atrophic  Fasciculations: absent  Tongue deviation: none    Motor Exam   Muscle bulk: normal  Overall muscle tone: normal  Right arm tone: normal  Left arm tone: normal  Right arm pronator drift: absent  Left arm pronator drift: absent  Right leg tone: normal  Left leg tone: normal    Strength   Strength 5/5 throughout.   Right neck flexion: 5/5  Left neck flexion: 5/5  Right neck extension: 5/5  Left neck extension: 5/5  Right deltoid: 5/5  Left deltoid: 5/5  Right biceps: 5/5  Left biceps: 5/5  Right triceps: 5/5  Left triceps: 5/5  Right wrist flexion: 5/5  Left wrist flexion: 5/5  Right wrist extension: 5/5  Left wrist extension: 5/5  Right interossei: 5/5  Left interossei: 5/5  Right iliopsoas: 5/5  Left iliopsoas: 5/5  Right quadriceps: 5/5  Left quadriceps: 5/5  Right hamstrin/5  Left hamstrin/5  Right glutei: 5/5  Left glutei: 5/5  Right anterior tibial: 5/5  Left anterior tibial: 5/5  Right posterior tibial: 5/5  Left posterior tibial: 5/5  Right peroneal: 5/5  Left peroneal: 5/5  Right gastroc: 5/5  Left gastroc: 5/5    Sensory Exam   Light touch normal.   Right arm light touch: normal  Left arm light touch: normal  Right leg light touch: normal  Left leg light touch: normal  Right arm vibration: normal  Left arm vibration: normal  Right leg vibration: decreased from toes  Left  leg vibration: decreased from toes  Proprioception normal.   Right arm proprioception: normal  Left arm proprioception: normal  Right leg proprioception: normal  Left leg proprioception: normal  Pinprick normal.   Right arm pinprick: normal  Left arm pinprick: normal  Right leg pinprick: normal  Left leg pinprick: normal  Graphesthesia: normal  Stereognosis: normal    Gait, Coordination, and Reflexes     Gait  Gait: normal    Coordination   Romberg: negative  Finger to nose coordination: normal  Heel to shin coordination: normal    Tremor   Resting tremor: present (LUE LLE)  Intention tremor: absent  Action tremor: absent    Reflexes   Right brachioradialis: 2+  Left brachioradialis: 2+  Right biceps: 2+  Left biceps: 2+  Right triceps: 2+  Left triceps: 2+  Right patellar: 2+  Left patellar: 2+  Right achilles: 1+  Left achilles: 1+  Right plantar: normal  Left plantar: normal  Right Olivo: absent  Left Olivo: absent  Right ankle clonus: absent  Left ankle clonus: absent  Right pendular knee jerk: absent  Left pendular knee jerk: absent    EXTRAPYRAMIDAL EXAMINATION FOR PARKINSONISM     KEY: 0 NL, 1 MILD, 2 MODERATE, 3 SEVERE, 4 ADVANCED.       HEAD AND NECK AND CRANIAL NERVES EXAMINATION:       Facial expressions: Mild Hypomimia.     Visual Acuity: Decreased.     Smell: Mild Anosmia.    Voice: Significant Hypophonia.     Swallowing: No Dysphagia or Sialorrhea.       NON-MOTOR EXAMINATION:      Autonomic: Mild Orthostasis    Mood: Reactive Depression    Emotional continence: No PBA.     Cognition: No Amnesia.    Behavior: VH      INVOLUNTARY MOVEMENTS:     No Rest Tremor in the RUE     3 Hz  2/4 Rest Tremor in the LUE.     No Rest Tremor in the RLE     3 Hz 2/4 Rest Tremor in the LLE      TONE EXAMINATION:     No Nuchal Rigidity    No Appendicular Rigidity and Paratonia in the RUE    No Appendicular Rigidity and Paratonia in the LUE.     No appendicular Rigidity or Paratonia in the RLE    No appendicular  Rigidity and Paratonia in the LLE.      BRADYKINESIA EXAMINATION:     Rapid Alternating Movements (Elza) are normal in the RT    Rapid Alternating Movements (Elza) are normal in the LT    Positive micrographia.      STANCE, POSTURAL STABILITY AND GAIT:     The patient was able to stand up with crossed arms without difficulty.    Postural stability by Retropulsion and Propulsion is 1/4 abnormal.     There is evidence of 1/4  gait apraxia, festination/shuffling and gait rigidity.                  Lab Results   Component Value Date    WBC 5.89 10/10/2023    HGB 11.9 (L) 10/10/2023    HCT 38.3 10/10/2023    MCV 96 10/10/2023     10/10/2023     Sodium   Date Value Ref Range Status   10/10/2023 140 136 - 145 mmol/L Final     Potassium   Date Value Ref Range Status   10/10/2023 3.9 3.5 - 5.1 mmol/L Final     Chloride   Date Value Ref Range Status   10/10/2023 106 95 - 110 mmol/L Final     CO2   Date Value Ref Range Status   10/10/2023 22 (L) 23 - 29 mmol/L Final     Glucose   Date Value Ref Range Status   10/10/2023 118 (H) 70 - 110 mg/dL Final     BUN   Date Value Ref Range Status   10/10/2023 16 8 - 23 mg/dL Final     Creatinine   Date Value Ref Range Status   10/10/2023 1.4 0.5 - 1.4 mg/dL Final     Calcium   Date Value Ref Range Status   10/10/2023 9.1 8.7 - 10.5 mg/dL Final     Total Protein   Date Value Ref Range Status   03/29/2023 7.0 6.0 - 8.4 g/dL Final     Albumin   Date Value Ref Range Status   10/10/2023 3.7 3.5 - 5.2 g/dL Final     Total Bilirubin   Date Value Ref Range Status   03/29/2023 0.3 0.1 - 1.0 mg/dL Final     Comment:     For infants and newborns, interpretation of results should be based  on gestational age, weight and in agreement with clinical  observations.    Premature Infant recommended reference ranges:  Up to 24 hours.............<8.0 mg/dL  Up to 48 hours............<12.0 mg/dL  3-5 days..................<15.0 mg/dL  6-29 days.................<15.0 mg/dL       Alkaline Phosphatase    Date Value Ref Range Status   03/29/2023 69 55 - 135 U/L Final     AST   Date Value Ref Range Status   03/29/2023 16 10 - 40 U/L Final     ALT   Date Value Ref Range Status   03/29/2023 9 (L) 10 - 44 U/L Final     Anion Gap   Date Value Ref Range Status   10/10/2023 12 8 - 16 mmol/L Final     eGFR if    Date Value Ref Range Status   04/29/2022 >60.0 >60 mL/min/1.73 m^2 Final     eGFR if non    Date Value Ref Range Status   04/29/2022 53.3 (A) >60 mL/min/1.73 m^2 Final     Comment:     Calculation used to obtain the estimated glomerular filtration  rate (eGFR) is the CKD-EPI equation.          Lab Results   Component Value Date    TSH 3.358 03/29/2023             LABORATORY EVALUATION         [id number]    CBC, CMP, TFT, HA1C only remarkable T2DM         RADIOLOGY EVALUATION         04-    Jackie Scan is abnormal and confirms Primary Parkinsonism like Parkinson's disease.       Reviewed the neuroimaging independently       Assessment:         1. Multiple neurological symptoms    2. Anxiety    3. Chronic cystitis    4. Coronary artery disease involving native coronary artery of native heart without angina pectoris    5. Essential hypertension    6. HFrEF (heart failure with reduced ejection fraction)    7. Hyperlipidemia associated with type 2 diabetes mellitus    8. Irritable bowel syndrome with diarrhea    9. Idiopathic Parkinson's disease    10. Migraine without aura and without status migrainosus, not intractable    11. Osteopenia of multiple sites    12. Palpitations    13. Tremor    14. Type 2 diabetes mellitus with hyperlipidemia            Plan:         IDIOPATHIC PARKINSON'S DISEASE (IPD), LEFT SIDE ONSET 2022          PHYSIOTHERAPY    Continue PT/OT/ST/LSVT    Driving Evaluation down the road. Filled out form for handicap parking.     Falling down precautions.      PHARMACOTHERAPY       Discussed the fact that medications tend to lose efficacy after 5-7 years and DBS  needs to be considered then.    Discussed the fact that PD is progressive disease for which we do not have DMA. Our management is purely symptomatic.       DOPAMINERGIC MEDICATIONS:       Continue Pramipexole (Mirapex) 0.25 mg TID. Maximum dose 1.5 mg PO TID and for her we most likely note exceed 1/2 maximum.       Failed CD/LD due to N/V.      NEXT Options:Ropirinole (Requip), and Rotigotine (Neupro) (TD Patch)      ADJUNCTIVE TREATMENTS     AMANTADINE      If dyskinesia (chorea) emerges will consider Amantadine 100 mg BID as anti-dyskinetic (Anti CD/LD-induced chorea)       COMT INHIBITORS     If ON/OFF periods start to emerge will consider Entacapone (Comtan) 200 mg PO with CD/LD.     Other options: Tolcapone (Tasmar), Opicapone (Ongentys).    MAO INHIBITORS     Seligiline (Zelapar), Rosagiline (Azilect)    The risk of interactions outweigh the marginal benefits.     FREEZING SPELLS     If freezing spells become life altering will consider Apomorphine SL (Kynmobi) 10-30 mg PRN with close monitoring of BP and OR (Risk of hypotension and bradycardia)    Will avoid SQ (Apokyn).           NON-MOTOR SYMPTOMS        DYSPHAGIA-SIALORRHEA    Not an issue.      ORTHOSTASIS    Monitor clinically.     Orthostatic measures.       DEPRESSION      Not an issue.    On TCA (Amitriptyline) 50 mg QHS       PSEUDOBULBAR AFFECT (PBA)    Not an issue.      COGNITION-AMNESIA     Not an issue.      REM BEHAVIORAL DISORDER (RBD)    Not an issue.      CONSTIPATION    Not an issue.      VISUAL HALLUCINATIONS (VH), INSOMNIA      Try Quetiapine (Seroquel) 25 mg PO QHS.      INTERVENTIONAL MANAGEMENT       Discussed the fact that medications tend to lose efficacy after an average 5-7 years (which does not apply to every patient).    Options include:     Neuravive (MRI guided high intensity focused ultrasound (MRIgFUS) (FUS)    Gamma Knife    Deep Brain Stimulation (DBS)               MEDICAL/SURGICAL COMORBIDITIES     All relevant medical  comorbidities noted and managed by primary care physician and medical care team.          HEALTHY LIFESTYLE AND PREVENTATIVE CARE    The patient to adhere to the age-appropriate health maintenance guidelines including screening tests and vaccinations. The patient to adhere to  healthy lifestyle, optimal weight, exercise, healthy diet, good sleep hygiene and avoiding drugs including smoking, alcohol and recreational drugs.            RTC in 6 months         Herberth Christy MD, FAAN    Attending Neurologist/Epileptologist         Diplomate, American Board of Psychiatry and Neurology    Diplomate, American Board of Clinical Neurophysiology     Fellow, American Academy of Neurology         I spent a total of 71 minutes on the day of the visit.  This includes face to face time and non-face to face time preparing to see the patient (eg, review of tests), obtaining and/or reviewing separately obtained history, documenting clinical information in the electronic or other health record, independently interpreting results and communicating results to the patient/family/caregiver, or care coordinator.

## 2024-01-31 ENCOUNTER — OFFICE VISIT (OUTPATIENT)
Dept: FAMILY MEDICINE | Facility: CLINIC | Age: 84
End: 2024-01-31
Payer: MEDICARE

## 2024-01-31 VITALS
WEIGHT: 135 LBS | HEART RATE: 62 BPM | DIASTOLIC BLOOD PRESSURE: 62 MMHG | TEMPERATURE: 98 F | SYSTOLIC BLOOD PRESSURE: 139 MMHG | BODY MASS INDEX: 21.69 KG/M2 | HEIGHT: 66 IN

## 2024-01-31 DIAGNOSIS — Z78.0 MENOPAUSE: ICD-10-CM

## 2024-01-31 DIAGNOSIS — R30.0 DYSURIA: ICD-10-CM

## 2024-01-31 DIAGNOSIS — N30.00 ACUTE CYSTITIS WITHOUT HEMATURIA: Primary | ICD-10-CM

## 2024-01-31 LAB
BACTERIA #/AREA URNS HPF: ABNORMAL /HPF
BILIRUB UR QL STRIP: ABNORMAL
CLARITY UR: ABNORMAL
COLOR UR: ABNORMAL
GLUCOSE UR QL STRIP: ABNORMAL
HGB UR QL STRIP: ABNORMAL
KETONES UR QL STRIP: ABNORMAL
LEUKOCYTE ESTERASE UR QL STRIP: ABNORMAL
MICROSCOPIC COMMENT: ABNORMAL
NITRITE UR QL STRIP: ABNORMAL
PH UR STRIP: ABNORMAL [PH] (ref 5–8)
PROT UR QL STRIP: ABNORMAL
RBC #/AREA URNS HPF: 4 /HPF (ref 0–4)
SP GR UR STRIP: ABNORMAL (ref 1–1.03)
SQUAMOUS #/AREA URNS HPF: 6 /HPF
URN SPEC COLLECT METH UR: ABNORMAL
WBC #/AREA URNS HPF: >100 /HPF (ref 0–5)
WBC CLUMPS URNS QL MICRO: ABNORMAL

## 2024-01-31 PROCEDURE — 87186 SC STD MICRODIL/AGAR DIL: CPT | Performed by: PHYSICIAN ASSISTANT

## 2024-01-31 PROCEDURE — 87088 URINE BACTERIA CULTURE: CPT | Performed by: PHYSICIAN ASSISTANT

## 2024-01-31 PROCEDURE — 87086 URINE CULTURE/COLONY COUNT: CPT | Performed by: PHYSICIAN ASSISTANT

## 2024-01-31 PROCEDURE — 99214 OFFICE O/P EST MOD 30 MIN: CPT | Mod: PBBFAC,PO | Performed by: PHYSICIAN ASSISTANT

## 2024-01-31 PROCEDURE — 81000 URINALYSIS NONAUTO W/SCOPE: CPT | Mod: PO | Performed by: PHYSICIAN ASSISTANT

## 2024-01-31 PROCEDURE — 87077 CULTURE AEROBIC IDENTIFY: CPT | Performed by: PHYSICIAN ASSISTANT

## 2024-01-31 PROCEDURE — 99213 OFFICE O/P EST LOW 20 MIN: CPT | Mod: S$PBB,,, | Performed by: PHYSICIAN ASSISTANT

## 2024-01-31 PROCEDURE — 99999 PR PBB SHADOW E&M-EST. PATIENT-LVL IV: CPT | Mod: PBBFAC,,, | Performed by: PHYSICIAN ASSISTANT

## 2024-01-31 RX ORDER — NITROFURANTOIN 25; 75 MG/1; MG/1
100 CAPSULE ORAL 2 TIMES DAILY
Qty: 10 CAPSULE | Refills: 0 | Status: SHIPPED | OUTPATIENT
Start: 2024-01-31 | End: 2024-02-05

## 2024-01-31 NOTE — PROGRESS NOTES
Assessment/Plan:    Problem List Items Addressed This Visit    None  Visit Diagnoses       Acute cystitis without hematuria    -  Primary    Relevant Medications    nitrofurantoin, macrocrystal-monohydrate, (MACROBID) 100 MG capsule    Dysuria        Relevant Orders    Urinalysis, Reflex to Urine Culture Urine, Clean Catch (Completed)        -UA positive for infection  -start antibiotics as prescribed  -increase hydration with water  -follow up if no improvement of symptoms   -ER precautions for severe or worsening of symptoms    Follow up if symptoms worsen or fail to improve.    Bell Hunter PA-C  _____________________________________________________________________________________________________________________________________________________    CC: dysuria    HPI: Patient is in clinic today as an established patient here for dysuria. Symptom onset was 1 day ago, but has worsened since that time. She has associated urgency and frequency. Denies fever, chills, hematuria, nausea, vomiting, abdominal pain, flank pain or vaginal symptoms. She has a history of recurrent UTIs in which her last UTI was about 1 month ago. She has been taking Uribel with some improvement of symptoms. No other complaints today.     Past Medical History:   Diagnosis Date    Allergy     Anxiety     Asthma     Coronary artery disease 10/18/2018    DM (diabetes mellitus)     Essential hypertension 10/21/2020    IBS (irritable bowel syndrome)     Migraine headache     Mitral valve prolapse     Type 2 diabetes mellitus with hyperlipidemia 9/6/2016     Past Surgical History:   Procedure Laterality Date    BREAST BIOPSY      BREAST LUMPECTOMY      HYSTERECTOMY      partial    VA COLONOSCOPY,REMV LIZET,FORCEP/CAUTERY  8/23/2012          Review of patient's allergies indicates:   Allergen Reactions    Codeine      Other reaction(s): Unknown  unknown    Diclofenac Nausea And Vomiting    Doxycycline      Other reaction(s): Unknown  unknown     Iodinated contrast media Hives     Pt reports rxn to iv dye years ago,but no rxn to oral contrast    Sulfa (sulfonamide antibiotics)      Other reaction(s): Unknown  unknown     Social History     Tobacco Use    Smoking status: Never    Smokeless tobacco: Never   Substance Use Topics    Alcohol use: No    Drug use: No     Family History   Problem Relation Age of Onset    Heart disease Mother     Diabetes Brother     Heart disease Brother     Early death Brother     Breast cancer Maternal Aunt      Current Outpatient Medications on File Prior to Visit   Medication Sig Dispense Refill    ALPRAZolam (XANAX) 0.25 MG tablet TAKE 1 TABLET BY MOUTH 4 TIMES DAILY AS NEEDED 90 tablet 5    amitriptyline (ELAVIL) 50 MG tablet TAKE 1 TABLET BY MOUTH EVERY EVENING 90 tablet 2    aspirin (ECOTRIN) 81 MG EC tablet Take 81 mg by mouth once daily.      atorvastatin (LIPITOR) 80 MG tablet Take 80 mg by mouth.      blood sugar diagnostic (CONTOUR TEST STRIPS) Strp TEST BLOOD GLUCOSE ONCE DAILY 100 each 11    clopidogrel (PLAVIX) 75 mg tablet Take 75 mg by mouth once daily.      fluorometholone 0.1% (FML) 0.1 % DrpS Place 1 drop into both eyes 4 (four) times daily.      furosemide (LASIX) 20 MG tablet Take 20 mg by mouth daily as needed.      lancets (MICROLET LANCET) Misc TEST BLOOD GLUCOSE ONCE DAILY 100 each 3    lifitegrast (XIIDRA) 5 % Dpet Place 1 drop into both eyes 2 (two) times daily.      metFORMIN (GLUCOPHAGE) 500 MG tablet TAKE 1 TABLET BY MOUTH TWICE DAILY 180 tablet 0    metoprolol succinate (TOPROL-XL) 100 MG 24 hr tablet Take 50 mg by mouth once daily.       nystatin (MYCOSTATIN) powder Apply topically 4 (four) times daily. 60 g 0    pramipexole (MIRAPEX) 0.25 MG tablet Take 1 tablet (0.25 mg total) by mouth 3 (three) times daily. 270 tablet 3    QUEtiapine (SEROQUEL) 25 MG Tab Take 1 tablet (25 mg total) by mouth every evening. (Patient not taking: Reported on 1/31/2024) 90 tablet 3    [DISCONTINUED] nitrofurantoin,  "macrocrystal-monohydrate, (MACROBID) 100 MG capsule Take 1 capsule (100 mg total) by mouth 2 (two) times daily. (Patient not taking: Reported on 1/31/2024) 20 capsule 0     No current facility-administered medications on file prior to visit.       Review of Systems   Constitutional:  Negative for chills, diaphoresis, fatigue and fever.   HENT:  Negative for congestion, ear pain, postnasal drip, sinus pain and sore throat.    Eyes:  Negative for pain and redness.   Respiratory:  Negative for cough, chest tightness and shortness of breath.    Cardiovascular:  Negative for chest pain and leg swelling.   Gastrointestinal:  Negative for abdominal pain, constipation, diarrhea, nausea and vomiting.   Genitourinary:  Positive for dysuria, frequency and urgency. Negative for flank pain and hematuria.   Musculoskeletal:  Negative for arthralgias and joint swelling.   Skin:  Negative for rash.   Neurological:  Negative for dizziness, syncope and headaches.   Psychiatric/Behavioral:  Negative for dysphoric mood. The patient is not nervous/anxious.        Vitals:    01/31/24 1505   BP: 139/62   Pulse: 62   Temp: 98.3 °F (36.8 °C)   Weight: 61.2 kg (135 lb)   Height: 5' 6" (1.676 m)       Wt Readings from Last 3 Encounters:   01/31/24 61.2 kg (135 lb)   01/18/24 59 kg (130 lb)   12/18/23 59.6 kg (131 lb 6.4 oz)       Physical Exam  Constitutional:       General: She is not in acute distress.     Appearance: Normal appearance. She is well-developed.   HENT:      Head: Normocephalic and atraumatic.   Eyes:      Conjunctiva/sclera: Conjunctivae normal.   Cardiovascular:      Rate and Rhythm: Normal rate and regular rhythm.      Pulses: Normal pulses.      Heart sounds: Normal heart sounds. No murmur heard.  Pulmonary:      Effort: Pulmonary effort is normal. No respiratory distress.      Breath sounds: Normal breath sounds.   Abdominal:      General: Bowel sounds are normal. There is no distension.      Palpations: Abdomen is soft. "      Tenderness: There is no abdominal tenderness. There is no right CVA tenderness or left CVA tenderness.   Musculoskeletal:         General: Normal range of motion.      Cervical back: Normal range of motion and neck supple.   Skin:     General: Skin is warm and dry.      Findings: No rash.   Neurological:      General: No focal deficit present.      Mental Status: She is alert and oriented to person, place, and time.   Psychiatric:         Mood and Affect: Mood normal.         Behavior: Behavior normal.         Health Maintenance   Topic Date Due    TETANUS VACCINE  Never done    Shingles Vaccine (1 of 2) Never done    Lipid Panel  04/29/2023    Eye Exam  09/30/2023    DEXA Scan  10/30/2023    Hemoglobin A1c  11/04/2023    Aspirin/Antiplatelet Therapy  01/18/2025

## 2024-02-02 ENCOUNTER — PATIENT MESSAGE (OUTPATIENT)
Dept: ADMINISTRATIVE | Facility: HOSPITAL | Age: 84
End: 2024-02-02
Payer: MEDICARE

## 2024-02-03 LAB — BACTERIA UR CULT: ABNORMAL

## 2024-02-05 NOTE — PROGRESS NOTES
Results have been released via [employer]. Please verify that these have been viewed by patient. If not, please call patient with results.     I have sent a message to them with the following interpretation (see below).    I have reviewed your recent urine culture.    Bacteria is sensitive to the antibiotic prescribed. Please complete the medication and follow up if symptoms persist.    Please do not hesitate to call or message with any additional questions or concerns.    Bell Hunter PA-C

## 2024-03-19 DIAGNOSIS — E11.69 TYPE 2 DIABETES MELLITUS WITH HYPERLIPIDEMIA: ICD-10-CM

## 2024-03-19 DIAGNOSIS — E78.5 TYPE 2 DIABETES MELLITUS WITH HYPERLIPIDEMIA: ICD-10-CM

## 2024-03-19 NOTE — TELEPHONE ENCOUNTER
Care Due:                  Date            Visit Type   Department     Provider  --------------------------------------------------------------------------------                                EP -                              PRIMARY      [facility] FAMILY  Last Visit: 09-      CARE ([facility])   MEDICINE       Padmini Boucher  Next Visit: None Scheduled  None         None Found                                                            Last  Test          Frequency    Reason                     Performed    Due Date  --------------------------------------------------------------------------------    HBA1C.......  6 months...  metFORMIN................  05-   10-    [facility] Embedded Care Due Messages. Reference number: [id number].   3/19/2024 12:40:37 PM CDT

## 2024-03-20 ENCOUNTER — TELEPHONE (OUTPATIENT)
Dept: FAMILY MEDICINE | Facility: CLINIC | Age: 84
End: 2024-03-20
Payer: MEDICARE

## 2024-03-20 DIAGNOSIS — E55.9 VITAMIN D DEFICIENCY: Primary | ICD-10-CM

## 2024-03-20 RX ORDER — METFORMIN HYDROCHLORIDE 500 MG/1
500 TABLET ORAL 2 TIMES DAILY
Qty: 180 TABLET | Refills: 0 | Status: SHIPPED | OUTPATIENT
Start: 2024-03-20

## 2024-03-20 NOTE — TELEPHONE ENCOUNTER
Pt informed Rx sent in, verbalized understanding.     metFORMIN (GLUCOPHAGE) 500 MG tablet 180 tablet 0 3/20/2024 -- No   Sig - Route: TAKE 1 TABLET BY MOUTH TWICE DAILY - Oral   Sent to pharmacy as: metFORMIN (GLUCOPHAGE) 500 MG tablet   Class: Normal   Order: [id number]   Date/Time Signed: 3/20/2024 06:10       E-Prescribing Status: Receipt confirmed by pharmacy (3/20/2024  6:10 AM CDT)     Pharmacy    [id number] - [place]  [address] [place] [address]

## 2024-03-20 NOTE — TELEPHONE ENCOUNTER
----- Message from Quin Richter sent at 3/20/2024 10:22 AM CDT -----  Contact: Alon  Type:  RX Refill Request    Who Called: Alon  Refill or New Rx:refill  RX Name and Strength:metFORMIN (GLUCOPHAGE) 500 MG tablet   How is the patient currently taking it? (ex. 1XDay):as prescribed  Is this a 30 day or 90 day RX:90, if possible  Preferred Pharmacy with phone number:  [id number] [place], [place] - [address] [facility]  [address] [place] [address]  Phone: [phone] Fax: [phone]  Local or Mail Order:local  Ordering Provider:Dr. Boucher  Would the patient rather a call back or a response via [facility]? call  Best Call Back Number:[phone]   Additional Information: Patient reports pharmacy has called in prescription refill 3 times and request to be informed of status. Please give patient a call back to discuss further.   Thank you,  GH

## 2024-03-20 NOTE — TELEPHONE ENCOUNTER
Refill Routing Note   Medication(s) are not appropriate for processing by [facility] for the following reason(s):        Required labs outdated  Required labs abnormal    [facility] action(s):  Defer     Requires labs : Yes             Appointments  past 12m or future 3m with PCP    Date Provider   Last Visit   9/28/2023 Padmini Boucher MD   Next Visit   Visit date not found Padmini Boucher MD   ED visits in past 90 days: 0        Note composed:3:33 AM 03/20/2024

## 2024-03-20 NOTE — TELEPHONE ENCOUNTER
Due for labs prior to next refill. Can schedule follow up appt with me or alber 1-2 wks after labs.   Cbc,cmp,lipid,A1c,vit d,microalb

## 2024-03-20 NOTE — TELEPHONE ENCOUNTER
Pt informed Rx sent but will need labs prior to any additional refills, verbalized understanding and states she will call back to schedule labs.

## 2024-04-04 ENCOUNTER — TELEPHONE (OUTPATIENT)
Dept: FAMILY MEDICINE | Facility: CLINIC | Age: 84
End: 2024-04-04
Payer: MEDICARE

## 2024-04-04 NOTE — TELEPHONE ENCOUNTER
----- Message from Karla Rodriguez sent at 4/4/2024 10:34 AM CDT -----  Regarding: appt  Name of who is calling:   daughter      What is the request in detail: Pt is requesting a call back in ref to cancelling appt / taking ambulance to ER      Can the clinic reply by [facility]:      What number to call back if not [facility]: [phone]

## 2024-04-09 ENCOUNTER — PATIENT OUTREACH (OUTPATIENT)
Dept: ADMINISTRATIVE | Facility: HOSPITAL | Age: 84
End: 2024-04-09
Payer: MEDICARE

## 2024-04-09 NOTE — PROGRESS NOTES
No answer when called, unable to leave voicemail, Care Everywhere indicates Pt had recent fracture & in Rehab  VBHM Score: 5     Osteoporosis Screening  Urine Screening  Eye Exam  Hemoglobin A1c  Lipid Panel    Tetanus Vaccine  Shingles/Zoster Vaccine  RSV Vaccine                  Health Maintenance Topic(s) Outreach Outcomes & Actions Taken:                                               Additional Notes:                 Care Management, Digital Medicine, and/or Education Referrals    OPCM Risk Score: 67.8                 Additional Notes:

## 2024-04-23 PROCEDURE — G0180 MD CERTIFICATION HHA PATIENT: HCPCS | Mod: ,,, | Performed by: INTERNAL MEDICINE

## 2024-04-26 ENCOUNTER — PATIENT OUTREACH (OUTPATIENT)
Dept: ADMINISTRATIVE | Facility: CLINIC | Age: 84
End: 2024-04-26
Payer: MEDICARE

## 2024-04-26 ENCOUNTER — PATIENT MESSAGE (OUTPATIENT)
Dept: FAMILY MEDICINE | Facility: CLINIC | Age: 84
End: 2024-04-26

## 2024-04-26 ENCOUNTER — TELEPHONE (OUTPATIENT)
Dept: FAMILY MEDICINE | Facility: CLINIC | Age: 84
End: 2024-04-26

## 2024-04-26 ENCOUNTER — E-VISIT (OUTPATIENT)
Dept: FAMILY MEDICINE | Facility: CLINIC | Age: 84
End: 2024-04-26
Payer: MEDICARE

## 2024-04-26 ENCOUNTER — TELEPHONE (OUTPATIENT)
Dept: FAMILY MEDICINE | Facility: CLINIC | Age: 84
End: 2024-04-26
Payer: MEDICARE

## 2024-04-26 DIAGNOSIS — B37.31 VULVOVAGINAL CANDIDIASIS: Primary | ICD-10-CM

## 2024-04-26 DIAGNOSIS — R30.0 DYSURIA: ICD-10-CM

## 2024-04-26 PROCEDURE — 99421 OL DIG E/M SVC 5-10 MIN: CPT | Mod: ,,, | Performed by: PHYSICIAN ASSISTANT

## 2024-04-26 RX ORDER — CLOTRIMAZOLE 1 %
CREAM (GRAM) TOPICAL 2 TIMES DAILY
Qty: 45 G | Refills: 0 | Status: SHIPPED | OUTPATIENT
Start: 2024-04-26

## 2024-04-26 RX ORDER — TAMSULOSIN HYDROCHLORIDE 0.4 MG/1
0.4 CAPSULE ORAL DAILY
COMMUNITY

## 2024-04-26 RX ORDER — AMIODARONE HYDROCHLORIDE 200 MG/1
200 TABLET ORAL DAILY
COMMUNITY

## 2024-04-26 RX ORDER — MEMANTINE HYDROCHLORIDE 5 MG/1
5 TABLET ORAL 2 TIMES DAILY
COMMUNITY
Start: 2024-04-22

## 2024-04-26 RX ORDER — FLUCONAZOLE 150 MG/1
150 TABLET ORAL
Qty: 2 TABLET | Refills: 0 | Status: SHIPPED | OUTPATIENT
Start: 2024-04-26 | End: 2024-05-02

## 2024-04-26 RX ORDER — AMOXICILLIN 250 MG
2 CAPSULE ORAL 2 TIMES DAILY
COMMUNITY

## 2024-04-26 RX ORDER — UBIDECARENONE 75 MG
500 CAPSULE ORAL DAILY
COMMUNITY

## 2024-04-26 RX ORDER — FERROUS GLUCONATE 324(38)MG
324 TABLET ORAL
COMMUNITY

## 2024-04-26 RX ORDER — METHOCARBAMOL 500 MG/1
500 TABLET, FILM COATED ORAL 3 TIMES DAILY PRN
COMMUNITY
End: 2024-05-02

## 2024-04-26 RX ORDER — FOLIC ACID 1 MG/1
1 TABLET ORAL DAILY
COMMUNITY

## 2024-04-26 RX ORDER — ENOXAPARIN SODIUM 100 MG/ML
40 INJECTION SUBCUTANEOUS DAILY
COMMUNITY

## 2024-04-26 RX ORDER — OXYCODONE AND ACETAMINOPHEN 2.5; 325 MG/1; MG/1
1 TABLET ORAL EVERY 8 HOURS PRN
COMMUNITY

## 2024-04-26 NOTE — TELEPHONE ENCOUNTER
----- Message from Radha Cm sent at 4/26/2024  9:28 AM CDT -----  Contact: Denis/Consuelo [phone] ask for Genia  Pt woke up with a yeast infection in her groin area and Denis nurse calling requesting something be called in for her. Please call pt to advise @ [phone] .      [id number] - [place] - [address] [address]  Phone: [phone] Fax: [phone]

## 2024-04-26 NOTE — PROGRESS NOTES
C3 nurse spoke with Alon Vargasangely, and patient's daughter, Lety for a TCC post hospital discharge from [facility]. follow up call. The patient has a scheduled HOSFU appointment with Padmini Boucher MD on 5/2/2024 @ 9 AM.

## 2024-04-26 NOTE — TELEPHONE ENCOUNTER
----- Message from Ashli Alexandra sent at 4/26/2024 11:06 AM CDT -----  Regarding: rikki flores Calling to Clarify an RX        Name of Caller  Karla       Pharmacy Name      [id number] - [place] - [address] [facility]  [address] [facility] [address]  Phone: [phone] Fax: [phone]      Prescription Name  fluconazole (DIFLUCAN) 150 MG Tab 2 tablet 0 4/26/2024 4/30/2024 No  Sig - Route: Take 1 tablet (150 mg total) by mouth every 72 hours. for 2          What do they need to clarify?  This medictaion has 3 drug interactions for this pt and they need a alternative . Please adve       Best Call Back Number    Telephone Information:  [phone] opt 4 doctors line         Additional Information:

## 2024-04-26 NOTE — PROGRESS NOTES
Patient ID: Alon Chatman is a 83 y.o. female.    Chief Complaint: Vaginal Discharge (Entered automatically based on patient selection in Patient Portal.)    The patient initiated a request through [employer] on 4/26/2024 for evaluation and management with a chief complaint of Vaginal Discharge (Entered automatically based on patient selection in Patient Portal.)     I evaluated the questionnaire submission on 4/26/24.    [facility] Peq Evisit Vaginal Discharge    4/26/2024 10:00 AM CDT - Filed by Patient   Do you agree to participate in an E-Visit? Yes   If you have any of the following symptoms,  please do not complete an E-Visit,  schedule an appointment with your provider: I acknowledge   What is the main issue you would like addressed today? vaginal irritation, yeast infection   Are you able to take your vital signs? No   Which of the following are you experiencing? Vaginal Itching    Are you having pain while passing urine? Yes, I have pain while urinating.   Which of the following applies to your vaginal discharge? I have no discharge.    Which of the following are you experiencing? None of the above   Do you have any sores on your genitals? No    Have you taken antibiotics recently? I have not been on any antibiotics    Do you use any of the following? None of the above   Which of the following applies to your menstrual period? I do not have menstrual periods   Have you had similar symptoms in the past? Yes, I have had had similar symptoms more than once before.   When you had similar symptoms in the past, did any of the following work? Cream for yeast infection   Have you had a fever? No   During the last 2 months, have you had sexual contact with a specific person for the first time? No   Provide any additional information you feel is important.    Please attach any relevant images or files           Active Problem List with Overview Notes    Diagnosis Date Noted    Vitamin D deficiency 03/20/2024    Multiple  "neurological symptoms 01/18/2024    Insomnia 01/18/2024    Visual hallucinations 01/18/2024    Idiopathic Parkinson's disease 04/14/2023     -managed by neurology  -did not tolerate carbidopa-levodopa  -currently on mirapex with recent dosage increase      Hyperlipidemia associated with type 2 diabetes mellitus 01/12/2023     Hyperlipidemia Medications               atorvastatin (LIPITOR) 80 MG tablet Take 80 mg by mouth.    colestipoL (COLESTID) 1 gram Tab Take 1 tablet (1 g total) by mouth 2 (two) times daily.        -chronic condition. Currently stable.    -reports compliance with hyperlipidemia treatment as prescribed  -denies any known adverse effects of medications  -most recent labs listed below; due for updated labs  Lab Results   Component Value Date    CHOL 159 04/29/2022     Lab Results   Component Value Date    HDL 72 04/29/2022     Lab Results   Component Value Date    LDLCALC 67.4 04/29/2022     Lab Results   Component Value Date    TRIG 98 04/29/2022     Lab Results   Component Value Date    ALT 9 (L) 03/29/2023    AST 16 03/29/2023    ALKPHOS 69 03/29/2023    BILITOT 0.3 03/29/2023           Tremor 04/21/2022     Last Assessment & Plan:   Formatting of this note might be different from the original.  I am not sure how to explain her recent symptoms of "shaking" chest and left upper extremity tremor.  I do not think there cardiac follow-up with neurology evaluation but she wants to wait for now I do not think the symptoms are cardiac      Palpitations 12/15/2021     Last Assessment & Plan:   Formatting of this note might be different from the original.  No recurrence      Osteopenia of multiple sites 11/02/2020     -last DEXA 10/2020- osteopenia  -due for updated DEXA      Essential hypertension 10/21/2020     Hypertension Medications               furosemide (LASIX) 20 MG tablet Take 20 mg by mouth daily as needed.    metoprolol succinate (TOPROL-XL) 100 MG 24 hr tablet Take 50 mg by mouth once " daily.         -at goal today  -continue lifestyle modification with low sodium diet and exercise   -discussed hypertension disease course and importance of treating high blood pressure  -patient understood and advised of risk of untreated blood pressure.  ER precautions were given   for symptoms of hypertensive urgency and emergency.      Migraine without aura and without status migrainosus, not intractable 02/20/2020     -hx of chronic migraines  -currently on elavil 50 mg nightly with prevention of headaches  -tolerating medication without adverse effects      Coronary artery disease 10/18/2018     -hx of STEMI in 2015 and PCI with DIANA to LAD at that time  -remains on ASA/statin. Still on plavix as well but unsure as to why.   -followed by [facility], Dr. Henning; due for follow up soon  -denies symptoms of angina or dyspnea      Type 2 diabetes mellitus with hyperlipidemia 09/06/2016     Diabetes Medications               metFORMIN (GLUCOPHAGE) 500 MG tablet Take 1 tablet (500 mg total) by mouth 2 (two) times daily.        -condition is currently controlled  -hx of AE with Farxiga (yeast)  -see diabetic health maintenance listed below  -on statin: Yes  -on ACE-I/ARB: No  -counseling provided on importance of diabetic diet and medication compliance in order to treat diabetes  -discussed diabetes disease course and potential complications        Chronic cystitis 12/03/2015     Last Assessment & Plan:   Formatting of this note might be different from the original.  Asymptomatic at present. No longer on antibiotics. Okay to reenroll in cardiac rehab      HFrEF (heart failure with reduced ejection fraction) 05/27/2015     -Echo: 5/27/15 post-anterior infarct:  Ejection Fraction = 35-40%.  -Repeat echo December 2015:Normal LV function  -Aug echo 2018: EF 55-60%, mild diastolic dysfunction  -compensated on exam      IBS (irritable bowel syndrome)      -chronic hx of IBS with diarrhea  -evaluated by GI and colorectal  in the past  -symptoms controlled with BID Colestid      Anxiety      -hx of severe anxiety but no longer having symptoms  -has xanax at home but never uses  -not on any other medications for anxiety        Recent Labs Obtained:  No visits with results within 7 Day(s) from this visit.   Latest known visit with results is:   Office Visit on 01/31/2024   Component Date Value Ref Range Status    Specimen UA 01/31/2024 Urine, Clean Catch   Final    Color, UA 01/31/2024 Blue (A)  Yellow, Straw, Terri Final    Appearance, UA 01/31/2024 Cloudy (A)  Clear Final    pH, UA 01/31/2024 SEE COMMENT  5.0 - 8.0 Final    Color may interfere with results    Specific Gravity, UA 01/31/2024 SEE COMMENT  1.005 - 1.030 Final    Color may interfere with results    Protein, UA 01/31/2024 SEE COMMENT  Negative Final    Comment: Recommend a 24 hour urine protein or a urine   protein/creatinine ratio if globulin induced proteinuria is  clinically suspected.  Color may interfere with results      Glucose, UA 01/31/2024 SEE COMMENT  Negative Final    Color may interfere with results    Ketones, UA 01/31/2024 SEE COMMENT  Negative Final    Color may interfere with results    Bilirubin (UA) 01/31/2024 SEE COMMENT  Negative Final    Color may interfere with results    Occult Blood UA 01/31/2024 SEE COMMENT  Negative Final    Color may interfere with results    Nitrite, UA 01/31/2024 SEE COMMENT  Negative Final    Color may interfere with results    Leukocytes, UA 01/31/2024 SEE COMMENT  Negative Final    Color may interfere with results    RBC, UA 01/31/2024 4  0 - 4 /hpf Final    WBC, UA 01/31/2024 >100 (H)  0 - 5 /hpf Final    WBC Clumps, UA 01/31/2024 Few (A)  None-Rare Final    Bacteria 01/31/2024 Many (A)  None-Occ /hpf Final    Squam Epithel, UA 01/31/2024 6  /hpf Final    Microscopic Comment 01/31/2024 SEE COMMENT   Final    Comment: Other formed elements not mentioned in the report are not   present in the microscopic examination.        Urine Culture, Routine 01/31/2024  (A)   Final                    Value:ESCHERICHIA COLI  >100,000 cfu/ml         Encounter Diagnoses   Name Primary?    Vulvovaginal candidiasis Yes    Dysuria         Orders Placed This Encounter   Procedures    Urinalysis, Reflex to Urine Culture Urine, Clean Catch     Standing Status:   Future     Standing Expiration Date:   6/25/2025     Order Specific Question:   Preferred Collection Type     Answer:   Urine, Clean Catch     Order Specific Question:   Specimen Source     Answer:   Urine      Medications Ordered This Encounter   Medications    clotrimazole (LOTRIMIN) 1 % cream     Sig: Apply topically 2 (two) times daily.     Dispense:  45 g     Refill:  0    fluconazole (DIFLUCAN) 150 MG Tab     Sig: Take 1 tablet (150 mg total) by mouth every 72 hours. for 2 doses     Dispense:  2 tablet     Refill:  0        E-Visit Time Tracking:

## 2024-04-26 NOTE — TELEPHONE ENCOUNTER
Please let patient know that I sent in oral and topical antifungal for yeast infection. I also ordered a urinalysis just in case there is concern for UTI.

## 2024-05-01 ENCOUNTER — TELEPHONE (OUTPATIENT)
Dept: FAMILY MEDICINE | Facility: CLINIC | Age: 84
End: 2024-05-01
Payer: MEDICARE

## 2024-05-01 NOTE — TELEPHONE ENCOUNTER
----- Message from Sydnee Larios sent at 5/1/2024  2:14 PM CDT -----  Contact: self  Patient is requesting a urinalysis test be done tomorrow at her appointment. Please advise

## 2024-05-02 ENCOUNTER — OFFICE VISIT (OUTPATIENT)
Dept: FAMILY MEDICINE | Facility: CLINIC | Age: 84
End: 2024-05-02
Payer: MEDICARE

## 2024-05-02 VITALS
HEART RATE: 73 BPM | SYSTOLIC BLOOD PRESSURE: 137 MMHG | BODY MASS INDEX: 20.57 KG/M2 | DIASTOLIC BLOOD PRESSURE: 60 MMHG | HEIGHT: 66 IN | WEIGHT: 128 LBS | TEMPERATURE: 98 F

## 2024-05-02 DIAGNOSIS — E78.5 TYPE 2 DIABETES MELLITUS WITH HYPERLIPIDEMIA: Primary | ICD-10-CM

## 2024-05-02 DIAGNOSIS — Z09 HOSPITAL DISCHARGE FOLLOW-UP: ICD-10-CM

## 2024-05-02 DIAGNOSIS — I48.0 PAROXYSMAL ATRIAL FIBRILLATION: ICD-10-CM

## 2024-05-02 DIAGNOSIS — G20.A1 IDIOPATHIC PARKINSON'S DISEASE: ICD-10-CM

## 2024-05-02 DIAGNOSIS — D64.9 ANEMIA, UNSPECIFIED TYPE: ICD-10-CM

## 2024-05-02 DIAGNOSIS — E11.69 TYPE 2 DIABETES MELLITUS WITH HYPERLIPIDEMIA: Primary | ICD-10-CM

## 2024-05-02 PROCEDURE — 99999 PR PBB SHADOW E&M-EST. PATIENT-LVL V: CPT | Mod: PBBFAC,,, | Performed by: INTERNAL MEDICINE

## 2024-05-02 PROCEDURE — 99215 OFFICE O/P EST HI 40 MIN: CPT | Mod: PBBFAC,PO | Performed by: INTERNAL MEDICINE

## 2024-05-02 PROCEDURE — 99495 TRANSJ CARE MGMT MOD F2F 14D: CPT | Mod: S$PBB,,, | Performed by: INTERNAL MEDICINE

## 2024-05-02 RX ORDER — FLUOROURACIL 50 MG/G
1 CREAM TOPICAL 2 TIMES DAILY
COMMUNITY
Start: 2024-03-20

## 2024-05-02 RX ORDER — DAPAGLIFLOZIN 10 MG/1
10 TABLET, FILM COATED ORAL EVERY MORNING
COMMUNITY
Start: 2024-04-23 | End: 2024-05-02

## 2024-05-02 RX ORDER — METHENAMINE, SODIUM PHOSPHATE, MONOBASIC, ANHYDROUS, PHENYL SALICYLATE, METHYLENE BLUE AND HYOSCYAMINE SULFATE 118; 40.8; 36; 10; .12 MG/1; MG/1; MG/1; MG/1; MG/1
1 CAPSULE ORAL EVERY 6 HOURS PRN
COMMUNITY
Start: 2024-03-24

## 2024-05-02 NOTE — PROGRESS NOTES
Assessment/Plan:    Problem List Items Addressed This Visit          Neuro    Idiopathic Parkinson's disease    Overview     -managed by neurology  -did not tolerate carbidopa-levodopa  -currently on mirapex             Cardiac/Vascular    Atrial fibrillation    Overview     -episode of post-op a fib s/p hip surgery  -on amiodarone in hospital but did not start PO upon d/c due to potential drug reaction with seroquel  -patient to discuss with cardiology at upcoming follow up  -also needs to discuss whether to resume plavix with aspirin once she completes the post-op lovenox            Endocrine    Type 2 diabetes mellitus with hyperlipidemia - Primary    Overview     Diabetes Medications               metFORMIN (GLUCOPHAGE) 500 MG tablet Take 1 tablet (500 mg total) by mouth 2 (two) times daily.        -condition is currently controlled  -hx of AE with Farxiga (yeast); restarted upon d/c but she will discontinue again  -see diabetic health maintenance listed below  -on statin: Yes  -on ACE-I/ARB: No  -counseling provided on importance of diabetic diet and medication compliance in order to treat diabetes  -discussed diabetes disease course and potential complications           Relevant Orders    Comprehensive Metabolic Panel    Hemoglobin A1C     Other Visit Diagnoses       Anemia, unspecified type        Relevant Orders    CBC Auto Differential    Hospital discharge follow-up                  Padmini Boucher MD  _____________________________________________________________________________________________________________________________________________________    CC: hospital discharge follow up    HPI:    Patient is in clinic today as an established patient. Patient recently hospitalized at [facility] for hip fracture, complicated by a fib. D/c summary below:    Admit Date:   4/4/2024 11:17 AM  Discharge Date:   4/8/2024    History of Present Illness:   Ms. Chatman is a 84 y/o that presented to the ER with left  hip pain following a fall weeks prior, but had worsening pain. She was noted to have left hip fracture and admitted for further management. She was noted to have atrial fibrillation, without known history.     Hospital Course and Treatment:     She was seen in consultation by Dr. Henning and started on amiodarone infusion. She was seen in consultation by Dr. Clarke and surgical intervention planned. She was taken to the OR and underwent left hip cemented hemiarthroplasty. She had hypotension and required one unit PRBC in PACU. She began working with PT/OT. She had an echocardiogram revealing normal systolic function. Case management began working on placement.   She remained stable. She was accepted to [facility] and will be transferred. She agreed with plan of care.   Dr. Williamson evaluated patient on day of d/c and agrees.       Since d/c:   History of Present Illness  The patient presents for evaluation of multiple medical concerns. She is accompanied by her daughter.    The patient experienced a fall a few weeks prior to her hospital admission, which resulted in persistent hip pain. Following her discharge from the hospital, she underwent a two-week rehabilitation program and is currently undergoing outpatient therapy. Dr. Clarke performed surgery on the patient.  During her hospital stay, she was administered intravenous medication for atrial fibrillation, which significantly improved her condition post-surgery. The patient's daughter reports that the cardiologist was satisfied with her progress. The patient is prescribed Pacerone 200 mg daily, but has not yet consulted with a cardiologist. The daughter has refrained from administering amiodarone due to potential drug interactions with the pharmacist. The patient's Farxiga medication was discontinued due to a yeast infection, but resumed post-discharge due to insurance constraints. She did begin experiencing yeast infection symptoms since discharge and is using something  over the counter which is helping with symptoms. She continues to take metformin, but has not monitored her blood sugar levels recently. A home health nurse is monitoring her blood sugar levels four times daily. The patient is on a regimen of baby aspirin, but is not on Plavix. She is receiving Lovenox injections once daily, with 4 to 5 days remaining. The daughter is considering resuming Plavix post-injection. The patient received blood post-surgery. Her last blood work was conducted the day after her discharge from rehabilitation.  She saw Dr. Clarke yesterday. He was pleased with his work. He did x-rays yesterday.      No other new complaints today.      All discharge medications have been reviewed and reconciled on chart.     Current Outpatient Medications on File Prior to Visit   Medication Sig Dispense Refill    amiodarone (PACERONE) 200 MG Tab Take 200 mg by mouth once daily.      amitriptyline (ELAVIL) 50 MG tablet TAKE 1 TABLET BY MOUTH EVERY EVENING 90 tablet 2    aspirin (ECOTRIN) 81 MG EC tablet Take 81 mg by mouth once daily.      atorvastatin (LIPITOR) 80 MG tablet Take 80 mg by mouth.      blood sugar diagnostic (CONTOUR TEST STRIPS) Strp TEST BLOOD GLUCOSE ONCE DAILY 100 each 11    clotrimazole (LOTRIMIN) 1 % cream Apply topically 2 (two) times daily. 45 g 0    cyanocobalamin (VITAMIN B-12) 500 MCG tablet Take 500 mcg by mouth once daily.      enoxaparin (LOVENOX) 40 mg/0.4 mL Syrg Inject 40 mg into the skin once daily.      ferrous gluconate (FERGON) 324 MG tablet Take 324 mg by mouth daily with breakfast.      fluorometholone 0.1% (FML) 0.1 % DrpS Place 1 drop into both eyes 4 (four) times daily.      fluorouraciL (EFUDEX) 5 % cream Apply 1 application  topically 2 (two) times daily.      folic acid (FOLVITE) 1 MG tablet Take 1 mg by mouth once daily.      lancets (MICROLET LANCET) Misc TEST BLOOD GLUCOSE ONCE DAILY 100 each 3    lifitegrast (XIIDRA) 5 % Dpet Place 1 drop into both eyes 2 (two)  times daily.      memantine (NAMENDA) 5 MG Tab Take 5 mg by mouth 2 (two) times daily.      metFORMIN (GLUCOPHAGE) 500 MG tablet TAKE 1 TABLET BY MOUTH TWICE DAILY 180 tablet 0    metoprolol succinate (TOPROL-XL) 100 MG 24 hr tablet Take 50 mg by mouth once daily.       nystatin (MYCOSTATIN) powder Apply topically 4 (four) times daily. 60 g 0    oxyCODONE-acetaminophen (PERCOCET) 2.5-325 mg per tablet Take 1 tablet by mouth every 8 (eight) hours as needed for Pain.      pramipexole (MIRAPEX) 0.25 MG tablet Take 1 tablet (0.25 mg total) by mouth 3 (three) times daily. 270 tablet 3    QUEtiapine (SEROQUEL) 25 MG Tab Take 1 tablet (25 mg total) by mouth every evening. 90 tablet 3    senna-docusate 8.6-50 mg (SENNA WITH DOCUSATE SODIUM) 8.6-50 mg per tablet Take 2 tablets by mouth 2 (two) times a day.      tamsulosin (FLOMAX) 0.4 mg Cap Take 0.4 mg by mouth once daily.      URO--10-40.8-36 mg Cap Take 1 capsule by mouth every 6 (six) hours as needed.       No current facility-administered medications on file prior to visit.       Review of Systems   Constitutional:  Negative for chills, diaphoresis, fatigue and fever.   HENT:  Negative for congestion, ear pain, postnasal drip, sinus pain and sore throat.    Eyes:  Negative for pain and redness.   Respiratory:  Negative for cough, chest tightness and shortness of breath.    Cardiovascular:  Negative for chest pain and leg swelling.   Gastrointestinal:  Negative for abdominal pain, constipation, diarrhea, nausea and vomiting.   Genitourinary:  Positive for vaginal discharge. Negative for dysuria and hematuria.   Musculoskeletal:  Negative for arthralgias and joint swelling.   Skin:  Negative for rash.   Neurological:  Negative for dizziness, syncope and headaches.   Psychiatric/Behavioral:  Negative for dysphoric mood. The patient is not nervous/anxious.        Vitals:    05/02/24 0904   BP: 137/60   Pulse: 73   Temp: 98.3 °F (36.8 °C)   Weight: 58.1 kg (128 lb)  "  Height: 5' 6" (1.676 m)       Wt Readings from Last 3 Encounters:   05/02/24 58.1 kg (128 lb)   01/31/24 61.2 kg (135 lb)   01/18/24 59 kg (130 lb)       Physical Exam  Constitutional:       General: She is not in acute distress.     Appearance: Normal appearance. She is well-developed.   HENT:      Head: Normocephalic and atraumatic.   Eyes:      Conjunctiva/sclera: Conjunctivae normal.   Cardiovascular:      Rate and Rhythm: Normal rate and regular rhythm.      Pulses: Normal pulses.      Heart sounds: Normal heart sounds. No murmur heard.  Pulmonary:      Effort: Pulmonary effort is normal. No respiratory distress.      Breath sounds: Normal breath sounds.   Abdominal:      General: Bowel sounds are normal. There is no distension.      Palpations: Abdomen is soft.      Tenderness: There is no abdominal tenderness.   Musculoskeletal:         General: Normal range of motion.      Cervical back: Normal range of motion and neck supple.   Skin:     General: Skin is warm and dry.      Findings: No rash.   Neurological:      General: No focal deficit present.      Mental Status: She is alert and oriented to person, place, and time.   Psychiatric:         Mood and Affect: Mood normal.         Behavior: Behavior normal.         Transitional Care Note    Family and/or Caretaker present at visit?  Yes.  Diagnostic tests reviewed/disposition: I have reviewed all completed as well as pending diagnostic tests at the time of discharge.  Disease/illness education: provided at discharge  Home health/community services discussion/referrals: Patient has home health established.   Establishment or re-establishment of referral orders for community resources: No other necessary community resources.   Discussion with other health care providers: No discussion with other health care providers necessary.     DISCLAIMER: This note was compiled by using a speech recognition dictation system and therefore please be aware that " typographical / speech recognition errors can and do occur.  Please contact me if you see any errors specifically.  Consent was obtained for [employer] recording system prior to the visit.

## 2024-05-02 NOTE — PATIENT INSTRUCTIONS
-follow up with Dr. Henning. Need to know whether to restart plavix with your aspirin and whether to start amiodarone pill. Make sure that he has an updated list of your current medications, including the Quetiapine.   
05-Oct-2019 04:42

## 2024-05-03 ENCOUNTER — PATIENT MESSAGE (OUTPATIENT)
Dept: ADMINISTRATIVE | Facility: HOSPITAL | Age: 84
End: 2024-05-03
Payer: MEDICARE

## 2024-05-03 ENCOUNTER — TELEPHONE (OUTPATIENT)
Dept: NEUROLOGY | Facility: CLINIC | Age: 84
End: 2024-05-03
Payer: MEDICARE

## 2024-05-03 ENCOUNTER — EXTERNAL HOME HEALTH (OUTPATIENT)
Dept: HOME HEALTH SERVICES | Facility: HOSPITAL | Age: 84
End: 2024-05-03
Payer: MEDICARE

## 2024-05-03 NOTE — TELEPHONE ENCOUNTER
Spoke with duran in regards to prescription and advised her of  recommendation and she did verbalize understanding.

## 2024-05-03 NOTE — TELEPHONE ENCOUNTER
----- Message from Herberth Christy MD sent at 5/3/2024  4:40 PM CDT -----  Contact: [facility]  Yes  ----- Message -----  From: Tricia Xie MA  Sent: 5/3/2024   3:21 PM CDT  To: Herberth Christy MD    Mabel would like to know if its okay for patient to take seroquel with Pacerone RX.  ----- Message -----  From: Simran Cortez  Sent: 5/3/2024   3:18 PM CDT  To: Westley Kevin Staff    Type:  Pharmacy Calling to Clarify an RX    Name of Caller:Karla  Pharmacy Name:[facility]  Prescription Name:QUEtiapine (SEROQUEL) 25 MG Tab  What do they need to clarify?: it is interacting with medication,amiodarone (PACERONE) 200 MG Tab  Best Call Back Number:[phone] opt.4  Additional Information:   Thanks   Am

## 2024-05-16 ENCOUNTER — DOCUMENT SCAN (OUTPATIENT)
Dept: HOME HEALTH SERVICES | Facility: HOSPITAL | Age: 84
End: 2024-05-16
Payer: MEDICARE

## 2024-05-20 RX ORDER — ONDANSETRON 4 MG/1
4 TABLET, FILM COATED ORAL EVERY 8 HOURS PRN
Qty: 30 TABLET | Refills: 0 | Status: SHIPPED | OUTPATIENT
Start: 2024-05-20

## 2024-05-20 RX ORDER — DIPHENOXYLATE HYDROCHLORIDE AND ATROPINE SULFATE 2.5; .025 MG/1; MG/1
1 TABLET ORAL 4 TIMES DAILY PRN
Qty: 30 TABLET | Refills: 0 | Status: SHIPPED | OUTPATIENT
Start: 2024-05-20

## 2024-05-20 NOTE — TELEPHONE ENCOUNTER
Spoke with pt's HH andrea Cordero. Stated that pt has had nausea/vomiting since last Thursday. Pt's most recent bp this morning 100/42 and she was able to give her a Lomotil. States that pt is drinking fluids, and hasn't showed any signs of dehydration yet. She is requested an rx for Zofran to be sent to pharmacy. Pls advise.

## 2024-05-20 NOTE — TELEPHONE ENCOUNTER
----- Message from Akshat Boyd sent at 5/20/2024  1:51 PM CDT -----  Contact: [id number]  Type:  Patient Returning Call    Who Called:deonte  Who Left Message for Patient:dawood   Does the patient know what this is regarding?:yes / pt name is Alon Chatman  Would the patient rather a call back or a response via [facility]? Call back   Best Call Back Number:[phone] or [phone]   Additional Information:

## 2024-05-20 NOTE — TELEPHONE ENCOUNTER
Care Due:                  Date            Visit Type   Department     Provider  --------------------------------------------------------------------------------                                \[facility]\"" FAMILY  Last Visit: 05-      FOLLOW UP    MEDICINE       Padmini Boucher  Next Visit: None Scheduled  None         None Found                                                            Last  Test          Frequency    Reason                     Performed    Due Date  --------------------------------------------------------------------------------    HBA1C.......  6 months...  metFORMIN................  05-   10-    [facility] Embedded Care Due Messages. Reference number: [id number].   5/20/2024 2:14:04 PM CDT

## 2024-06-07 ENCOUNTER — DOCUMENT SCAN (OUTPATIENT)
Dept: HOME HEALTH SERVICES | Facility: HOSPITAL | Age: 84
End: 2024-06-07
Payer: MEDICARE

## 2024-06-10 PROBLEM — I48.91 ATRIAL FIBRILLATION: Status: ACTIVE | Noted: 2024-06-10

## 2024-06-22 PROCEDURE — G0179 MD RECERTIFICATION HHA PT: HCPCS | Mod: ,,, | Performed by: INTERNAL MEDICINE

## 2024-07-05 ENCOUNTER — EXTERNAL HOME HEALTH (OUTPATIENT)
Dept: HOME HEALTH SERVICES | Facility: HOSPITAL | Age: 84
End: 2024-07-05
Payer: MEDICARE

## 2024-08-05 ENCOUNTER — TELEPHONE (OUTPATIENT)
Dept: FAMILY MEDICINE | Facility: CLINIC | Age: 84
End: 2024-08-05
Payer: MEDICARE

## 2024-08-08 ENCOUNTER — LAB VISIT (OUTPATIENT)
Dept: LAB | Facility: HOSPITAL | Age: 84
End: 2024-08-08
Attending: INTERNAL MEDICINE
Payer: MEDICARE

## 2024-08-08 ENCOUNTER — OFFICE VISIT (OUTPATIENT)
Dept: FAMILY MEDICINE | Facility: CLINIC | Age: 84
End: 2024-08-08
Payer: MEDICARE

## 2024-08-08 VITALS
HEART RATE: 89 BPM | BODY MASS INDEX: 19.93 KG/M2 | WEIGHT: 124 LBS | TEMPERATURE: 98 F | SYSTOLIC BLOOD PRESSURE: 138 MMHG | DIASTOLIC BLOOD PRESSURE: 67 MMHG | HEIGHT: 66 IN

## 2024-08-08 DIAGNOSIS — E11.69 TYPE 2 DIABETES MELLITUS WITH HYPERLIPIDEMIA: ICD-10-CM

## 2024-08-08 DIAGNOSIS — Z09 HOSPITAL DISCHARGE FOLLOW-UP: ICD-10-CM

## 2024-08-08 DIAGNOSIS — I25.10 CORONARY ARTERY DISEASE: ICD-10-CM

## 2024-08-08 DIAGNOSIS — I10 ESSENTIAL HYPERTENSION: ICD-10-CM

## 2024-08-08 DIAGNOSIS — R60.0 LOWER EXTREMITY EDEMA: Primary | ICD-10-CM

## 2024-08-08 DIAGNOSIS — E78.5 TYPE 2 DIABETES MELLITUS WITH HYPERLIPIDEMIA: ICD-10-CM

## 2024-08-08 DIAGNOSIS — G20.A1 IDIOPATHIC PARKINSON'S DISEASE: ICD-10-CM

## 2024-08-08 DIAGNOSIS — R60.0 LOWER EXTREMITY EDEMA: ICD-10-CM

## 2024-08-08 DIAGNOSIS — I70.0 AORTIC ATHEROSCLEROSIS: ICD-10-CM

## 2024-08-08 LAB
ALBUMIN SERPL BCP-MCNC: 3.2 G/DL (ref 3.5–5.2)
ALP SERPL-CCNC: 55 U/L (ref 55–135)
ALT SERPL W/O P-5'-P-CCNC: 55 U/L (ref 10–44)
ANION GAP SERPL CALC-SCNC: 8 MMOL/L (ref 8–16)
AST SERPL-CCNC: 34 U/L (ref 10–40)
BILIRUB SERPL-MCNC: 0.5 MG/DL (ref 0.1–1)
BUN SERPL-MCNC: 10 MG/DL (ref 8–23)
CALCIUM SERPL-MCNC: 9.1 MG/DL (ref 8.7–10.5)
CHLORIDE SERPL-SCNC: 109 MMOL/L (ref 95–110)
CHOLEST SERPL-MCNC: 140 MG/DL (ref 120–199)
CHOLEST/HDLC SERPL: 2.5 {RATIO} (ref 2–5)
CO2 SERPL-SCNC: 22 MMOL/L (ref 23–29)
CREAT SERPL-MCNC: 0.9 MG/DL (ref 0.5–1.4)
EST. GFR  (NO RACE VARIABLE): >60 ML/MIN/1.73 M^2
GLUCOSE SERPL-MCNC: 109 MG/DL (ref 70–110)
HDLC SERPL-MCNC: 57 MG/DL (ref 40–75)
HDLC SERPL: 40.7 % (ref 20–50)
LDLC SERPL CALC-MCNC: 69 MG/DL (ref 63–159)
NONHDLC SERPL-MCNC: 83 MG/DL
POTASSIUM SERPL-SCNC: 4.3 MMOL/L (ref 3.5–5.1)
PROT SERPL-MCNC: 6.2 G/DL (ref 6–8.4)
SODIUM SERPL-SCNC: 139 MMOL/L (ref 136–145)
TRIGL SERPL-MCNC: 70 MG/DL (ref 30–150)

## 2024-08-08 PROCEDURE — 83036 HEMOGLOBIN GLYCOSYLATED A1C: CPT | Performed by: INTERNAL MEDICINE

## 2024-08-08 PROCEDURE — 99496 TRANSJ CARE MGMT HIGH F2F 7D: CPT | Mod: S$PBB,,, | Performed by: INTERNAL MEDICINE

## 2024-08-08 PROCEDURE — 99215 OFFICE O/P EST HI 40 MIN: CPT | Mod: PBBFAC,PO | Performed by: INTERNAL MEDICINE

## 2024-08-08 PROCEDURE — 83880 ASSAY OF NATRIURETIC PEPTIDE: CPT | Performed by: INTERNAL MEDICINE

## 2024-08-08 PROCEDURE — 80053 COMPREHEN METABOLIC PANEL: CPT | Performed by: INTERNAL MEDICINE

## 2024-08-08 PROCEDURE — 36415 COLL VENOUS BLD VENIPUNCTURE: CPT | Mod: PO | Performed by: INTERNAL MEDICINE

## 2024-08-08 PROCEDURE — 80061 LIPID PANEL: CPT | Performed by: INTERNAL MEDICINE

## 2024-08-08 PROCEDURE — 99999 PR PBB SHADOW E&M-EST. PATIENT-LVL V: CPT | Mod: PBBFAC,,, | Performed by: INTERNAL MEDICINE

## 2024-08-08 RX ORDER — FUROSEMIDE 20 MG/1
20 TABLET ORAL DAILY PRN
COMMUNITY

## 2024-08-08 RX ORDER — CEFDINIR 300 MG/1
300 CAPSULE ORAL DAILY
COMMUNITY
Start: 2024-08-05 | End: 2024-08-09

## 2024-08-08 NOTE — PATIENT INSTRUCTIONS
-start your lasix pill for the next few days to help with swelling and remember to elevate your legs when your at home  -start drinking at least 1 glucerna once daily to help with nutrition and weight  -start a weight diary/journal; use this to monitor if you are holding extra fluid or not

## 2024-08-09 ENCOUNTER — PATIENT OUTREACH (OUTPATIENT)
Dept: ADMINISTRATIVE | Facility: CLINIC | Age: 84
End: 2024-08-09
Payer: MEDICARE

## 2024-08-09 LAB
BNP SERPL-MCNC: 475 PG/ML (ref 0–99)
ESTIMATED AVG GLUCOSE: 131 MG/DL (ref 68–131)
HBA1C MFR BLD: 6.2 % (ref 4–5.6)

## 2024-08-20 ENCOUNTER — DOCUMENT SCAN (OUTPATIENT)
Dept: HOME HEALTH SERVICES | Facility: HOSPITAL | Age: 84
End: 2024-08-20
Payer: MEDICARE

## 2024-08-21 ENCOUNTER — OFFICE VISIT (OUTPATIENT)
Dept: NEUROLOGY | Facility: CLINIC | Age: 84
End: 2024-08-21
Payer: MEDICARE

## 2024-08-21 VITALS
SYSTOLIC BLOOD PRESSURE: 151 MMHG | DIASTOLIC BLOOD PRESSURE: 62 MMHG | WEIGHT: 120.13 LBS | BODY MASS INDEX: 19.39 KG/M2 | HEART RATE: 51 BPM

## 2024-08-21 DIAGNOSIS — E55.9 VITAMIN D DEFICIENCY: ICD-10-CM

## 2024-08-21 DIAGNOSIS — R25.1 TREMOR: ICD-10-CM

## 2024-08-21 DIAGNOSIS — F41.9 ANXIETY: ICD-10-CM

## 2024-08-21 DIAGNOSIS — R44.1 VISUAL HALLUCINATIONS: ICD-10-CM

## 2024-08-21 DIAGNOSIS — E78.5 HYPERLIPIDEMIA ASSOCIATED WITH TYPE 2 DIABETES MELLITUS: ICD-10-CM

## 2024-08-21 DIAGNOSIS — I48.0 PAROXYSMAL ATRIAL FIBRILLATION: ICD-10-CM

## 2024-08-21 DIAGNOSIS — R00.2 PALPITATIONS: ICD-10-CM

## 2024-08-21 DIAGNOSIS — Z96.642 STATUS POST LEFT HIP REPLACEMENT: ICD-10-CM

## 2024-08-21 DIAGNOSIS — I10 ESSENTIAL HYPERTENSION: ICD-10-CM

## 2024-08-21 DIAGNOSIS — I50.20 HFREF (HEART FAILURE WITH REDUCED EJECTION FRACTION): ICD-10-CM

## 2024-08-21 DIAGNOSIS — Z91.81 STATUS POST FALL: ICD-10-CM

## 2024-08-21 DIAGNOSIS — E78.5 TYPE 2 DIABETES MELLITUS WITH HYPERLIPIDEMIA: ICD-10-CM

## 2024-08-21 DIAGNOSIS — E11.69 HYPERLIPIDEMIA ASSOCIATED WITH TYPE 2 DIABETES MELLITUS: ICD-10-CM

## 2024-08-21 DIAGNOSIS — G20.A1 IDIOPATHIC PARKINSON'S DISEASE: ICD-10-CM

## 2024-08-21 DIAGNOSIS — G47.00 INSOMNIA, UNSPECIFIED TYPE: ICD-10-CM

## 2024-08-21 DIAGNOSIS — K58.0 IRRITABLE BOWEL SYNDROME WITH DIARRHEA: ICD-10-CM

## 2024-08-21 DIAGNOSIS — I25.10 CORONARY ARTERY DISEASE INVOLVING NATIVE CORONARY ARTERY OF NATIVE HEART WITHOUT ANGINA PECTORIS: ICD-10-CM

## 2024-08-21 DIAGNOSIS — N30.20 CHRONIC CYSTITIS: ICD-10-CM

## 2024-08-21 DIAGNOSIS — M85.89 OSTEOPENIA OF MULTIPLE SITES: ICD-10-CM

## 2024-08-21 DIAGNOSIS — R29.90 MULTIPLE NEUROLOGICAL SYMPTOMS: Primary | ICD-10-CM

## 2024-08-21 DIAGNOSIS — G43.009 MIGRAINE WITHOUT AURA AND WITHOUT STATUS MIGRAINOSUS, NOT INTRACTABLE: ICD-10-CM

## 2024-08-21 DIAGNOSIS — E11.69 TYPE 2 DIABETES MELLITUS WITH HYPERLIPIDEMIA: ICD-10-CM

## 2024-08-21 PROCEDURE — 99215 OFFICE O/P EST HI 40 MIN: CPT | Mod: PBBFAC | Performed by: PSYCHIATRY & NEUROLOGY

## 2024-08-21 PROCEDURE — 99215 OFFICE O/P EST HI 40 MIN: CPT | Mod: S$PBB,,, | Performed by: PSYCHIATRY & NEUROLOGY

## 2024-08-21 PROCEDURE — G0179 MD RECERTIFICATION HHA PT: HCPCS | Mod: ,,, | Performed by: INTERNAL MEDICINE

## 2024-08-21 PROCEDURE — 99999 PR PBB SHADOW E&M-EST. PATIENT-LVL V: CPT | Mod: PBBFAC,,, | Performed by: PSYCHIATRY & NEUROLOGY

## 2024-08-21 RX ORDER — QUETIAPINE FUMARATE 100 MG/1
100 TABLET, FILM COATED ORAL NIGHTLY
Qty: 90 TABLET | Refills: 3 | Status: SHIPPED | OUTPATIENT
Start: 2024-08-21 | End: 2025-08-21

## 2024-08-21 NOTE — PROGRESS NOTES
Subjective:       Patient ID: Alon Chatman is a 84 y.o. female.    Chief Complaint: Idiopathic Parkinson's disease (6 month f/u)          HPI        BACKGROUND HISTORY       The patient started having tremors in 2022.  The patient's tremors are strictly in the LUE and LLE. No head tremors. No voice tremors. The tremors seem to be mainly at rest and during activity. The patient's gait slowed down. Postural stability is impaired alongside with occasional mild postural lightheadedness. The patient also complains of muscle stiffness. No hallucinations. No delusions. No alteration of mental status.  No language or communication problems. No muscle weakness or twitching. No trouble swallowing. No eye movement difficulty. The patient's voice tone and volume have decreased. In addition, the facial expressions have declined. The handwriting has gotten smaller. No history of TBI. No history of urine incontinence. No history of memory loss. No  drooling and no trouble swallowing. The patient reports reactive depression. Denies spells of sudden laughing but they seem to be unprovoked. No suicidal or homicidal ideations. No antipsychotic or antiemetic meds use for prolonged time. No history of CO poisoning. No family history of similar symptoms. The patient denies history of strokes. No history of occupational toxic exposure. No history of chronic liver disease. Ordered Jackie Scan and started her on CD/LD 25/100 mg QID.I also recommended PT. On 04- Jackie Scan is abnormal and confirms Primary Parkinsonism like Parkinson's disease. . Could not tolerate CD/LD due to N/V. Tolerated Mirapex 0.125 mg TID which's less effective than CD/LD. The patient has enjoyed and benefited tremendously from PT.Continued Pramipexole (Mirapex) and changed from 0.125 mg TID to 0.25 mg TID.   She is complaining of trouble sleeping despite being on Amitriptyline 50 mg QHS which she used for decades and eradicated her migraines and daughter  reported some instances of VH and it remains unclear if she was doubling her medication (Mirapex) so I tried  Quetiapine (Seroquel) 25 mg PO QHS.      INTERVAL HISTORY       Accompanied by daughter on 08- for follow up.    Doing well Pramipexole (Mirapex) 0.25 mg TID. Had a fall in  and underwent LT PHR and started on Namenda 5 mg BID while in rehab. Quetiapine (Seroquel) 25 mg PO QHS has helped but still struggles with VH and Insomnia.          Review of Systems   Constitutional:  Negative for appetite change and fatigue.   HENT:  Positive for hearing loss. Negative for tinnitus.    Eyes:  Negative for photophobia and visual disturbance.   Respiratory:  Negative for apnea and shortness of breath.    Cardiovascular:  Negative for chest pain and palpitations.   Gastrointestinal:  Negative for nausea and vomiting.   Endocrine: Negative for cold intolerance and heat intolerance.   Genitourinary:  Negative for difficulty urinating and urgency.   Musculoskeletal:  Positive for gait problem. Negative for arthralgias, back pain, joint swelling, myalgias, neck pain and neck stiffness.   Skin:  Negative for color change and rash.   Allergic/Immunologic: Negative for environmental allergies and immunocompromised state.   Neurological:  Positive for tremors and speech difficulty. Negative for dizziness, seizures, syncope, facial asymmetry, weakness, light-headedness, numbness and headaches.   Hematological:  Negative for adenopathy. Does not bruise/bleed easily.   Psychiatric/Behavioral:  Negative for agitation, behavioral problems, confusion, decreased concentration, dysphoric mood, hallucinations, self-injury, sleep disturbance and suicidal ideas. The patient is nervous/anxious. The patient is not hyperactive.                  Current Outpatient Medications:     amiodarone (PACERONE) 200 MG Tab, Take 200 mg by mouth once daily., Disp: , Rfl:     amitriptyline (ELAVIL) 50 MG tablet, TAKE 1 TABLET BY MOUTH EVERY  EVENING, Disp: 90 tablet, Rfl: 2    apixaban (ELIQUIS) 5 mg Tab, Take 5 mg by mouth 2 (two) times daily., Disp: , Rfl:     atorvastatin (LIPITOR) 80 MG tablet, Take 80 mg by mouth., Disp: , Rfl:     blood sugar diagnostic (CONTOUR TEST STRIPS) Strp, TEST BLOOD GLUCOSE ONCE DAILY, Disp: 100 each, Rfl: 11    clotrimazole (LOTRIMIN) 1 % cream, Apply topically 2 (two) times daily., Disp: 45 g, Rfl: 0    cyanocobalamin (VITAMIN B-12) 500 MCG tablet, Take 500 mcg by mouth once daily., Disp: , Rfl:     diphenoxylate-atropine 2.5-0.025 mg (LOMOTIL) 2.5-0.025 mg per tablet, Take 1 tablet by mouth 4 (four) times daily as needed for Diarrhea., Disp: 30 tablet, Rfl: 0    ferrous gluconate (FERGON) 324 MG tablet, Take 324 mg by mouth daily with breakfast., Disp: , Rfl:     fluorometholone 0.1% (FML) 0.1 % DrpS, Place 1 drop into both eyes 4 (four) times daily., Disp: , Rfl:     fluorouraciL (EFUDEX) 5 % cream, Apply 1 application  topically 2 (two) times daily., Disp: , Rfl:     folic acid (FOLVITE) 1 MG tablet, Take 1 mg by mouth once daily., Disp: , Rfl:     furosemide (LASIX) 20 MG tablet, Take 20 mg by mouth daily as needed., Disp: , Rfl:     lancets (MICROLET LANCET) Misc, TEST BLOOD GLUCOSE ONCE DAILY, Disp: 100 each, Rfl: 3    lifitegrast (XIIDRA) 5 % Dpet, Place 1 drop into both eyes 2 (two) times daily., Disp: , Rfl:     memantine (NAMENDA) 5 MG Tab, Take 5 mg by mouth 2 (two) times daily., Disp: , Rfl:     metFORMIN (GLUCOPHAGE) 500 MG tablet, TAKE 1 TABLET BY MOUTH TWICE DAILY, Disp: 180 tablet, Rfl: 0    metoprolol succinate (TOPROL-XL) 100 MG 24 hr tablet, Take 50 mg by mouth once daily. , Disp: , Rfl:     nystatin (MYCOSTATIN) powder, Apply topically 4 (four) times daily., Disp: 60 g, Rfl: 0    ondansetron (ZOFRAN) 4 MG tablet, Take 1 tablet (4 mg total) by mouth every 8 (eight) hours as needed for Nausea., Disp: 30 tablet, Rfl: 0    oxyCODONE-acetaminophen (PERCOCET) 2.5-325 mg per tablet, Take 1 tablet by mouth  every 8 (eight) hours as needed for Pain., Disp: , Rfl:     QUEtiapine (SEROQUEL) 25 MG Tab, Take 1 tablet (25 mg total) by mouth every evening., Disp: 90 tablet, Rfl: 3    senna-docusate 8.6-50 mg (SENNA WITH DOCUSATE SODIUM) 8.6-50 mg per tablet, Take 2 tablets by mouth 2 (two) times a day., Disp: , Rfl:     tamsulosin (FLOMAX) 0.4 mg Cap, Take 0.4 mg by mouth once daily., Disp: , Rfl:     pramipexole (MIRAPEX) 0.25 MG tablet, Take 1 tablet (0.25 mg total) by mouth 3 (three) times daily., Disp: 270 tablet, Rfl: 3        Past Medical History:   Diagnosis Date    Allergy     Anxiety     Asthma     Atrial fibrillation 6/10/2024    Coronary artery disease 10/18/2018    DM (diabetes mellitus)     Essential hypertension 10/21/2020    IBS (irritable bowel syndrome)     Migraine headache     Mitral valve prolapse     Type 2 diabetes mellitus with hyperlipidemia 9/6/2016     Past Surgical History:   Procedure Laterality Date    BREAST BIOPSY      BREAST LUMPECTOMY      HYSTERECTOMY      partial    TN COLONOSCOPY,REMV LESN,FORCEP/CAUTERY  8/23/2012          Social History     Socioeconomic History    Marital status:    Tobacco Use    Smoking status: Never    Smokeless tobacco: Never   Substance and Sexual Activity    Alcohol use: No    Drug use: No    Sexual activity: Not Currently     Social Determinants of Health     Financial Resource Strain: Unknown (2/20/2021)    [facility]    Overall Financial Resource Strain (CARDIA)     Difficulty of Paying Living Expenses: Patient declined   Food Insecurity: Unknown (8/3/2024)    Received from [facility]    Hunger Vital Sign     Ran Out of Food in the Last Year: Never true   Transportation Needs: Unknown (8/3/2024)    Received from [facility]    PRAPARE - Transportation     Lack of Transportation (Medical): No     Lack of Transportation (Non-Medical): Patient declined   Physical Activity:  Unknown (2/20/2021)    [facility]    Exercise Vital Sign     Days of Exercise per Week: Patient declined     Minutes of Exercise per Session: Patient declined   Stress: Unknown (2/20/2021)    [facility]    [employer] - Occupational Stress Questionnaire     Feeling of Stress : Patient declined   Housing Stability: Unknown (8/3/2024)    Received from [facility]    Housing Stability Vital Sign     Homeless in the Last Year: No             Past/Current Medical/Surgical History, Past/Current Social History, Past/Current Family History and Past/Current Medications were reviewed in detail.        Objective:           VITAL SIGNS WERE REVIEWED      GENERAL APPEARANCE:     The patient looks comfortable.    BMI 20.98     No signs of respiratory distress.    Normal breathing pattern.    No dysmorphic features    Normal eye contact.       GENERAL MEDICAL EXAM:    HEENT:  Head is atraumatic normocephalic.     FUNDUSCOPIC (OPHTHALMOSCOPIC) EXAMINATION showed no disc edema.      NECK: No JVD. No visible lesions or goiters.     CHEST-CARDIOPULMONARY: No cyanosis. No tachypnea. Normal respiratory effort.    [id number]-USORSCALIK: No jaundice. No stomas or lesions. No visible hernias. No catheters.     SKIN, HAIR, NAILS: No pathognomonic skin rash.No neurofibromatosis. No visible lesions.No stigmata of autoimmune disease. No clubbing.    LIMBS: No varicose veins. No visible swelling.    MUSCULOSKELETAL: No visible deformities.No visible lesions.           Neurologic Exam     Mental Status   Oriented to person, place, and time.   Follows 3 step commands.   Attention: normal. Concentration: normal.   Speech: speech is normal   Level of consciousness: alert  Able to name object. Able to repeat. Normal comprehension.     Cranial Nerves   Cranial nerves II through XII intact.      CN II   Visual fields full to confrontation.   Visual acuity: normal  Right visual field deficit: none  Left visual field deficit: none     CN III, IV, VI   Pupils are equal, round, and reactive to light.  Extraocular motions are normal.   Right pupil: Size: 2 mm. Shape: regular. Reactivity: brisk. Consensual response: intact. Accommodation: intact.   Left pupil: Size: 2 mm. Shape: regular. Reactivity: brisk. Consensual response: intact. Accommodation: intact.   CN III: no CN III palsy  CN VI: no CN VI palsy  Nystagmus: none   Diplopia: none  Ophthalmoparesis: none  Upgaze: normal  Downgaze: normal  Conjugate gaze: present  Vestibulo-ocular reflex: present    CN V   Facial sensation intact.   Right facial sensation deficit: none  Left facial sensation deficit: none  Jaw jerk: normal    CN VII   Facial expression full, symmetric.   Right facial weakness: none  Left facial weakness: none    CN VIII   CN VIII normal.   Hearing: impaired    CN IX, X   CN IX normal.   CN X normal.   Palate: symmetric    CN XI   CN XI normal.   Right sternocleidomastoid strength: normal  Left sternocleidomastoid strength: normal  Right trapezius strength: normal  Left trapezius strength: normal    CN XII   CN XII normal.   Tongue: not atrophic  Fasciculations: absent  Tongue deviation: none    Motor Exam   Muscle bulk: normal  Overall muscle tone: normal  Right arm tone: normal  Left arm tone: normal  Right arm pronator drift: absent  Left arm pronator drift: absent  Right leg tone: normal  Left leg tone: normal    Strength   Strength 5/5 throughout.   Right neck flexion: 5/5  Left neck flexion: 5/5  Right neck extension: 5/5  Left neck extension: 5/5  Right deltoid: 5/5  Left deltoid: 5/5  Right biceps: 5/5  Left biceps: 5/5  Right triceps: 5/5  Left triceps: 5/5  Right wrist flexion: 5/5  Left wrist flexion: 5/5  Right wrist extension: 5/5  Left wrist extension: 5/5  Right interossei: 5/5  Left interossei: 5/5  Right iliopsoas:  5/5  Left iliopsoas: 5/5  Right quadriceps: 5/5  Left quadriceps: 5/5  Right hamstrin/5  Left hamstrin/5  Right glutei: 5/5  Left glutei: 5/5  Right anterior tibial: 5/5  Left anterior tibial: 5/5  Right posterior tibial: 5/5  Left posterior tibial: 5/5  Right peroneal: 5/5  Left peroneal: 5/5  Right gastroc: 5/5  Left gastroc: 5/5    Sensory Exam   Light touch normal.   Right arm light touch: normal  Left arm light touch: normal  Right leg light touch: normal  Left leg light touch: normal  Right arm vibration: normal  Left arm vibration: normal  Right leg vibration: decreased from toes  Left leg vibration: decreased from toes  Proprioception normal.   Right arm proprioception: normal  Left arm proprioception: normal  Right leg proprioception: normal  Left leg proprioception: normal  Pinprick normal.   Right arm pinprick: normal  Left arm pinprick: normal  Right leg pinprick: normal  Left leg pinprick: normal  Graphesthesia: normal  Stereognosis: normal    Gait, Coordination, and Reflexes     Gait  Gait: shuffling (Antalgic)    Coordination   Finger to nose coordination: normal    Tremor   Resting tremor: present (LUE LLE)  Intention tremor: absent  Action tremor: absent    Reflexes   Right brachioradialis: 2+  Left brachioradialis: 2+  Right biceps: 2+  Left biceps: 2+  Right triceps: 2+  Left triceps: 2+  Right patellar: 2+  Left patellar: 2+  Right achilles: 1+  Left achilles: 1+  Right plantar: normal  Left plantar: normal  Right Olivo: absent  Left Olivo: absent  Right ankle clonus: absent  Left ankle clonus: absent  Right pendular knee jerk: absent  Left pendular knee jerk: absent    EXTRAPYRAMIDAL EXAMINATION FOR PARKINSONISM     KEY: 0 NL, 1 MILD, 2 MODERATE, 3 SEVERE, 4 ADVANCED.       HEAD AND NECK AND CRANIAL NERVES EXAMINATION:       Facial expressions: Mild Hypomimia.     Visual Acuity: Decreased.     Smell: Mild Anosmia.    Voice: Significant Hypophonia.     Swallowing: No Dysphagia or  Sialorrhea.       NON-MOTOR EXAMINATION:      Autonomic: Mild Orthostasis    Mood: Reactive Depression    Emotional continence: No PBA.     Cognition: No Amnesia.    Behavior: VH      INVOLUNTARY MOVEMENTS:     No Rest Tremor in the RUE     3 Hz  2/4 Rest Tremor in the LUE.     No Rest Tremor in the RLE     3 Hz 2/4 Rest Tremor in the LLE      TONE EXAMINATION:     No Nuchal Rigidity    No Appendicular Rigidity and Paratonia in the RUE    No Appendicular Rigidity and Paratonia in the LUE.     No appendicular Rigidity or Paratonia in the RLE    No appendicular Rigidity and Paratonia in the LLE.      BRADYKINESIA EXAMINATION:     Rapid Alternating Movements (Elza) are normal in the RT    Rapid Alternating Movements (Elza) are normal in the LT    Positive micrographia.      STANCE, POSTURAL STABILITY AND GAIT:     The patient was unable to stand up with crossed arms without difficulty.    Postural stability by Retropulsion and Propulsion is 2/4 abnormal.     There is evidence of 2/4  gait apraxia, festination/shuffling and gait rigidity.                  Lab Results   Component Value Date    WBC 11.8 (H) 04/05/2024    HGB 10.1 (L) 04/05/2024    HCT 31.3 (L) 04/05/2024    MCV 93.2 04/05/2024     04/05/2024     Sodium   Date Value Ref Range Status   08/08/2024 139 136 - 145 mmol/L Final     Potassium   Date Value Ref Range Status   08/08/2024 4.3 3.5 - 5.1 mmol/L Final     Chloride   Date Value Ref Range Status   08/08/2024 109 95 - 110 mmol/L Final     CO2   Date Value Ref Range Status   08/08/2024 22 (L) 23 - 29 mmol/L Final     Glucose   Date Value Ref Range Status   08/08/2024 109 70 - 110 mg/dL Final     BUN   Date Value Ref Range Status   08/08/2024 10 8 - 23 mg/dL Final     Creatinine   Date Value Ref Range Status   08/08/2024 0.9 0.5 - 1.4 mg/dL Final     Calcium   Date Value Ref Range Status   08/08/2024 9.1 8.7 - 10.5 mg/dL Final     Total Protein   Date Value Ref Range Status   08/08/2024 6.2 6.0 - 8.4  g/dL Final     Albumin   Date Value Ref Range Status   08/08/2024 3.2 (L) 3.5 - 5.2 g/dL Final     Total Bilirubin   Date Value Ref Range Status   08/08/2024 0.5 0.1 - 1.0 mg/dL Final     Comment:     For infants and newborns, interpretation of results should be based  on gestational age, weight and in agreement with clinical  observations.    Premature Infant recommended reference ranges:  Up to 24 hours.............<8.0 mg/dL  Up to 48 hours............<12.0 mg/dL  3-5 days..................<15.0 mg/dL  6-29 days.................<15.0 mg/dL       Alkaline Phosphatase   Date Value Ref Range Status   08/08/2024 55 55 - 135 U/L Final     AST   Date Value Ref Range Status   08/08/2024 34 10 - 40 U/L Final     ALT   Date Value Ref Range Status   08/08/2024 55 (H) 10 - 44 U/L Final     Anion Gap   Date Value Ref Range Status   08/08/2024 8 8 - 16 mmol/L Final     eGFR if    Date Value Ref Range Status   04/29/2022 >60.0 >60 mL/min/1.73 m^2 Final     eGFR if non    Date Value Ref Range Status   04/29/2022 53.3 (A) >60 mL/min/1.73 m^2 Final     Comment:     Calculation used to obtain the estimated glomerular filtration  rate (eGFR) is the CKD-EPI equation.          Lab Results   Component Value Date    TSH 3.358 03/29/2023             LABORATORY EVALUATION         [id number]    CBC, CMP, TFT, HA1C only remarkable T2DM         RADIOLOGY EVALUATION         04-    Jackie Scan is abnormal and confirms Primary Parkinsonism like Parkinson's disease.       Reviewed the neuroimaging independently       Assessment:         1. Multiple neurological symptoms    2. Paroxysmal atrial fibrillation    3. Vitamin D deficiency    4. Visual hallucinations    5. Insomnia, unspecified type    6. Idiopathic Parkinson's disease    7. Tremor    8. Palpitations    9. HFrEF (heart failure with reduced ejection fraction)    10. Chronic cystitis    11. Hyperlipidemia associated with type 2 diabetes mellitus    12.  Osteopenia of multiple sites    13. Essential hypertension    14. Migraine without aura and without status migrainosus, not intractable    15. Coronary artery disease involving native coronary artery of native heart without angina pectoris    16. Type 2 diabetes mellitus with hyperlipidemia    17. Anxiety    18. Irritable bowel syndrome with diarrhea            Plan:             IDIOPATHIC PARKINSON'S DISEASE (IPD), LEFT SIDE ONSET 2022          PHYSIOTHERAPY    Continue PT/OT/ST/LSVT    Avoid driving.    Falling down precautions.      PHARMACOTHERAPY       Discussed the fact that medications tend to lose efficacy after 5-7 years and DBS needs to be considered then.    Discussed the fact that PD is progressive disease for which we do not have DMA. Our management is purely symptomatic.       DOPAMINERGIC MEDICATIONS:       Continue Pramipexole (Mirapex) 0.25 mg TID. Maximum dose 1.5 mg PO TID and for her we most likely note exceed 1/2 maximum.       Failed CD/LD due to N/V.      NEXT Options:Ropirinole (Requip), and Rotigotine (Neupro) (TD Patch)      ADJUNCTIVE TREATMENTS     AMANTADINE      If dyskinesia (chorea) emerges will consider Amantadine 100 mg BID as anti-dyskinetic (Anti CD/LD-induced chorea)       COMT INHIBITORS     If ON/OFF periods start to emerge will consider Entacapone (Comtan) 200 mg PO with CD/LD.     Other options: Tolcapone (Tasmar), Opicapone (Ongentys).      MAO INHIBITORS     Seligiline (Zelapar), Rosagiline (Azilect)    The risk of interactions outweigh the marginal benefits.       FREEZING SPELLS     If freezing spells become life altering will consider Apomorphine SL (Kynmobi) 10-30 mg PRN with close monitoring of BP and MD (Risk of hypotension and bradycardia)    Will avoid SQ (Apokyn).           NON-MOTOR SYMPTOMS        DYSPHAGIA-SIALORRHEA    Not an issue.      ORTHOSTASIS    Monitor clinically.     Orthostatic measures.     Re-evaluate the need for Flomax and  Lasix.      DEPRESSION      Not an issue.    On TCA (Amitriptyline) 50 mg QHS       PSEUDOBULBAR AFFECT (PBA)    Not an issue.      COGNITION-AMNESIA     Not an issue.      REM BEHAVIORAL DISORDER (RBD)    Not an issue.      CONSTIPATION    Not an issue.      VISUAL HALLUCINATIONS (VH), INSOMNIA      Titrate  Quetiapine (Seroquel) to 100 mg PO QHS.      INTERVENTIONAL MANAGEMENT       Discussed the fact that medications tend to lose efficacy after an average 5-7 years (which does not apply to every patient).    Options include:     Neuravive (MRI guided high intensity focused ultrasound (MRIgFUS) (FUS)    Gamma Knife    Deep Brain Stimulation (DBS)               MEDICAL/SURGICAL COMORBIDITIES     All relevant medical comorbidities noted and managed by primary care physician and medical care team.          HEALTHY LIFESTYLE AND PREVENTATIVE CARE    The patient to adhere to the age-appropriate health maintenance guidelines including screening tests and vaccinations. The patient to adhere to  healthy lifestyle, optimal weight, exercise, healthy diet, good sleep hygiene and avoiding drugs including smoking, alcohol and recreational drugs.          RTC 6 MONTHS         Herberth Christy MD, FAAN    Attending Neurologist/Epileptologist         Diplomate, American Board of Psychiatry and Neurology    Diplomate, American Board of Clinical Neurophysiology     Fellow, American Academy of Neurology       I spent a total of 40 minutes on the day of the visit.  This includes face to face time and non-face to face time preparing to see the patient (eg, review of tests), obtaining and/or reviewing separately obtained history, documenting clinical information in the electronic or other health record, independently interpreting results and communicating results to the patient/family/caregiver, or care coordinator.

## 2024-09-06 ENCOUNTER — EXTERNAL HOME HEALTH (OUTPATIENT)
Dept: HOME HEALTH SERVICES | Facility: HOSPITAL | Age: 84
End: 2024-09-06
Payer: MEDICARE

## 2024-09-14 PROBLEM — I70.0 AORTIC ATHEROSCLEROSIS: Status: ACTIVE | Noted: 2024-09-14

## 2024-09-14 NOTE — PROGRESS NOTES
Assessment/Plan:    Problem List Items Addressed This Visit          Neuro    Idiopathic Parkinson's disease    Overview     -managed by neurology  -did not tolerate carbidopa-levodopa  -currently on mirapex             Cardiac/Vascular    Essential hypertension    Overview     Hypertension Medications               furosemide (LASIX) 20 MG tablet Take 20 mg by mouth daily as needed.    metoprolol succinate (TOPROL-XL) 100 MG 24 hr tablet Take 50 mg by mouth once daily.      -at goal today  -continue lifestyle modification with low sodium diet and exercise   -discussed hypertension disease course and importance of treating high blood pressure  -patient understood and advised of risk of untreated blood pressure.  ER precautions were given   for symptoms of hypertensive urgency and emergency.          Aortic atherosclerosis    Overview     -noted on imaging  -remains on daily statin  -on eliquis as well            Endocrine    Type 2 diabetes mellitus with hyperlipidemia    Overview     Diabetes Medications               metFORMIN (GLUCOPHAGE) 500 MG tablet Take 1 tablet (500 mg total) by mouth 2 (two) times daily.        -condition is currently controlled  -hx of AE with Farxiga (yeast); restarted upon d/c but she will discontinue again  -see diabetic health maintenance listed below  -on statin: Yes  -on ACE-I/ARB: No  -counseling provided on importance of diabetic diet and medication compliance in order to treat diabetes  -discussed diabetes disease course and potential complications            Other Visit Diagnoses       Lower extremity edema    -  Primary    Relevant Orders    BNP (Completed)    Hospital discharge follow-up            Visit today included increased complexity associated with the care of the episodic problem(s) addressed and managing the longitudinal care of the patient due to the serious and/or complex managed problem(s). See above assessment/plan.    Padmini Boucher,  MD  _____________________________________________________________________________________________________________________________________________________    CC: hospital discharge follow up    HPI:    Patient is in clinic today as an established patient. Patient recently hospitalized at [facility] for JAIMEE and UTI. D/c summary below:    Admit Date:   8/2/2024 4:31 PM    Discharge Date:   08/05/24    History of Present Illness:     This is an 84-year-old female with past medical history particular significant for hypertension, dyslipidemia, type 2 diabetes mellitus, CAD status post cardiac stenting, A-fib on Eliquis, chronic diastolic CHF, CKD 3, dementia, Parkinson disease, choledocholithiasis status post ERCP/intervention, patient is also status post cholecystectomy, who was admitted to our hospital in April 2024 with left hip fracture status post surgery, postoperative acute blood loss anemia requiring blood transfusion and new onset A-fib with RVR, who presented to our ED complaining of about 3 weeks history of nausea, vomiting (1 episode daily, in the morning), and diarrhea described as 1-2 episodes of watery stools (improving and now loose stools with last bowel movement yesterday).    Patient and family deny any recent travel or sick contacts or recent antibiotic use.    In ED, patient was originally hypotensive at 74/60.  Workup showed creatinine 1.52, BUN 23, , , CO2 21 and positive UA concerning for UTI.  Patient received 1 L of IV fluid bolus and was started on IV fluid infusion, IV Rocephin and IV Zofran.  Her blood pressure has improved and stabilized.  Will place her under medical observation for further management.    Hospital Course and Treatment:     Acute kidney injury superimposed on chronic kidney disease (HCC) [resolved]  Dehydration [resolved]  Patient with history of CKD 3 with a baseline creatinine around 0.8 to 1.  Creatinine today 1.52 with a BUN of 23.  Continue IV hydration  for intravascular volume depletion  Continue to monitor I's and O's  CT abdomen pelvis noted  Renal function continues to improve    Urinary tract infection  Will treat her empirically with IV Rocephin.  Blood cultures negative growth to date  Urine culture showing multidrug-resistant E. coli susceptible to Rocephin  Will continue at home with omnicef for 4 more days to complete a 7 day course    Nausea vomiting and diarrhea [resolved]  We will hydrate her with gentle IV fluids.  Order IV Zofran as needed.  Stool studies pending  CT abdomen pelvis results noted    Hypomagnesemia [resolved]  -Patient with low magnesium noted on labs  -Asymptomatic  -Will continue to monitor and treat accordingly    Hypoalbuminemia  Protein malnutrition  -Patient with low albumin noted on labs  -Likely due to poor p.o. intake rather than intrinsic metabolic disorder  -Continue with dietary consultation  -Increase daily protein supplementation  -Encourage increased p.o. intake     Hypotension [resolved]  Patient with original hypotension in the 70s that improved.  Most likely secondary to dehydration.  Patient with UTI but no SIRS/sepsis criteria. She will be treated with IV fluid and IV Rocephin. Will check lactic acid for completeness. Will check urine and blood cultures.  Recent echo showed a normal ejection fraction.  Blood pressures have improved    Essential hypertension  Continue metoprolol succinate, with holding parameters.  Monitor vital signs and adjust regimen as needed/warranted    Metabolic acidosis [resolved]  CO2 mildly decreased at 21.  Most likely secondary to dehydration/JAIMEE.  Will check lactic acid.  Patient will be hydrated with gentle IV fluid.  Monitor chemistry.    Type 2 diabetes mellitus (HCC)  Will hold oral antidiabetic medications while in the hospital.  Will order Humalog per sliding scale.  If severe/uncontrolled hyperglycemia, will consider adding Lantus.  A1c in May was 5.4.  Monitor blood glucose and  adjust regimen as needed/warranted.    Atrial fibrillation (HCC)  Patient with possible junctional rhythm. Please follow-up official reading.  Monitor on telemetry.  Check repeat EKG.  Continue Amiodarone.  Continue Metoprol succinate, with holding parameters.  Will continue Eliquis.  Cardiology follow-up, inpatient versus outpatient depending on clinical course/workup.    Chronic diastolic CHF (congestive heart failure) (HCC)  Clinically, does not appear to be in acute exacerbation.  Please follow-up chest x-ray (unable to review image and not reported yet).  Will monitor intake and output and measure daily weight, especially that patient will be hydrated with gentle IV fluid.  Echo in April 2024 showed:  The study was technically adequate.  Ejection Fraction = 50-55%.  Left ventricular systolic function is low normal.  Trace to mild MR, mild TR, trace AR, trace LA     I discussed with the patient about the disease process and treatment prior to discharge.     This discharge summary took >30 minutes to complete.     Since d/c:   History of Present Illness  The patient is an 84-year-old female here for a follow-up visit. She is accompanied by her daughter.    She reports feeling well overall, with some improvement in her condition. She was diagnosed with a urinary tract infection (UTI) during a recent emergency room visit. She has been dealing with bladder issues for several years and was prescribed antibiotics, which she continues to take. She denies any current urinary symptoms. She does feel overall better since leaving the hospital.    She has been experiencing constipation, which is unusual for her as she typically has loose bowel movements. She has Senokot at home for constipation relief. Additionally, she has been experiencing nausea after taking her morning medications. She does not always take her medications with food. We discussed making sure she is taking with food and see if the nausea improves.    She  has been experiencing hip pain, particularly when rolling over in bed, which started after her hospital stay. She does not have an appointment scheduled with Dr. Watson, who performed her hip surgery. She is currently receiving outpatient physical therapy for balance issues but has stopped doing physical therapy at home.    She has an upcoming appointment with her cardiologist in 1 to 2 weeks and neurologist Dr. Christy later this month. She has a prescription for Lasix 20 mg to be taken as needed.     All discharge medications have been reviewed and reconciled on chart.     Current Outpatient Medications on File Prior to Visit   Medication Sig Dispense Refill    amiodarone (PACERONE) 200 MG Tab Take 200 mg by mouth once daily.      amitriptyline (ELAVIL) 50 MG tablet TAKE 1 TABLET BY MOUTH EVERY EVENING 90 tablet 2    apixaban (ELIQUIS) 5 mg Tab Take 5 mg by mouth 2 (two) times daily.      atorvastatin (LIPITOR) 80 MG tablet Take 80 mg by mouth.      blood sugar diagnostic (CONTOUR TEST STRIPS) Strp TEST BLOOD GLUCOSE ONCE DAILY 100 each 11    clotrimazole (LOTRIMIN) 1 % cream Apply topically 2 (two) times daily. 45 g 0    cyanocobalamin (VITAMIN B-12) 500 MCG tablet Take 500 mcg by mouth once daily.      diphenoxylate-atropine 2.5-0.025 mg (LOMOTIL) 2.5-0.025 mg per tablet Take 1 tablet by mouth 4 (four) times daily as needed for Diarrhea. 30 tablet 0    ferrous gluconate (FERGON) 324 MG tablet Take 324 mg by mouth daily with breakfast.      fluorometholone 0.1% (FML) 0.1 % DrpS Place 1 drop into both eyes 4 (four) times daily.      fluorouraciL (EFUDEX) 5 % cream Apply 1 application  topically 2 (two) times daily.      folic acid (FOLVITE) 1 MG tablet Take 1 mg by mouth once daily.      lancets (MICROLET LANCET) Misc TEST BLOOD GLUCOSE ONCE DAILY 100 each 3    lifitegrast (XIIDRA) 5 % Dpet Place 1 drop into both eyes 2 (two) times daily.      memantine (NAMENDA) 5 MG Tab Take 5 mg by mouth 2 (two) times daily.       "metFORMIN (GLUCOPHAGE) 500 MG tablet TAKE 1 TABLET BY MOUTH TWICE DAILY 180 tablet 0    metoprolol succinate (TOPROL-XL) 100 MG 24 hr tablet Take 50 mg by mouth once daily.       nystatin (MYCOSTATIN) powder Apply topically 4 (four) times daily. 60 g 0    ondansetron (ZOFRAN) 4 MG tablet Take 1 tablet (4 mg total) by mouth every 8 (eight) hours as needed for Nausea. 30 tablet 0    oxyCODONE-acetaminophen (PERCOCET) 2.5-325 mg per tablet Take 1 tablet by mouth every 8 (eight) hours as needed for Pain.      pramipexole (MIRAPEX) 0.25 MG tablet Take 1 tablet (0.25 mg total) by mouth 3 (three) times daily. 270 tablet 3    senna-docusate 8.6-50 mg (SENNA WITH DOCUSATE SODIUM) 8.6-50 mg per tablet Take 2 tablets by mouth 2 (two) times a day.      tamsulosin (FLOMAX) 0.4 mg Cap Take 0.4 mg by mouth once daily.      furosemide (LASIX) 20 MG tablet Take 20 mg by mouth daily as needed.       No current facility-administered medications on file prior to visit.       Review of Systems   Constitutional:  Negative for chills, diaphoresis, fatigue and fever.   HENT:  Negative for congestion, ear pain, postnasal drip, sinus pain and sore throat.    Eyes:  Negative for pain and redness.   Respiratory:  Negative for cough, chest tightness and shortness of breath.    Cardiovascular:  Negative for chest pain and leg swelling.   Gastrointestinal:  Negative for abdominal pain, constipation, diarrhea, nausea and vomiting.   Genitourinary:  Negative for dysuria and hematuria.   Musculoskeletal:  Negative for arthralgias and joint swelling.   Skin:  Negative for rash.   Neurological:  Negative for dizziness, syncope and headaches.   Psychiatric/Behavioral:  Negative for dysphoric mood. The patient is not nervous/anxious.        Vitals:    08/08/24 0718   BP: 138/67   Pulse: 89   Temp: 97.9 °F (36.6 °C)   Weight: 56.2 kg (124 lb)   Height: 5' 6" (1.676 m)       Wt Readings from Last 3 Encounters:   08/21/24 54.5 kg (120 lb 2.4 oz)   08/08/24 " 56.2 kg (124 lb)   05/02/24 58.1 kg (128 lb)       Physical Exam  Constitutional:       General: She is not in acute distress.     Appearance: Normal appearance. She is well-developed.   HENT:      Head: Normocephalic and atraumatic.   Eyes:      Conjunctiva/sclera: Conjunctivae normal.   Cardiovascular:      Rate and Rhythm: Normal rate and regular rhythm.      Pulses: Normal pulses.      Heart sounds: Normal heart sounds. No murmur heard.  Pulmonary:      Effort: Pulmonary effort is normal. No respiratory distress.      Breath sounds: Normal breath sounds.   Abdominal:      General: Bowel sounds are normal. There is no distension.      Palpations: Abdomen is soft.      Tenderness: There is no abdominal tenderness.   Musculoskeletal:         General: Normal range of motion.      Cervical back: Normal range of motion and neck supple.   Skin:     General: Skin is warm and dry.      Findings: No rash.   Neurological:      General: No focal deficit present.      Mental Status: She is alert and oriented to person, place, and time.   Psychiatric:         Mood and Affect: Mood normal.         Behavior: Behavior normal.         Transitional Care Note    Family and/or Caretaker present at visit?  Yes.  Diagnostic tests reviewed/disposition: I have reviewed all completed as well as pending diagnostic tests at the time of discharge.  Disease/illness education: provided at discharge   Home health/community services discussion/referrals: Patient has home health established.   Establishment or re-establishment of referral orders for community resources: No other necessary community resources.   Discussion with other health care providers: No discussion with other health care providers necessary.     DISCLAIMER: This note was compiled by using a speech recognition dictation system and therefore please be aware that typographical / speech recognition errors can and do occur.  Please contact me if you see any errors  specifically.  Consent was obtained for [employer] recording system prior to the visit.

## 2024-09-20 ENCOUNTER — PATIENT MESSAGE (OUTPATIENT)
Dept: FAMILY MEDICINE | Facility: CLINIC | Age: 84
End: 2024-09-20
Payer: MEDICARE

## 2024-09-21 NOTE — TELEPHONE ENCOUNTER
Refill Routing Note   Medication(s) are not appropriate for processing by [facility] for the following reason(s):        Outside of protocol    [facility] action(s):  Route               Appointments  past 12m or future 3m with PCP    Date Provider   Last Visit   8/8/2024 Padmini Boucher MD   Next Visit   11/11/2024 Padmini Boucher MD   ED visits in past 90 days: 0        Note composed:7:07 PM 09/20/2024

## 2024-09-23 RX ORDER — MEMANTINE HYDROCHLORIDE 5 MG/1
5 TABLET ORAL 2 TIMES DAILY
Qty: 180 TABLET | Refills: 1 | Status: SHIPPED | OUTPATIENT
Start: 2024-09-23 | End: 2025-03-22

## 2024-10-01 ENCOUNTER — DOCUMENT SCAN (OUTPATIENT)
Dept: HOME HEALTH SERVICES | Facility: HOSPITAL | Age: 84
End: 2024-10-01
Payer: MEDICARE

## 2024-10-03 ENCOUNTER — TELEPHONE (OUTPATIENT)
Dept: FAMILY MEDICINE | Facility: CLINIC | Age: 84
End: 2024-10-03

## 2024-10-03 ENCOUNTER — PATIENT MESSAGE (OUTPATIENT)
Dept: FAMILY MEDICINE | Facility: CLINIC | Age: 84
End: 2024-10-03

## 2024-10-03 ENCOUNTER — OFFICE VISIT (OUTPATIENT)
Dept: FAMILY MEDICINE | Facility: CLINIC | Age: 84
End: 2024-10-03
Payer: MEDICARE

## 2024-10-03 DIAGNOSIS — N30.00 ACUTE CYSTITIS WITHOUT HEMATURIA: Primary | ICD-10-CM

## 2024-10-03 DIAGNOSIS — R30.0 DYSURIA: ICD-10-CM

## 2024-10-03 PROCEDURE — 99213 OFFICE O/P EST LOW 20 MIN: CPT | Mod: 95,,, | Performed by: PHYSICIAN ASSISTANT

## 2024-10-03 RX ORDER — NITROFURANTOIN 25; 75 MG/1; MG/1
100 CAPSULE ORAL 2 TIMES DAILY
Qty: 10 CAPSULE | Refills: 0 | Status: SHIPPED | OUTPATIENT
Start: 2024-10-03 | End: 2024-10-08

## 2024-10-03 NOTE — TELEPHONE ENCOUNTER
----- Message from Jose Maria sent at 10/3/2024  1:25 PM CDT -----  Contact: Van/ PARTH RADER  Type:  Patient Requesting a call back     Who Called:Van with [facility]  What is the call back request regarding?:Requesting that todays appointment notes be faxed over to her  Best Call Back Number:[phone]  Additional Information: fax number:[phone]

## 2024-10-03 NOTE — Clinical Note
I put in an order for a urinalysis to be collected by home health. Her daughter said they are using [facility] health. Can we send the orders to them? Thank you.

## 2024-10-03 NOTE — TELEPHONE ENCOUNTER
----- Message from Med Assistant Steven sent at 10/3/2024  2:25 PM CDT -----  Van from [facility] calling to speak with staff to get advice. Says an order was put in for the patient's urine to be collected but she already started the antibiotics. Does the provider still want them to collect it? Please give her a call back at [phone].

## 2024-10-03 NOTE — PROGRESS NOTES
Primary Care Telemedicine Note  The patient location is: Patient Home   The chief complaint leading to consultation is: UTI symptoms  Total time spent with patient: 15 minutes    Visit type: Virtual visit with synchronous audio only and video  Each patient to whom he or she provides medical services by telemedicine is: (1) informed of the relationship between the physician and patient and the respective role of any other health care provider with respect to management of the patient; and (2) notified that he or she may decline to receive medical services by telemedicine and may withdraw from such care at any time.      Assessment/Plan:    Problem List Items Addressed This Visit    None  Visit Diagnoses       Acute cystitis without hematuria    -  Primary    Relevant Medications    nitrofurantoin, macrocrystal-monohydrate, (MACROBID) 100 MG capsule    Dysuria        Relevant Orders    Urinalysis, Reflex to Urine Culture Urine, Clean Catch        -UTI symptoms >1 week  -will order UA to be collected by home health  -start antibiotics as prescribed   -increase hydration with water  -follow up if no improvement in symptoms   -ER precautions for severe or worsening of symptoms     Follow up if symptoms worsen or fail to improve.    Bell Hunter PA-C  _____________________________________________________________________________________________________________________________________________________    CC: UTI symptoms    HPI: Patient is an established patient who presents today via virtual visit for UTI symptoms.    URINARY TRACT INFECTION (UTI):  She reports symptoms of a UTI for approximately one week, including lower abdominal pain, burning with urination and frequent urination. She denies fever. Her home health nurse suspected a UTI. She has a history of UTIs, with a notable hospitalization in August for acute kidney injury 2/2 dehydration and was also found to have a UTI at that time. She has been taking Uribel  for UTI symptoms which has provided some temporary relief. Her home health nurse attempted to collect a urine sample, but was concerned that the Uribel would affect the results. No other complaints today.     Past Medical History:   Diagnosis Date    Allergy     Anxiety     Asthma     Atrial fibrillation 6/10/2024    Coronary artery disease 10/18/2018    DM (diabetes mellitus)     Essential hypertension 10/21/2020    IBS (irritable bowel syndrome)     Migraine headache     Mitral valve prolapse     Type 2 diabetes mellitus with hyperlipidemia 9/6/2016     Past Surgical History:   Procedure Laterality Date    BREAST BIOPSY      BREAST LUMPECTOMY      HYSTERECTOMY      partial    [place] COLONOSCOPY,REMV LESSTEVENSON,FORCEP/CAUTERY  8/23/2012          Review of patient's allergies indicates:   Allergen Reactions    Codeine      Other reaction(s): Unknown  unknown    Diclofenac Nausea And Vomiting    Doxycycline      Other reaction(s): Unknown  unknown    Iodinated contrast media Hives     Pt reports rxn to iv dye years ago,but no rxn to oral contrast    Sulfa (sulfonamide antibiotics)      Other reaction(s): Unknown  unknown     Social History     Tobacco Use    Smoking status: Never    Smokeless tobacco: Never   Substance Use Topics    Alcohol use: No    Drug use: No     Family History   Problem Relation Name Age of Onset    Heart disease Mother      Diabetes Brother      Heart disease Brother      Early death Brother      Breast cancer Maternal Aunt       Current Outpatient Medications on File Prior to Visit   Medication Sig Dispense Refill    amiodarone (PACERONE) 200 MG Tab Take 200 mg by mouth once daily.      amitriptyline (ELAVIL) 50 MG tablet TAKE 1 TABLET BY MOUTH EVERY EVENING 90 tablet 2    apixaban (ELIQUIS) 5 mg Tab Take 5 mg by mouth 2 (two) times daily.      atorvastatin (LIPITOR) 80 MG tablet Take 80 mg by mouth.      blood sugar diagnostic (CONTOUR TEST STRIPS) Strp TEST BLOOD GLUCOSE ONCE DAILY 100 each 11     clotrimazole (LOTRIMIN) 1 % cream Apply topically 2 (two) times daily. 45 g 0    cyanocobalamin (VITAMIN B-12) 500 MCG tablet Take 500 mcg by mouth once daily.      diphenoxylate-atropine 2.5-0.025 mg (LOMOTIL) 2.5-0.025 mg per tablet Take 1 tablet by mouth 4 (four) times daily as needed for Diarrhea. 30 tablet 0    ferrous gluconate (FERGON) 324 MG tablet Take 324 mg by mouth daily with breakfast.      fluorometholone 0.1% (FML) 0.1 % DrpS Place 1 drop into both eyes 4 (four) times daily.      fluorouraciL (EFUDEX) 5 % cream Apply 1 application  topically 2 (two) times daily.      folic acid (FOLVITE) 1 MG tablet Take 1 mg by mouth once daily.      furosemide (LASIX) 20 MG tablet Take 20 mg by mouth daily as needed.      lancets (MICROLET LANCET) Misc TEST BLOOD GLUCOSE ONCE DAILY 100 each 3    lifitegrast (XIIDRA) 5 % Dpet Place 1 drop into both eyes 2 (two) times daily.      memantine (NAMENDA) 5 MG Tab Take 1 tablet (5 mg total) by mouth 2 (two) times daily. 180 tablet 1    metFORMIN (GLUCOPHAGE) 500 MG tablet TAKE 1 TABLET BY MOUTH TWICE DAILY 180 tablet 0    metoprolol succinate (TOPROL-XL) 100 MG 24 hr tablet Take 50 mg by mouth once daily.       nystatin (MYCOSTATIN) powder Apply topically 4 (four) times daily. 60 g 0    ondansetron (ZOFRAN) 4 MG tablet Take 1 tablet (4 mg total) by mouth every 8 (eight) hours as needed for Nausea. 30 tablet 0    oxyCODONE-acetaminophen (PERCOCET) 2.5-325 mg per tablet Take 1 tablet by mouth every 8 (eight) hours as needed for Pain.      pramipexole (MIRAPEX) 0.25 MG tablet Take 1 tablet (0.25 mg total) by mouth 3 (three) times daily. 270 tablet 3    QUEtiapine (SEROQUEL) 100 MG Tab Take 1 tablet (100 mg total) by mouth every evening. 90 tablet 3    senna-docusate 8.6-50 mg (SENNA WITH DOCUSATE SODIUM) 8.6-50 mg per tablet Take 2 tablets by mouth 2 (two) times a day.      tamsulosin (FLOMAX) 0.4 mg Cap Take 0.4 mg by mouth once daily.       No current facility-administered  medications on file prior to visit.       Review of Systems   Constitutional:  Positive for chills. Negative for fever and malaise/fatigue.   Respiratory:  Negative for cough and shortness of breath.    Cardiovascular:  Negative for chest pain, palpitations and leg swelling.   Gastrointestinal:  Positive for abdominal pain and nausea. Negative for constipation and diarrhea.   Genitourinary:  Positive for dysuria and frequency.   Musculoskeletal:  Negative for back pain and joint pain.   Neurological:  Negative for headaches.   Psychiatric/Behavioral:  Negative for depression. The patient is not nervous/anxious.          Physical Exam  Constitutional:       General: She is not in acute distress.     Appearance: She is well-developed.   HENT:      Head: Normocephalic and atraumatic.   Pulmonary:      Effort: Pulmonary effort is normal. No respiratory distress.   Abdominal:      General: There is no distension.   Musculoskeletal:         General: Normal range of motion.      Cervical back: Normal range of motion.   Neurological:      Mental Status: She is alert and oriented to person, place, and time.   Psychiatric:         Mood and Affect: Mood normal.         Behavior: Behavior normal.         The patient's Health Maintenance was reviewed and the following appears to be due at this time:  Health Maintenance Due   Topic Date Due    TETANUS VACCINE  Never done    Shingles Vaccine (1 of 2) Never done    RSV Vaccine (Age 60+ and Pregnant patients) (1 - 1-dose 75+ series) Never done    Eye Exam  09/30/2023    DEXA Scan  10/30/2023    Diabetes Urine Screening  01/12/2024    Influenza Vaccine (1) 09/01/2024    COVID-19 Vaccine (5 - 2024-25 season) 09/01/2024     DISCLAIMER: This note was compiled by using a speech recognition dictation system and therefore please be aware that typographical / speech recognition errors can and do occur.  Please contact me if you see any errors specifically.  Consent was obtained for  [employer] recording system prior to the visit.

## 2024-10-09 ENCOUNTER — PATIENT MESSAGE (OUTPATIENT)
Dept: NEUROLOGY | Facility: CLINIC | Age: 84
End: 2024-10-09

## 2024-10-09 ENCOUNTER — OFFICE VISIT (OUTPATIENT)
Dept: NEUROLOGY | Facility: CLINIC | Age: 84
End: 2024-10-09
Payer: MEDICARE

## 2024-10-09 DIAGNOSIS — Z71.89 ADVANCE CARE PLANNING: ICD-10-CM

## 2024-10-09 DIAGNOSIS — R25.1 TREMOR: ICD-10-CM

## 2024-10-09 DIAGNOSIS — I50.20 HFREF (HEART FAILURE WITH REDUCED EJECTION FRACTION): ICD-10-CM

## 2024-10-09 DIAGNOSIS — G47.00 INSOMNIA, UNSPECIFIED TYPE: ICD-10-CM
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
## 2024-10-09 DIAGNOSIS — E78.5 HYPERLIPIDEMIA ASSOCIATED WITH TYPE 2 DIABETES MELLITUS: ICD-10-CM

## 2024-10-09 DIAGNOSIS — N30.20 CHRONIC CYSTITIS: ICD-10-CM

## 2024-10-09 DIAGNOSIS — M85.89 OSTEOPENIA OF MULTIPLE SITES: ICD-10-CM

## 2024-10-09 DIAGNOSIS — E78.5 TYPE 2 DIABETES MELLITUS WITH HYPERLIPIDEMIA: ICD-10-CM

## 2024-10-09 DIAGNOSIS — R29.90 MULTIPLE NEUROLOGICAL SYMPTOMS: ICD-10-CM

## 2024-10-09 DIAGNOSIS — I10 ESSENTIAL HYPERTENSION: ICD-10-CM
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
## 2024-10-09 DIAGNOSIS — R54 FRAIL ELDERLY: Chronic | ICD-10-CM

## 2024-10-09 DIAGNOSIS — Z91.81 STATUS POST FALL: ICD-10-CM

## 2024-10-09 DIAGNOSIS — E55.9 VITAMIN D DEFICIENCY: ICD-10-CM

## 2024-10-09 DIAGNOSIS — E11.69 HYPERLIPIDEMIA ASSOCIATED WITH TYPE 2 DIABETES MELLITUS: ICD-10-CM

## 2024-10-09 DIAGNOSIS — Z91.81 AT HIGH RISK FOR FALLS: ICD-10-CM

## 2024-10-09 DIAGNOSIS — F41.9 ANXIETY: ICD-10-CM

## 2024-10-09 DIAGNOSIS — G20.C PRIMARY PARKINSONISM: Primary | ICD-10-CM

## 2024-10-09 DIAGNOSIS — E11.69 TYPE 2 DIABETES MELLITUS WITH HYPERLIPIDEMIA: ICD-10-CM

## 2024-10-09 DIAGNOSIS — I25.10 CORONARY ARTERY DISEASE INVOLVING NATIVE CORONARY ARTERY OF NATIVE HEART WITHOUT ANGINA PECTORIS: ICD-10-CM

## 2024-10-09 DIAGNOSIS — R44.1 VISUAL HALLUCINATIONS: ICD-10-CM

## 2024-10-09 DIAGNOSIS — I48.0 PAROXYSMAL ATRIAL FIBRILLATION: ICD-10-CM

## 2024-10-09 DIAGNOSIS — R00.2 PALPITATIONS: ICD-10-CM

## 2024-10-09 DIAGNOSIS — G43.009 MIGRAINE WITHOUT AURA AND WITHOUT STATUS MIGRAINOSUS, NOT INTRACTABLE: ICD-10-CM

## 2024-10-09 DIAGNOSIS — I70.0 AORTIC ATHEROSCLEROSIS: ICD-10-CM

## 2024-10-09 DIAGNOSIS — K58.0 IRRITABLE BOWEL SYNDROME WITH DIARRHEA: ICD-10-CM

## 2024-10-09 DIAGNOSIS — Z96.642 STATUS POST LEFT HIP REPLACEMENT: ICD-10-CM
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
## 2024-10-09 PROCEDURE — 99215 OFFICE O/P EST HI 40 MIN: CPT | Mod: 95,,, | Performed by: PSYCHIATRY & NEUROLOGY

## 2024-10-09 RX ORDER — RIVASTIGMINE 4.6 MG/24H
1 PATCH, EXTENDED RELEASE TRANSDERMAL DAILY
COMMUNITY
Start: 2024-10-09 | End: 2024-10-09 | Stop reason: SDUPTHER

## 2024-10-09 RX ORDER — TAMSULOSIN HYDROCHLORIDE 0.4 MG/1
0.4 CAPSULE ORAL DAILY
Qty: 90 CAPSULE | Refills: 3 | Status: SHIPPED | OUTPATIENT
Start: 2024-10-09 | End: 2024-10-09

## 2024-10-09 RX ORDER — RIVASTIGMINE 4.6 MG/24H
1 PATCH, EXTENDED RELEASE TRANSDERMAL DAILY
Qty: 30 PATCH | Refills: 11 | Status: SHIPPED | OUTPATIENT
Start: 2024-10-09

## 2024-10-09 RX ORDER — OLANZAPINE 5 MG/1
5 TABLET ORAL NIGHTLY
COMMUNITY
Start: 2024-10-08

## 2024-10-09 RX ORDER — MEMANTINE HYDROCHLORIDE 10 MG/1
10 TABLET ORAL 2 TIMES DAILY
COMMUNITY
Start: 2024-10-08

## 2024-10-09 NOTE — TELEPHONE ENCOUNTER
Spoke with pt's daughter regarding recommendations from [facility]. Pt scheduled virtually with Dr. Boucher on Monday.

## 2024-10-09 NOTE — PROGRESS NOTES
Subjective:       Patient ID: Alon Chatman is a 84 y.o. female.    Chief Complaint: No chief complaint on file.          HPI        BACKGROUND HISTORY       The patient started having tremors in 2022.  The patient's tremors are strictly in the LUE and LLE. No head tremors. No voice tremors. The tremors seem to be mainly at rest and during activity. The patient's gait slowed down. Postural stability is impaired alongside with occasional mild postural lightheadedness. The patient also complains of muscle stiffness. No hallucinations. No delusions. No alteration of mental status.  No language or communication problems. No muscle weakness or twitching. No trouble swallowing. No eye movement difficulty. The patient's voice tone and volume have decreased. In addition, the facial expressions have declined. The handwriting has gotten smaller. No history of TBI. No history of urine incontinence. No history of memory loss. No  drooling and no trouble swallowing. The patient reports reactive depression. Denies spells of sudden laughing but they seem to be unprovoked. No suicidal or homicidal ideations. No antipsychotic or antiemetic meds use for prolonged time. No history of CO poisoning. No family history of similar symptoms. The patient denies history of strokes. No history of occupational toxic exposure. No history of chronic liver disease. Ordered Jackie Scan and started her on CD/LD 25/100 mg QID.I also recommended PT. On 04- Jackie Scan is abnormal and confirms Primary Parkinsonism like Parkinson's disease. . Could not tolerate CD/LD due to N/V. Tolerated Mirapex 0.125 mg TID which's less effective than CD/LD. The patient has enjoyed and benefited tremendously from PT.Continued Pramipexole (Mirapex) and changed from 0.125 mg TID to 0.25 mg TID.   She is complaining of trouble sleeping despite being on Amitriptyline 50 mg QHS which she used for decades and eradicated her migraines and daughter reported some  instances of VH and it remains unclear if she was doubling her medication (Mirapex) so I tried  Quetiapine (Seroquel) 25 mg PO QHS.Had a fall in  and underwent LT PHR and started on Namenda 5 mg BID while in rehab. Quetiapine (Seroquel) 25 mg PO QHS has helped but still struggles with VH and Insomnia.        INTERVAL HISTORY       Accompanied by daughter on 10- for follow up.    Was found down in the bathroom on 10-.  During hospitalization Pramipexole(Mirapex) was stopped, Quetiapine (Seroquel) was replaced  with Olanzapine (Zyprex) 5 mg QHS, Namenda was changed to  10 mg BID. Seems to be doing better.              The originating site (patient location) is: Home.      The distant site (neurologist location) is: [facility] at [facility].      The chief complaint leading to consultation is: PD      Visit type: Virtual visit with synchronous audio and video.      Consent: The patient verbally consented to participating in the video visit and informed that may decline to receive medical services by telemedicine and may withdraw from such care at any time.      I discussed with the patient the nature of our telemedicine visits, that:      I  would evaluate the patient and recommend diagnostics and treatments based on my assessment.    Our sessions are not being recorded and that personal health information is protected.    Our team would provide follow up care in person if/when the patient needs it.    Virtual (video/telemedicine) visits have significant limitations. A telemedicine exam is primarily focused on the history and what I can observe. Several critical parts of the neurological exam cannot be performed.         Review of Systems   Constitutional:  Negative for appetite change and fatigue.   HENT:  Positive for hearing loss. Negative for tinnitus.    Eyes:  Negative for photophobia and visual disturbance.   Respiratory:  Negative for apnea and shortness of breath.     Cardiovascular:  Negative for chest pain and palpitations.   Gastrointestinal:  Negative for nausea and vomiting.   Endocrine: Negative for cold intolerance and heat intolerance.   Genitourinary:  Negative for difficulty urinating and urgency.   Musculoskeletal:  Positive for gait problem. Negative for arthralgias, back pain, joint swelling, myalgias, neck pain and neck stiffness.   Skin:  Negative for color change and rash.   Allergic/Immunologic: Negative for environmental allergies and immunocompromised state.   Neurological:  Positive for tremors and speech difficulty. Negative for dizziness, seizures, syncope, facial asymmetry, weakness, light-headedness, numbness and headaches.   Hematological:  Negative for adenopathy. Does not bruise/bleed easily.   Psychiatric/Behavioral:  Negative for agitation, behavioral problems, confusion, decreased concentration, dysphoric mood, hallucinations, self-injury, sleep disturbance and suicidal ideas. The patient is nervous/anxious. The patient is not hyperactive.                  Current Outpatient Medications:     amiodarone (PACERONE) 200 MG Tab, Take 200 mg by mouth once daily., Disp: , Rfl:     amitriptyline (ELAVIL) 50 MG tablet, TAKE 1 TABLET BY MOUTH EVERY EVENING, Disp: 90 tablet, Rfl: 2    apixaban (ELIQUIS) 5 mg Tab, Take 5 mg by mouth 2 (two) times daily., Disp: , Rfl:     atorvastatin (LIPITOR) 80 MG tablet, Take 80 mg by mouth., Disp: , Rfl:     blood sugar diagnostic (CONTOUR TEST STRIPS) Strp, TEST BLOOD GLUCOSE ONCE DAILY, Disp: 100 each, Rfl: 11    clotrimazole (LOTRIMIN) 1 % cream, Apply topically 2 (two) times daily., Disp: 45 g, Rfl: 0    cyanocobalamin (VITAMIN B-12) 500 MCG tablet, Take 500 mcg by mouth once daily., Disp: , Rfl:     diphenoxylate-atropine 2.5-0.025 mg (LOMOTIL) 2.5-0.025 mg per tablet, Take 1 tablet by mouth 4 (four) times daily as needed for Diarrhea., Disp: 30 tablet, Rfl: 0    ferrous gluconate (FERGON) 324 MG tablet, Take 324 mg  by mouth daily with breakfast., Disp: , Rfl:     fluorometholone 0.1% (FML) 0.1 % DrpS, Place 1 drop into both eyes 4 (four) times daily., Disp: , Rfl:     fluorouraciL (EFUDEX) 5 % cream, Apply 1 application  topically 2 (two) times daily., Disp: , Rfl:     folic acid (FOLVITE) 1 MG tablet, Take 1 mg by mouth once daily., Disp: , Rfl:     furosemide (LASIX) 20 MG tablet, Take 20 mg by mouth daily as needed., Disp: , Rfl:     lancets (MICROLET LANCET) Misc, TEST BLOOD GLUCOSE ONCE DAILY, Disp: 100 each, Rfl: 3    lifitegrast (XIIDRA) 5 % Dpet, Place 1 drop into both eyes 2 (two) times daily., Disp: , Rfl:     memantine (NAMENDA) 5 MG Tab, Take 1 tablet (5 mg total) by mouth 2 (two) times daily., Disp: 180 tablet, Rfl: 1    metFORMIN (GLUCOPHAGE) 500 MG tablet, TAKE 1 TABLET BY MOUTH TWICE DAILY, Disp: 180 tablet, Rfl: 0    metoprolol succinate (TOPROL-XL) 100 MG 24 hr tablet, Take 50 mg by mouth once daily. , Disp: , Rfl:     nystatin (MYCOSTATIN) powder, Apply topically 4 (four) times daily., Disp: 60 g, Rfl: 0    ondansetron (ZOFRAN) 4 MG tablet, Take 1 tablet (4 mg total) by mouth every 8 (eight) hours as needed for Nausea., Disp: 30 tablet, Rfl: 0    oxyCODONE-acetaminophen (PERCOCET) 2.5-325 mg per tablet, Take 1 tablet by mouth every 8 (eight) hours as needed for Pain., Disp: , Rfl:     pramipexole (MIRAPEX) 0.25 MG tablet, Take 1 tablet (0.25 mg total) by mouth 3 (three) times daily., Disp: 270 tablet, Rfl: 3    QUEtiapine (SEROQUEL) 100 MG Tab, Take 1 tablet (100 mg total) by mouth every evening., Disp: 90 tablet, Rfl: 3    senna-docusate 8.6-50 mg (SENNA WITH DOCUSATE SODIUM) 8.6-50 mg per tablet, Take 2 tablets by mouth 2 (two) times a day., Disp: , Rfl:     tamsulosin (FLOMAX) 0.4 mg Cap, Take 1 capsule (0.4 mg total) by mouth once daily., Disp: 90 capsule, Rfl: 3  No current facility-administered medications for this visit.        Past Medical History:   Diagnosis Date    Advance care planning 8/2/2024     Allergy     Anxiety     Asthma     Atrial fibrillation 6/10/2024    Coronary artery disease 10/18/2018    DM (diabetes mellitus)     Essential hypertension 10/21/2020    IBS (irritable bowel syndrome)     Migraine headache     Mitral valve prolapse     Type 2 diabetes mellitus with hyperlipidemia 9/6/2016     Past Surgical History:   Procedure Laterality Date    BREAST BIOPSY      BREAST LUMPECTOMY      HYSTERECTOMY      partial    MT COLONOSCOPY,REMV LESN,FORCEP/CAUTERY  8/23/2012          Social History     Socioeconomic History    Marital status:    Tobacco Use    Smoking status: Never    Smokeless tobacco: Never   Substance and Sexual Activity    Alcohol use: No    Drug use: No    Sexual activity: Not Currently     Social Drivers of Health     Financial Resource Strain: Low Risk  (10/3/2024)    Overall Financial Resource Strain (CARDIA)     Difficulty of Paying Living Expenses: Not very hard   Food Insecurity: Unknown (10/5/2024)    Received from [facility]    Hunger Vital Sign     Ran Out of Food in the Last Year: Never true   Transportation Needs: Unknown (10/5/2024)    Received from [facility]    PRAPARE - Transportation     Lack of Transportation (Medical): No     Lack of Transportation (Non-Medical): Patient declined   Physical Activity: Inactive (10/3/2024)    Exercise Vital Sign     Days of Exercise per Week: 0 days     Minutes of Exercise per Session: 0 min   Stress: Stress Concern Present (10/3/2024)    [employer] - Occupational Stress Questionnaire     Feeling of Stress : Rather much   Housing Stability: Unknown (10/5/2024)    Received from [facility]    Housing Stability Vital Sign     Homeless in the Last Year: No             Past/Current Medical/Surgical History, Past/Current Social History, Past/Current Family History and Past/Current Medications were reviewed in detail.        Objective:               PLEASE NOTE  THAT THE FOLLOWING PHYSICAL AND NEUROLOGICAL EXAMINATION IS BASED UPON THE LAST FACE-TO-FACE VISIT WITH THE PATIENT       TELEMETRIC (VIRTUAL) VISITS DO NOT ALLOW PROPER PHYSICAL AND NEUROLOGICAL EXAMINATIONS             VITAL SIGNS WERE REVIEWED      GENERAL APPEARANCE:     The patient looks comfortable.    BMI 20.98     No signs of respiratory distress.    Normal breathing pattern.    No dysmorphic features    Normal eye contact.       GENERAL MEDICAL EXAM:    HEENT:  Head is atraumatic normocephalic.     FUNDUSCOPIC (OPHTHALMOSCOPIC) EXAMINATION showed no disc edema.      NECK: No JVD. No visible lesions or goiters.     CHEST-CARDIOPULMONARY: No cyanosis. No tachypnea. Normal respiratory effort.    [id number]: No jaundice. No stomas or lesions. No visible hernias. No catheters.     SKIN, HAIR, NAILS: No pathognomonic skin rash.No neurofibromatosis. No visible lesions.No stigmata of autoimmune disease. No clubbing.    LIMBS: No varicose veins. No visible swelling.    MUSCULOSKELETAL: No visible deformities.No visible lesions.           Neurologic Exam     Mental Status   Oriented to person, place, and time.   Follows 3 step commands.   Attention: normal. Concentration: normal.   Speech: speech is normal   Level of consciousness: alert  Able to name object. Able to repeat. Normal comprehension.     Cranial Nerves   Cranial nerves II through XII intact.     CN II   Visual fields full to confrontation.   Visual acuity: normal  Right visual field deficit: none  Left visual field deficit: none     CN III, IV, VI   Pupils are equal, round, and reactive to light.  Extraocular motions are normal.   Right pupil: Size: 2 mm. Shape: regular. Reactivity: brisk. Consensual response: intact. Accommodation: intact.   Left pupil: Size: 2 mm. Shape: regular. Reactivity: brisk. Consensual response: intact. Accommodation: intact.   CN III: no CN III palsy  CN VI: no CN VI palsy  Nystagmus: none   Diplopia:  none  Ophthalmoparesis: none  Upgaze: normal  Downgaze: normal  Conjugate gaze: present  Vestibulo-ocular reflex: present    CN V   Facial sensation intact.   Right facial sensation deficit: none  Left facial sensation deficit: none  Jaw jerk: normal    CN VII   Facial expression full, symmetric.   Right facial weakness: none  Left facial weakness: none    CN VIII   CN VIII normal.   Hearing: impaired    CN IX, X   CN IX normal.   CN X normal.   Palate: symmetric    CN XI   CN XI normal.   Right sternocleidomastoid strength: normal  Left sternocleidomastoid strength: normal  Right trapezius strength: normal  Left trapezius strength: normal    CN XII   CN XII normal.   Tongue: not atrophic  Fasciculations: absent  Tongue deviation: none    Motor Exam   Muscle bulk: normal  Overall muscle tone: normal  Right arm tone: normal  Left arm tone: normal  Right arm pronator drift: absent  Left arm pronator drift: absent  Right leg tone: normal  Left leg tone: normal    Strength   Strength 5/5 throughout.   Right neck flexion: 5/5  Left neck flexion: 5/5  Right neck extension: 5/5  Left neck extension: 5/5  Right deltoid: 5/5  Left deltoid: 5/5  Right biceps: 5/5  Left biceps: 5/5  Right triceps: 5/5  Left triceps: 5/5  Right wrist flexion: 5/5  Left wrist flexion: 5/5  Right wrist extension: 5/5  Left wrist extension: 5/5  Right interossei: 5/5  Left interossei: 5/5  Right iliopsoas: 5/5  Left iliopsoas: 5/5  Right quadriceps: 5/5  Left quadriceps: 5/5  Right hamstrin/5  Left hamstrin/5  Right glutei: 5/5  Left glutei: 5/5  Right anterior tibial: 5/5  Left anterior tibial: 5/5  Right posterior tibial: 5/5  Left posterior tibial: 5/5  Right peroneal: 5/5  Left peroneal: 5/5  Right gastroc: 5/5  Left gastroc: 5/5    Sensory Exam   Light touch normal.   Right arm light touch: normal  Left arm light touch: normal  Right leg light touch: normal  Left leg light touch: normal  Right arm vibration: normal  Left arm  vibration: normal  Right leg vibration: decreased from toes  Left leg vibration: decreased from toes  Proprioception normal.   Right arm proprioception: normal  Left arm proprioception: normal  Right leg proprioception: normal  Left leg proprioception: normal  Pinprick normal.   Right arm pinprick: normal  Left arm pinprick: normal  Right leg pinprick: normal  Left leg pinprick: normal  Graphesthesia: normal  Stereognosis: normal    Gait, Coordination, and Reflexes     Gait  Gait: shuffling (Antalgic)    Coordination   Finger to nose coordination: normal    Tremor   Resting tremor: present (LUE LLE)  Intention tremor: absent  Action tremor: absent    Reflexes   Right brachioradialis: 2+  Left brachioradialis: 2+  Right biceps: 2+  Left biceps: 2+  Right triceps: 2+  Left triceps: 2+  Right patellar: 2+  Left patellar: 2+  Right achilles: 1+  Left achilles: 1+  Right plantar: normal  Left plantar: normal  Right Olivo: absent  Left Olivo: absent  Right ankle clonus: absent  Left ankle clonus: absent  Right pendular knee jerk: absent  Left pendular knee jerk: absent    EXTRAPYRAMIDAL EXAMINATION FOR PARKINSONISM     KEY: 0 NL, 1 MILD, 2 MODERATE, 3 SEVERE, 4 ADVANCED.       HEAD AND NECK AND CRANIAL NERVES EXAMINATION:       Facial expressions: Mild Hypomimia.     Visual Acuity: Decreased.     Smell: Mild Anosmia.    Voice: Significant Hypophonia.     Swallowing: No Dysphagia or Sialorrhea.       NON-MOTOR EXAMINATION:      Autonomic: Mild Orthostasis    Mood: Reactive Depression    Emotional continence: No PBA.     Cognition: No Amnesia.    Behavior: VH      INVOLUNTARY MOVEMENTS:     No Rest Tremor in the RUE     3 Hz  2/4 Rest Tremor in the LUE.     No Rest Tremor in the RLE     3 Hz 2/4 Rest Tremor in the LLE      TONE EXAMINATION:     No Nuchal Rigidity    No Appendicular Rigidity and Paratonia in the RUE    No Appendicular Rigidity and Paratonia in the LUE.     No appendicular Rigidity or Paratonia in the  RLE    No appendicular Rigidity and Paratonia in the LLE.      BRADYKINESIA EXAMINATION:     Rapid Alternating Movements (Elza) are normal in the RT    Rapid Alternating Movements (Elza) are normal in the LT    Positive micrographia.      STANCE, POSTURAL STABILITY AND GAIT:     The patient was unable to stand up with crossed arms without difficulty.    Postural stability by Retropulsion and Propulsion is 2/4 abnormal.     There is evidence of 2/4  gait apraxia, festination/shuffling and gait rigidity.                  Lab Results   Component Value Date    WBC 11.8 (H) 04/05/2024    HGB 10.1 (L) 04/05/2024    HCT 31.3 (L) 04/05/2024    MCV 93.2 04/05/2024     04/05/2024     Sodium   Date Value Ref Range Status   10/05/2024 140 136 - 144 mmol/L Final   08/08/2024 139 136 - 145 mmol/L Final     Potassium   Date Value Ref Range Status   10/05/2024 4.2 3.6 - 5.1 mmol/L Final   08/08/2024 4.3 3.5 - 5.1 mmol/L Final     Chloride   Date Value Ref Range Status   08/08/2024 109 95 - 110 mmol/L Final     CO2   Date Value Ref Range Status   10/05/2024 20 (L) 22 - 32 mmol/L Final   08/08/2024 22 (L) 23 - 29 mmol/L Final     Glucose   Date Value Ref Range Status   08/08/2024 109 70 - 110 mg/dL Final     BUN   Date Value Ref Range Status   08/08/2024 10 8 - 23 mg/dL Final     Blood Urea Nitrogen   Date Value Ref Range Status   10/05/2024 23 (H) 8 - 20 mg/dL Final     Creatinine   Date Value Ref Range Status   10/05/2024 1.06 0.60 - 1.10 mg/dL Final   08/08/2024 0.9 0.5 - 1.4 mg/dL Final     Calcium   Date Value Ref Range Status   10/05/2024 8.7 (L) 8.9 - 10.3 mg/dL Final   08/08/2024 9.1 8.7 - 10.5 mg/dL Final     Total Protein   Date Value Ref Range Status   10/04/2024 7 6.1 - 7.9 g/dL Final   08/08/2024 6.2 6.0 - 8.4 g/dL Final     Albumin   Date Value Ref Range Status   10/04/2024 4.1 3.5 - 4.8 g/dL Final   08/08/2024 3.2 (L) 3.5 - 5.2 g/dL Final     Total Bilirubin   Date Value Ref Range Status   10/04/2024 1.2 0.4 -  2.0 mg/dL Final   08/08/2024 0.5 0.1 - 1.0 mg/dL Final     Comment:     For infants and newborns, interpretation of results should be based  on gestational age, weight and in agreement with clinical  observations.    Premature Infant recommended reference ranges:  Up to 24 hours.............<8.0 mg/dL  Up to 48 hours............<12.0 mg/dL  3-5 days..................<15.0 mg/dL  6-29 days.................<15.0 mg/dL       Alkaline Phosphatase   Date Value Ref Range Status   10/04/2024 68 28 - 116 U/L Final   08/08/2024 55 55 - 135 U/L Final     AST   Date Value Ref Range Status   10/04/2024 42 (H) 10 - 34 U/L Final   08/08/2024 34 10 - 40 U/L Final     ALT   Date Value Ref Range Status   10/04/2024 24 5 - 41 U/L Final   08/08/2024 55 (H) 10 - 44 U/L Final     Anion Gap   Date Value Ref Range Status   10/05/2024 10 7 - 16 mmol/L Final   08/08/2024 8 8 - 16 mmol/L Final     eGFR if    Date Value Ref Range Status   04/29/2022 >60.0 >60 mL/min/1.73 m^2 Final     eGFR if non    Date Value Ref Range Status   04/29/2022 53.3 (A) >60 mL/min/1.73 m^2 Final     Comment:     Calculation used to obtain the estimated glomerular filtration  rate (eGFR) is the CKD-EPI equation.          Lab Results   Component Value Date    TSH 3.358 03/29/2023             LABORATORY EVALUATION         [id number]    CBC, CMP, TFT, HA1C only remarkable T2DM and Recurrent UTI        RADIOLOGY EVALUATION       04-    Jackie Scan is abnormal and confirms Primary Parkinsonism like Parkinson's disease.       04-      CTH   No acute intracranial process.     10-    CTH Midline high parietal scalp hematoma without underlying skull fracture.  No acute intracranial process.     Reviewed the neuroimaging independently       Assessment:         1. Primary parkinsonism    2. Irritable bowel syndrome with diarrhea    3. Anxiety    4. Type 2 diabetes mellitus with hyperlipidemia    5. Coronary artery disease  involving native coronary artery of native heart without angina pectoris    6. Migraine without aura and without status migrainosus, not intractable    7. Essential hypertension    8. Osteopenia of multiple sites    9. Hyperlipidemia associated with type 2 diabetes mellitus    10. Chronic cystitis    11. HFrEF (heart failure with reduced ejection fraction)    12. Palpitations    13. Tremor    14. Multiple neurological symptoms    15. Insomnia, unspecified type    16. Visual hallucinations    17. Vitamin D deficiency    18. Paroxysmal atrial fibrillation    19. Aortic atherosclerosis    20. Status post left hip replacement    21. Status post fall    22. Advance care planning    23. At high risk for falls    24. Fall in home, sequela    25. Frail elderly            Plan:             IDIOPATHIC PARKINSON'S DISEASE (IPD), LEFT SIDE ONSET 2022  WITH DEMENTIA VS. LEWY BODY DEMENTIA (LBD)        PRECAUTIONS, MONITORING    Avoid driving.    Falling down precautions.    Needs 24/7 care.      PHARMACOTHERAPY       DOPAMINERGIC MEDICATIONS:       Will avoid the class of medication.    Failed Pramipexole (Mirapex) due to VH    Failed CD/LD due to N/V.          NON-MOTOR SYMPTOMS        DYSPHAGIA-SIALORRHEA    Not an issue.      ORTHOSTASIS, RECURRENT FALLS     Monitor clinically.     Orthostatic measures.     D/C Flomax.    D/C Lasix.    D/C Amitriptyline.        COGNITIVE DYSFUNCTION WITH HALLUCINATIONS       Continue Namenda 10 mg BID.    Rivastigmine (Exelon) patch 4.6 mg QD.    Continue Olanzapine (Zyprexa) 5 mg QHS.    Failed Quetiapine (Seroquel) to 100 mg PO QHS.                  MEDICAL/SURGICAL COMORBIDITIES     All relevant medical comorbidities noted and managed by primary care physician and medical care team.          HEALTHY LIFESTYLE AND PREVENTATIVE CARE    The patient to adhere to the age-appropriate health maintenance guidelines including screening tests and vaccinations. The patient to adhere to  healthy  lifestyle, optimal weight, exercise, healthy diet, good sleep hygiene and avoiding drugs including smoking, alcohol and recreational drugs.          RTC 6 MONTHS         Herberth Christy MD, FAAN    Attending Neurologist/Epileptologist         Diplomate, American Board of Psychiatry and Neurology    Diplomate, American Board of Clinical Neurophysiology     Fellow, American Academy of Neurology     I spent a total of 41 minutes on the day of the visit.  This includes face to face time and non-face to face time preparing to see the patient (eg, review of tests), obtaining and/or reviewing separately obtained history, documenting clinical information in the electronic or other health record, independently interpreting results and communicating results to the patient/family/caregiver, or care coordinator.

## 2024-10-09 NOTE — TELEPHONE ENCOUNTER
I have signed for the following orders AND/OR meds.  Please call the patient and ask the patient to schedule the testing AND/OR inform about any medications that were sent. Medications have been sent to pharmacy listed below      No orders of the defined types were placed in this encounter.      Medications Ordered This Encounter   Medications    tamsulosin (FLOMAX) 0.4 mg Cap     Sig: Take 1 capsule (0.4 mg total) by mouth once daily.     Dispense:  90 capsule     Refill:  3         [id number]  [place] - [address] [address]  Phone: [phone] Fax: [phone]    [id number] - [place] - [address]  [address] [place] [address]  Phone: [phone] Fax: [phone]

## 2024-10-14 ENCOUNTER — OFFICE VISIT (OUTPATIENT)
Dept: FAMILY MEDICINE | Facility: CLINIC | Age: 84
End: 2024-10-14
Payer: MEDICARE

## 2024-10-14 VITALS — BODY MASS INDEX: 17.43 KG/M2 | WEIGHT: 108 LBS

## 2024-10-14 DIAGNOSIS — G31.84 MCI (MILD COGNITIVE IMPAIRMENT): ICD-10-CM

## 2024-10-14 DIAGNOSIS — R42 DIZZINESS: ICD-10-CM

## 2024-10-14 DIAGNOSIS — E87.20 METABOLIC ACIDOSIS: ICD-10-CM

## 2024-10-14 DIAGNOSIS — G20.C PRIMARY PARKINSONISM: ICD-10-CM

## 2024-10-14 DIAGNOSIS — Z09 HOSPITAL DISCHARGE FOLLOW-UP: Primary | ICD-10-CM

## 2024-10-14 PROCEDURE — 99214 OFFICE O/P EST MOD 30 MIN: CPT | Mod: 95,,, | Performed by: INTERNAL MEDICINE

## 2024-10-14 RX ORDER — NAPROXEN SODIUM 220 MG/1
81 TABLET, FILM COATED ORAL DAILY
COMMUNITY
Start: 2024-10-09

## 2024-10-14 RX ORDER — TAMSULOSIN HYDROCHLORIDE 0.4 MG/1
1 CAPSULE ORAL DAILY
COMMUNITY

## 2024-10-14 RX ORDER — DAPAGLIFLOZIN 10 MG/1
10 TABLET, FILM COATED ORAL EVERY MORNING
COMMUNITY
Start: 2024-10-08 | End: 2024-10-14

## 2024-10-14 NOTE — PROGRESS NOTES
Primary Care Telemedicine Note  The patient location is:  Patient Home   The chief complaint leading to consultation is: hospital follow up  Total time spent with patient: 20 min    Visit type: Virtual visit with synchronous audio and video  Each patient to whom he or she provides medical services by telemedicine is:  (1) informed of the relationship between the physician and patient and the respective role of any other health care provider with respect to management of the patient; and (2) notified that he or she may decline to receive medical services by telemedicine and may withdraw from such care at any time.    Assessment/Plan:    Problem List Items Addressed This Visit          Neuro    Primary parkinsonism      Hospital discharge follow-up    -  Primary    Dizziness        Metabolic acidosis        Relevant Orders    Comprehensive Metabolic Panel    MCI (mild cognitive impairment)       -dizziness symptoms since hospital stay with no other acute neurologist symptoms  -medications changes made during stay; possible side effect?  -reaching out to neurology for further recs  -follow up with specialist as scheduled  -repeat CMP being sent to home health for collection    Follow up if symptoms worsen or fail to improve.    Padmini Boucher MD  _____________________________________________________________________________________________________________________________________________________    CC: hospital discharge follow up    HPI:    Patient is in clinic today as an established patient. Patient recently hospitalized at [facility] for syncope/encephalopathy. D/c summary below:    Admit Date:   10/4/2024  Discharge Date:   10/8/2024    Hospital Course and Treatment:     83 yo WF who is not really aware of anything and cannot tell or remember what happened with h/o Parkinson's disease with walker/ambulation slow at baseline with both hand resting tremors, DM2, htn, diastolic CHF with CAD s/p PCI LAD and h/o STEMI  2015 postanterior on aspirin/eliquis 2.5, paroxysmal afibrillation controlled, IBS with urinary retention, admitted for unwitnessed fall and LOC. Workup revealed that her presentation was likely 2/2 progressive parkinson's disease or dementia. Patient and family opted for home with home health and she was medically stable for discharge on 10/8/24.    Hospital Course by Problem:    LOC with syncope and hallucinations  metabolic acidosis  -Noted co2 15 + urinary ketones high - multifactorial  ---->arrhythmia vs. Dehydration with met acidosis vs. Med-induced (on elavil 50 qhs + seroquel) vs. Parkinsonian deconditioning  -Head CT neg, trop neg, urinary ketones, BC drawn on 10/4 NGTD  -Continue telemetry  -Hold home elavil 50 qhs + seroquel 25 due to falls and concern for oversedation  -Continue Telemetry  -CE's trended and negative  -Cont  x 1L , reeval with repeat orthostats  -Echo with normal EF, indeterminate diastolic function  -Carotid ultrasound without any hemodynamic significant stenosis  -PT/OT eval for general eval on ADLs -recommending skilled nursing placement  -Swallow study planned Monday  -Okay for regular, puréed diet with nursing assistance  -No further syncope or hallucinations. Patient more alert ans awake. She is medically cleared for placement.    CAD s/p PCI LAD with h/o STEMI  Paroxysmal atrial fibrillation  Diastolic CHF - euvolemic  -cont aspirin/elaquis 2.5 daily  -cont lipitor  -cont farxiga  -per cardiology outpatient, amiodarone stopped d/t nausea/vomiting  -Echo updated this admission demonstrating normal LVEF, indeterminate diastolic function  -2D echo 4/2024: EF 50-55% LV dysfx low end of normal    HTN - cont metoprolol xl 100  DM2 - HOLD metformin  Dementia/Cognitive impairment - cont namenda 5 bid. TSH, folate WNL. B12 elevated. Hold B12 injection  Patient's dementia and cognitive impairment prevent her from functioning safely at home alone. Given the fact the patient lives alone  and is not able to care for herself, patient will need placement.  Urinary retention - cont home meds: flomax + mirapex    Patient's Condition On Discharge:   Stable     Since d/c:   History of Present Illness  The patient is an 84-year-old female presenting virtually for a hospital follow-up. She is accompanied by her daughter.    She reports feeling well today but has been experiencing persistent dizziness since her hospital discharge. This sensation, described as the world shaking around her, occurs when she moves and requires her to hold onto something until it subsides. She does not recall experiencing this before her hospital stay. The dizziness is constant and does not seem to be triggered or worsened by any specific actions. She does not experience faintness or loss of consciousness associated with the dizziness. Her medication regimen was altered during her hospital stay, with Seroquel being discontinued and replaced with a patch and Zyprexa at night. She continues to take Namenda, but amitriptyline was also discontinued. She is currently under the care of [facility] and has sitters in place. Physical therapy is scheduled to start soon. No blood work has been conducted since her hospital discharge.    She was prescribed Farxiga upon her hospital discharge, even though she had previous issues with yeast infections while on this medication. She continues to take metformin. She consulted with her cardiologist, Dr. Henning, prior to her hospital admission. She is not taking amiodarone due to nausea and vomiting but continues to take metoprolol and a lower dose of Eliquis.    No other new complaints today.         All discharge medications have been reviewed and reconciled on chart.     Current Outpatient Medications on File Prior to Visit   Medication Sig Dispense Refill    apixaban (ELIQUIS) 2.5 mg Tab Take 2.5 mg by mouth 2 (two) times daily.      aspirin 81 MG Chew Take 81 mg by mouth once daily.       blood sugar diagnostic (CONTOUR TEST STRIPS) Strp TEST BLOOD GLUCOSE ONCE DAILY 100 each 11    clotrimazole (LOTRIMIN) 1 % cream Apply topically 2 (two) times daily. 45 g 0    cyanocobalamin (VITAMIN B-12) 500 MCG tablet Take 500 mcg by mouth once daily.      diphenoxylate-atropine 2.5-0.025 mg (LOMOTIL) 2.5-0.025 mg per tablet Take 1 tablet by mouth 4 (four) times daily as needed for Diarrhea. 30 tablet 0    ferrous gluconate (FERGON) 324 MG tablet Take 324 mg by mouth daily with breakfast.      fluorometholone 0.1% (FML) 0.1 % DrpS Place 1 drop into both eyes 4 (four) times daily.      fluorouraciL (EFUDEX) 5 % cream Apply 1 application  topically 2 (two) times daily.      folic acid (FOLVITE) 1 MG tablet Take 1 mg by mouth once daily.      lancets (MICROLET LANCET) Misc TEST BLOOD GLUCOSE ONCE DAILY 100 each 3    memantine (NAMENDA) 10 MG Tab Take 10 mg by mouth 2 (two) times daily.      metFORMIN (GLUCOPHAGE) 500 MG tablet TAKE 1 TABLET BY MOUTH TWICE DAILY 180 tablet 0    metoprolol succinate (TOPROL-XL) 100 MG 24 hr tablet Take 50 mg by mouth once daily.       nystatin (MYCOSTATIN) powder Apply topically 4 (four) times daily. 60 g 0    OLANZapine (ZYPREXA) 5 MG tablet Take 5 mg by mouth every evening.      ondansetron (ZOFRAN) 4 MG tablet Take 1 tablet (4 mg total) by mouth every 8 (eight) hours as needed for Nausea. 30 tablet 0    rivastigmine (EXELON) 4.6 mg/24 hour PT24 Place 1 patch onto the skin once daily. 30 patch 11    senna-docusate 8.6-50 mg (SENNA WITH DOCUSATE SODIUM) 8.6-50 mg per tablet Take 2 tablets by mouth 2 (two) times a day.      tamsulosin (FLOMAX) 0.4 mg Cap Take 1 capsule by mouth once daily.       No current facility-administered medications on file prior to visit.       Review of Systems   Constitutional:  Positive for activity change and unexpected weight change.   HENT:  Positive for hearing loss and trouble swallowing. Negative for rhinorrhea.    Eyes:  Positive for visual  disturbance. Negative for discharge.   Respiratory:  Negative for chest tightness and wheezing.    Cardiovascular:  Negative for chest pain and palpitations.   Gastrointestinal:  Positive for diarrhea. Negative for blood in stool, constipation and vomiting.   Endocrine: Negative for polydipsia and polyuria.   Genitourinary:  Positive for difficulty urinating. Negative for dysuria, hematuria and menstrual problem.   Musculoskeletal:  Negative for arthralgias, joint swelling and neck pain.   Neurological:  Positive for dizziness and weakness. Negative for headaches.   Psychiatric/Behavioral:  Positive for confusion. Negative for dysphoric mood.        Vitals:    10/14/24 1326   Weight: 49 kg (108 lb)       Wt Readings from Last 3 Encounters:   10/14/24 49 kg (108 lb)   08/21/24 54.5 kg (120 lb 2.4 oz)   08/08/24 56.2 kg (124 lb)       Physical Exam  Constitutional:       General: She is not in acute distress.     Appearance: She is well-developed.   HENT:      Head: Normocephalic and atraumatic.   Pulmonary:      Effort: Pulmonary effort is normal. No respiratory distress.   Abdominal:      General: There is no distension.   Musculoskeletal:         General: Normal range of motion.      Cervical back: Normal range of motion.   Neurological:      General: No focal deficit present.      Mental Status: She is alert and oriented to person, place, and time. Mental status is at baseline.   Psychiatric:         Mood and Affect: Mood normal.         Behavior: Behavior normal.         Transitional Care Note    Family and/or Caretaker present at visit?  Yes.  Diagnostic tests reviewed/disposition: I have reviewed all completed as well as pending diagnostic tests at the time of discharge.  Disease/illness education: provided  Home health/community services discussion/referrals: Patient has home health established at [facility] .   Establishment or re-establishment of referral orders for community resources: No other  necessary community resources.   Discussion with other health care providers:  Plan to reach out to Neurology .     DISCLAIMER: This note was compiled by using a speech recognition dictation system and therefore please be aware that typographical / speech recognition errors can and do occur.  Please contact me if you see any errors specifically.  Consent was obtained for THAD recording system prior to the visit.

## 2024-10-14 NOTE — Clinical Note
Ronaldo Christy, I recently had a virtual hospital follow up with Ms. Chi. Several of her medications were changed during her hospital stay and she has been experiencing dizziness since discharge. Seroquel was discontinued and she was started on Zyprexa QHS and Exelon patch. She remains on Namenda. I was wondering if it could be a medication side effect. Upon looking it up, it appeared that the Zyprexa had more potential for dizziness as an AE than the Exelon but wanted to get your opinion. Thx! Padmini Boucher

## 2024-10-18 ENCOUNTER — PATIENT MESSAGE (OUTPATIENT)
Dept: FAMILY MEDICINE | Facility: CLINIC | Age: 84
End: 2024-10-18
Payer: MEDICARE

## 2024-10-20 PROCEDURE — G0179 MD RECERTIFICATION HHA PT: HCPCS | Mod: ,,, | Performed by: INTERNAL MEDICINE

## 2024-10-22 ENCOUNTER — DOCUMENT SCAN (OUTPATIENT)
Dept: HOME HEALTH SERVICES | Facility: HOSPITAL | Age: 84
End: 2024-10-22
Payer: MEDICARE

## 2024-10-25 ENCOUNTER — DOCUMENT SCAN (OUTPATIENT)
Dept: HOME HEALTH SERVICES | Facility: HOSPITAL | Age: 84
End: 2024-10-25
Payer: MEDICARE

## 2024-10-31 ENCOUNTER — EXTERNAL HOME HEALTH (OUTPATIENT)
Dept: HOME HEALTH SERVICES | Facility: HOSPITAL | Age: 84
End: 2024-10-31
Payer: MEDICARE

## 2024-11-01 ENCOUNTER — TELEPHONE (OUTPATIENT)
Dept: FAMILY MEDICINE | Facility: CLINIC | Age: 84
End: 2024-11-01
Payer: MEDICARE

## 2024-11-01 DIAGNOSIS — N30.00 ACUTE CYSTITIS WITHOUT HEMATURIA: Primary | ICD-10-CM

## 2024-11-01 RX ORDER — CEFDINIR 300 MG/1
300 CAPSULE ORAL 2 TIMES DAILY
Qty: 20 CAPSULE | Refills: 0 | Status: SHIPPED | OUTPATIENT
Start: 2024-11-01 | End: 2024-11-11

## 2024-11-07 ENCOUNTER — TELEPHONE (OUTPATIENT)
Dept: FAMILY MEDICINE | Facility: CLINIC | Age: 84
End: 2024-11-07
Payer: MEDICARE

## 2024-11-07 NOTE — TELEPHONE ENCOUNTER
Please let patient know that I did receive her recent urine culture results and that the antibiotic that Bell prescribed should work for the bacteria that grew from urine.

## 2024-11-11 ENCOUNTER — DOCUMENT SCAN (OUTPATIENT)
Dept: HOME HEALTH SERVICES | Facility: HOSPITAL | Age: 84
End: 2024-11-11
Payer: MEDICARE

## 2024-11-11 ENCOUNTER — OFFICE VISIT (OUTPATIENT)
Dept: FAMILY MEDICINE | Facility: CLINIC | Age: 84
End: 2024-11-11
Payer: MEDICARE

## 2024-11-11 ENCOUNTER — PATIENT MESSAGE (OUTPATIENT)
Dept: FAMILY MEDICINE | Facility: CLINIC | Age: 84
End: 2024-11-11

## 2024-11-11 VITALS
WEIGHT: 114 LBS | TEMPERATURE: 98 F | BODY MASS INDEX: 18.32 KG/M2 | SYSTOLIC BLOOD PRESSURE: 160 MMHG | HEART RATE: 60 BPM | DIASTOLIC BLOOD PRESSURE: 65 MMHG | HEIGHT: 66 IN

## 2024-11-11 DIAGNOSIS — E78.5 TYPE 2 DIABETES MELLITUS WITH HYPERLIPIDEMIA: ICD-10-CM

## 2024-11-11 DIAGNOSIS — F33.0 MILD EPISODE OF RECURRENT MAJOR DEPRESSIVE DISORDER: ICD-10-CM

## 2024-11-11 DIAGNOSIS — R42 DIZZINESS: ICD-10-CM

## 2024-11-11 DIAGNOSIS — E11.69 TYPE 2 DIABETES MELLITUS WITH HYPERLIPIDEMIA: ICD-10-CM

## 2024-11-11 DIAGNOSIS — F41.9 ANXIETY: ICD-10-CM

## 2024-11-11 DIAGNOSIS — I10 ESSENTIAL HYPERTENSION: Primary | ICD-10-CM

## 2024-11-11 PROCEDURE — 99999 PR PBB SHADOW E&M-EST. PATIENT-LVL V: CPT | Mod: PBBFAC,,, | Performed by: INTERNAL MEDICINE

## 2024-11-11 PROCEDURE — 99214 OFFICE O/P EST MOD 30 MIN: CPT | Mod: S$PBB,,, | Performed by: INTERNAL MEDICINE

## 2024-11-11 PROCEDURE — 99215 OFFICE O/P EST HI 40 MIN: CPT | Mod: PBBFAC,PO | Performed by: INTERNAL MEDICINE

## 2024-11-11 RX ORDER — METFORMIN HYDROCHLORIDE 500 MG/1
500 TABLET ORAL 2 TIMES DAILY
Qty: 180 TABLET | Refills: 3 | Status: SHIPPED | OUTPATIENT
Start: 2024-11-11

## 2024-11-11 RX ORDER — METOPROLOL SUCCINATE 100 MG/1
100 TABLET, EXTENDED RELEASE ORAL DAILY
Qty: 90 TABLET | Refills: 3 | Status: SHIPPED | OUTPATIENT
Start: 2024-11-11

## 2024-11-11 RX ORDER — FLUOXETINE HYDROCHLORIDE 20 MG/1
20 CAPSULE ORAL DAILY
Qty: 30 CAPSULE | Refills: 11 | Status: SHIPPED | OUTPATIENT
Start: 2024-11-11 | End: 2025-11-11

## 2024-11-11 RX ORDER — FUROSEMIDE 20 MG/1
20 TABLET ORAL
COMMUNITY
Start: 2024-11-03

## 2024-11-18 ENCOUNTER — OFFICE VISIT (OUTPATIENT)
Dept: OTOLARYNGOLOGY | Facility: CLINIC | Age: 84
End: 2024-11-18
Payer: MEDICARE

## 2024-11-18 VITALS — WEIGHT: 110.88 LBS | HEIGHT: 67 IN | BODY MASS INDEX: 17.4 KG/M2

## 2024-11-18 DIAGNOSIS — R42 DIZZINESS: ICD-10-CM

## 2024-11-18 DIAGNOSIS — G20.C PRIMARY PARKINSONISM: Primary | ICD-10-CM

## 2024-11-18 PROCEDURE — 99204 OFFICE O/P NEW MOD 45 MIN: CPT | Mod: S$PBB,,, | Performed by: OTOLARYNGOLOGY

## 2024-11-18 PROCEDURE — 99999 PR PBB SHADOW E&M-EST. PATIENT-LVL IV: CPT | Mod: PBBFAC,,, | Performed by: OTOLARYNGOLOGY

## 2024-11-18 PROCEDURE — 99214 OFFICE O/P EST MOD 30 MIN: CPT | Mod: PBBFAC | Performed by: OTOLARYNGOLOGY

## 2024-11-18 NOTE — PROGRESS NOTES
"Referring Provider:    Padmini Boucher Md  [address] [address]  [address]  [place] [address]  Subjective:   Patient: Alon Chatman [id number], :1940   Visit date:2024 2:08 PM    Chief Complaint:  Dizziness (Pt is coming in today for dizziness since fallen a month ago)    HPI:    Prior notes reviewed by myself.  Clinical documentation obtained by nursing staff reviewed.     83 y/o female here for evaluation of dizziness.  She had a fall roughly 1 month ago that she does not remember the details of.  She reports mostly unsteadiness and difficulty walking in a straight line without her walker.  She has occasional brief episodes of spinning dizziness but these are not the predominant type dizziness that she has.  She has not noticed any acute changes in her hearing or tinnitus.  She does have a history of Parkinson's disease and is on multiple different medications that have dizziness as a side effect.  She has had some elevated blood pressure readings as well.      Objective:     Physical Exam:  Vitals:  Ht 5' 7.2" (1.707 m)   Wt 50.3 kg (110 lb 14.3 oz)   BMI 17.26 kg/m²   General appearance:  Well developed, well nourished    Ears:  Otoscopy of external auditory canals and tympanic membranes was normal, clinical speech reception thresholds grossly intact, no mass/lesion of auricle.    Nose:  No masses/lesions of external nose, nasal mucosa, septum, and turbinates were within normal limits.    Mouth:  No mass/lesion of lips, teeth, gums, hard/soft palate, tongue, tonsils, or oropharynx.    Neck & Lymphatics:  No cervical lymphadenopathy, no neck mass/crepitus/ asymmetry, trachea is midline, no thyroid enlargement/tenderness/mass.    Neuro:  CN II-XII intact bilaterally        [x]  Data Reviewed:    Lab Results   Component Value Date    WBC 11.8 (H) 2024    HGB 10.1 (L) 2024    HCT 31.3 (L) 2024    MCV 93.2 2024    EOSINOPHIL 4.2 2023       INDEPENDENT REVIEW:  no " intracranial process, hematoma on parietal scalp    CT Head Without Contrast  Order: [id number]  Narrative    EXAM: CT HEAD WO CONTRAST    CLINICAL HISTORY: Trauma.  Head injury.    COMPARISON: Head CT report dated 04/04/2024    TECHNIQUE: The head was scanned without intravenous contrast.    FINDINGS: No midline shift or hydrocephalus.  No skull fracture.  The paranasal sinuses and mastoid air cells are clear.  There is no intrarenal hemorrhage or extra-axial fluid collection.  No mass lesion or cerebral edema.  Age-related involutional  changes of the brain.    There is a midline high parietal scalp hematoma.      IMPRESSION:  1.  Midline high parietal scalp hematoma without underlying skull fracture.  2.  No acute intracranial process.    All CT scans at [this location] are performed using dose modulation techniques as appropriate to a performed exam including the following:  Automated exposure control; adjustment of the mA and/or kV according to patient size (this includes techniques or  standardized protocols for targeted exams where dose is matched to indication / reason for exam; i.e. extremities or head); use of iterative reconstruction technique.    Finalized on: 10/4/2024 9:32 PM By:  Riaz Barber MD  [facility]# [id number]      2024-10-04 21:34:07.853    [facility]  Exam End: 10/04/24 21:21    Specimen Collected: 10/04/24 21:30 Last Resulted: 10/04/24 21:32   Received From: [facility]  Result Received: 10/09/24 07:48       Assessment & Plan:   Primary parkinsonism    Dizziness  -     Ambulatory referral/consult to ENT  -     Ambulatory referral/consult to Physical/Occupational Therapy; Future; Expected date: 11/25/2024        We had a long discussion about her recent fall and her symptoms.  I suspect that her dizziness/disequilibrium is multifactorial.  Major contributing factors are her age, blood pressure, polypharmacy, diabetes, Parkinson's disease.  I think that a vestibular contribution is less likely.   She does have some occasional spinning dizziness, so I think a vestibular eval of some sort is warranted.  We discussed the benefits of balance therapy or at least a balance evaluation by our physical therapy department.  She and her daughter agreed to move forward with this.  We will follow-up.    Seven Gold M.D.  Department of Otolaryngology - Head & Neck Surgery  [address]  [place] [address]  P: [phone]  F: [phone]        DISCLAIMER: This note was prepared with [employer] voice recognition transcription software. Garbled syntax, mangled pronouns, and other bizarre constructions may be attributed to that software system. While efforts were made to correct any mistakes made by this voice recognition program, some errors and/or omissions may remain in the note that were missed when the note was originally created.

## 2024-11-19 ENCOUNTER — DOCUMENT SCAN (OUTPATIENT)
Dept: HOME HEALTH SERVICES | Facility: HOSPITAL | Age: 84
End: 2024-11-19
Payer: MEDICARE

## 2024-11-21 ENCOUNTER — PATIENT MESSAGE (OUTPATIENT)
Dept: NEUROLOGY | Facility: CLINIC | Age: 84
End: 2024-11-21
Payer: MEDICARE

## 2024-11-27 ENCOUNTER — DOCUMENT SCAN (OUTPATIENT)
Dept: HOME HEALTH SERVICES | Facility: HOSPITAL | Age: 84
End: 2024-11-27
Payer: MEDICARE

## 2024-12-02 ENCOUNTER — TELEPHONE (OUTPATIENT)
Dept: FAMILY MEDICINE | Facility: CLINIC | Age: 84
End: 2024-12-02
Payer: MEDICARE

## 2024-12-02 DIAGNOSIS — R05.9 COUGH, UNSPECIFIED TYPE: Primary | ICD-10-CM

## 2024-12-02 NOTE — TELEPHONE ENCOUNTER
----- Message from Mónica sent at 12/2/2024  9:14 AM CST -----  Contact: Blanquita with [facility] phone [phone] fax [phone]  Calling to request an order for a portable chest xray, for cough and crackling in her chest, to be done in home. Also needs medication to help the patient sleep. Please advise. Thanks.

## 2024-12-02 NOTE — TELEPHONE ENCOUNTER
I have signed for the following orders AND/OR meds.  Please call the patient and ask the patient to schedule the testing AND/OR inform about any medications that were sent. Medications have been sent to pharmacy listed below      Orders Placed This Encounter   Procedures    HME - OTHER     Order Specific Question:   Type of Equipment:     Answer:   portable chest XR     Order Specific Question:   Height:     Answer:   5'7     Order Specific Question:   Weight:     Answer:   110 lbs              [id number] - [place] - [address] [address]  Phone: [phone] Fax: [phone]    [id number] - [place] - [address]  [address] [place] [address]  Phone: [phone] Fax: [phone]

## 2024-12-02 NOTE — TELEPHONE ENCOUNTER
Spoke with Denis, informed orders faxed. She also asked if something to help with sleep could be sent in as well.

## 2024-12-05 ENCOUNTER — OFFICE VISIT (OUTPATIENT)
Dept: FAMILY MEDICINE | Facility: CLINIC | Age: 84
End: 2024-12-05
Payer: MEDICARE

## 2024-12-05 DIAGNOSIS — J20.8 ACUTE BACTERIAL BRONCHITIS: Primary | ICD-10-CM

## 2024-12-05 DIAGNOSIS — B96.89 ACUTE BACTERIAL BRONCHITIS: Primary | ICD-10-CM

## 2024-12-05 PROCEDURE — 99213 OFFICE O/P EST LOW 20 MIN: CPT | Mod: 95,,, | Performed by: INTERNAL MEDICINE

## 2024-12-05 RX ORDER — PREDNISONE 20 MG/1
20 TABLET ORAL DAILY
Qty: 5 TABLET | Refills: 0 | Status: SHIPPED | OUTPATIENT
Start: 2024-12-05 | End: 2024-12-10

## 2024-12-05 RX ORDER — ALBUTEROL SULFATE 90 UG/1
2 INHALANT RESPIRATORY (INHALATION) EVERY 6 HOURS PRN
Qty: 18 G | Refills: 0 | Status: SHIPPED | OUTPATIENT
Start: 2024-12-05

## 2024-12-05 RX ORDER — AMOXICILLIN AND CLAVULANATE POTASSIUM 875; 125 MG/1; MG/1
1 TABLET, FILM COATED ORAL 2 TIMES DAILY
Qty: 14 TABLET | Refills: 0 | Status: ON HOLD | OUTPATIENT
Start: 2024-12-05 | End: 2024-12-16 | Stop reason: HOSPADM

## 2024-12-05 RX ORDER — BENZONATATE 100 MG/1
100 CAPSULE ORAL 3 TIMES DAILY PRN
Qty: 30 CAPSULE | Refills: 0 | Status: SHIPPED | OUTPATIENT
Start: 2024-12-05 | End: 2024-12-12

## 2024-12-05 NOTE — PROGRESS NOTES
Assessment & Plan  1. Dizziness.  The patient reports persistent dizziness, which worsens with movement and lasts for minutes. She has been off Zyprexa (olanzapine) for over a week but without improvement of symptoms. A referral to an ENT specialist was made to rule out inner ear issues. If the ENT specialist determines that the issue is not ear-related, a referral to neurology will be considered. Physical therapy and occupational therapy for vestibular and balance therapy were ordered.    2. Anxiety/depression.   Recent worsening anxiety symptoms. Chronic history but no longer on any daily medications. Denies SI/HI. Discussed starting on prozac. Discussed anxiety condition course. Discussed risk of discontinuing this medication without tapering once she is on established dose. She was educated, advised of side effects, and all questions were answered.  Patient voiced understanding. She will follow up routinely and notify us if having any side effects or worsening or persistent symptoms.  ER precautions were given.       Problem List Items Addressed This Visit          Psychiatric    Anxiety    Cardiac/Vascular    Essential hypertension - Primary    Relevant Medications    metoprolol succinate (TOPROL-XL) 100 MG 24 hr tablet    Endocrine    Type 2 diabetes mellitus with hyperlipidemia    Relevant Medications    metFORMIN (GLUCOPHAGE) 500 MG tablet    Other    Dizziness    Relevant Orders    Ambulatory referral/consult to ENT    SUBSEQUENT HOME HEALTH ORDERS      Mild episode of recurrent major depressive disorder        Relevant Medications    FLUoxetine 20 MG capsule                Padmini Boucher MD  _____________________________________________________________________________________________________________________________________________________    CC: follow up of chronic medical conditions     Patient is in clinic today as an established patient.    History of Present Illness  The patient is an 84-year-old  female here for a routine follow-up. She is accompanied by her daughter.    She continues to experience dizziness, which is less severe than before. The dizziness is not constant but is triggered by certain movements such as turning over in bed or transitioning from sitting to standing. These episodes last for a few minutes and do not completely subside. She has been struggling with her balance, walking slower, and taking smaller steps. She has been off Zyprexa for over a week and has been staying with her daughter due to fear of being alone. She was previously recommended for skilled nursing care during her stay at [facility] but was not admitted dur to insurance denial. She has been under home health care.     She has also been experiencing intermittent stomach upset, anxiety, and depression. Her stomach issues have been ongoing for 20 years, with occasional nausea but no bowel problems. She has been using senna as needed and does not take any probiotics.    She has difficulty walking without a walker and is currently undergoing physical therapy at home. She has requested a refill of her metoprolol prescription. Her blood pressure is monitored by home health care.     No other new complaints today.  Remaining chronic conditions have been reviewed and remain stable.     Current Outpatient Medications on File Prior to Visit   Medication Sig Dispense Refill    apixaban (ELIQUIS) 2.5 mg Tab Take 2.5 mg by mouth 2 (two) times daily.      aspirin 81 MG Chew Take 81 mg by mouth once daily.      blood sugar diagnostic (CONTOUR TEST STRIPS) Strp TEST BLOOD GLUCOSE ONCE DAILY 100 each 11    cyanocobalamin (VITAMIN B-12) 500 MCG tablet Take 500 mcg by mouth once daily.      ferrous gluconate (FERGON) 324 MG tablet Take 324 mg by mouth daily with breakfast.      folic acid (FOLVITE) 1 MG tablet Take 1 mg by mouth once daily.      furosemide (LASIX) 20 MG tablet Take 20 mg by mouth as needed.      lancets (MICROLET LANCET)  "Misc TEST BLOOD GLUCOSE ONCE DAILY 100 each 3    memantine (NAMENDA) 10 MG Tab Take 10 mg by mouth 2 (two) times daily.      senna-docusate 8.6-50 mg (SENNA WITH DOCUSATE SODIUM) 8.6-50 mg per tablet Take 2 tablets by mouth 2 (two) times a day.      tamsulosin (FLOMAX) 0.4 mg Cap Take 1 capsule by mouth once daily.       No current facility-administered medications on file prior to visit.       Review of Systems   Constitutional:  Negative for chills, diaphoresis, fatigue and fever.   HENT:  Negative for congestion, ear pain, postnasal drip, sinus pain and sore throat.    Eyes:  Negative for pain and redness.   Respiratory:  Negative for cough, chest tightness and shortness of breath.    Cardiovascular:  Negative for chest pain and leg swelling.   Gastrointestinal:  Negative for abdominal pain, constipation, diarrhea, nausea and vomiting.   Genitourinary:  Negative for dysuria and hematuria.   Musculoskeletal:  Negative for arthralgias and joint swelling.   Skin:  Negative for rash.   Neurological:  Positive for dizziness. Negative for syncope and headaches.   Psychiatric/Behavioral:  Positive for dysphoric mood. Negative for self-injury and suicidal ideas. The patient is nervous/anxious.      Vitals:    11/11/24 0742 11/11/24 0826   BP: (!) 170/65 (!) 160/65   BP Location: Right arm    Patient Position: Sitting    Pulse: 61 60   Temp: 97.9 °F (36.6 °C)    Weight: 51.7 kg (114 lb)    Height: 5' 6" (1.676 m)        Wt Readings from Last 3 Encounters:   12/15/24 47.9 kg (105 lb 9.6 oz)   11/18/24 50.3 kg (110 lb 14.3 oz)   11/11/24 51.7 kg (114 lb)       Physical Exam  Constitutional:       General: She is not in acute distress.     Appearance: Normal appearance. She is well-developed.   HENT:      Head: Normocephalic and atraumatic.   Eyes:      Conjunctiva/sclera: Conjunctivae normal.   Cardiovascular:      Rate and Rhythm: Normal rate and regular rhythm.      Pulses: Normal pulses.      Heart sounds: Normal heart " sounds. No murmur heard.  Pulmonary:      Effort: Pulmonary effort is normal. No respiratory distress.      Breath sounds: Normal breath sounds.   Abdominal:      General: Bowel sounds are normal. There is no distension.      Palpations: Abdomen is soft.      Tenderness: There is no abdominal tenderness.   Musculoskeletal:         General: Normal range of motion.      Cervical back: Normal range of motion and neck supple.   Skin:     General: Skin is warm and dry.      Findings: No rash.   Neurological:      Mental Status: She is alert and oriented to person, place, and time. Mental status is at baseline.   Psychiatric:         Mood and Affect: Mood normal.         Behavior: Behavior normal.     DISCLAIMER: This note was compiled by using a speech recognition dictation system and therefore please be aware that typographical / speech recognition errors can and do occur.  Please contact me if you see any errors specifically.  Consent was obtained for THAD recording system prior to the visit.

## 2024-12-06 ENCOUNTER — PATIENT MESSAGE (OUTPATIENT)
Dept: FAMILY MEDICINE | Facility: CLINIC | Age: 84
End: 2024-12-06
Payer: MEDICARE

## 2024-12-06 DIAGNOSIS — G47.00 INSOMNIA, UNSPECIFIED TYPE: Primary | ICD-10-CM

## 2024-12-11 RX ORDER — TRAZODONE HYDROCHLORIDE 50 MG/1
50 TABLET ORAL NIGHTLY
Qty: 30 TABLET | Refills: 0 | Status: SHIPPED | OUTPATIENT
Start: 2024-12-11 | End: 2024-12-12

## 2024-12-11 NOTE — TELEPHONE ENCOUNTER
Recommend trial of Trazodone to help with sleep.    I have signed for the following orders AND/OR meds.  Please call the patient and ask the patient to schedule the testing AND/OR inform about any medications that were sent. Medications have been sent to pharmacy listed below      No orders of the defined types were placed in this encounter.      Medications Ordered This Encounter   Medications    traZODone (DESYREL) 50 MG tablet     Sig: Take 1 tablet (50 mg total) by mouth every evening.     Dispense:  30 tablet     Refill:  0         [id number]  [place] - [address] [address]  Phone: [phone] Fax: [phone]    [id number] - [place] - [address]   [address] [place] [address]  Phone: [phone] Fax: [phone]

## 2024-12-12 ENCOUNTER — HOSPITAL ENCOUNTER (INPATIENT)
Facility: HOSPITAL | Age: 84
LOS: 2 days | Discharge: NURSING, REHAB OR LONG-TERM CARE | DRG: 194 | End: 2024-12-16
Attending: EMERGENCY MEDICINE | Admitting: STUDENT IN AN ORGANIZED HEALTH CARE EDUCATION/TRAINING PROGRAM
Payer: MEDICARE

## 2024-12-12 DIAGNOSIS — R53.81 DEBILITY: ICD-10-CM

## 2024-12-12 DIAGNOSIS — R55 POSTURAL DIZZINESS WITH PRESYNCOPE: ICD-10-CM

## 2024-12-12 DIAGNOSIS — R06.00 DYSPNEA: ICD-10-CM

## 2024-12-12 DIAGNOSIS — R42 DIZZINESS: ICD-10-CM

## 2024-12-12 DIAGNOSIS — Z78.9 FAILURE OF OUTPATIENT TREATMENT: ICD-10-CM

## 2024-12-12 DIAGNOSIS — R42 POSTURAL DIZZINESS WITH PRESYNCOPE: ICD-10-CM

## 2024-12-12 DIAGNOSIS — J18.9 PNEUMONIA OF LEFT LOWER LOBE DUE TO INFECTIOUS ORGANISM: Primary | ICD-10-CM

## 2024-12-12 DIAGNOSIS — R07.9 CHEST PAIN: ICD-10-CM

## 2024-12-12 DIAGNOSIS — J18.9 COMMUNITY ACQUIRED PNEUMONIA: ICD-10-CM

## 2024-12-12 LAB
ALBUMIN SERPL BCP-MCNC: 3.3 G/DL (ref 3.5–5.2)
ALP SERPL-CCNC: 60 U/L (ref 40–150)
ALT SERPL W/O P-5'-P-CCNC: 14 U/L (ref 10–44)
ANION GAP SERPL CALC-SCNC: 12 MMOL/L (ref 8–16)
AORTIC ROOT ANNULUS: 2.75 CM
ASCENDING AORTA: 2.27 CM
AST SERPL-CCNC: 14 U/L (ref 10–40)
AV INDEX (PROSTH): 0.74
AV MEAN GRADIENT: 4 MMHG
AV PEAK GRADIENT: 5.8 MMHG
AV REGURGITATION PRESSURE HALF TIME: 946.45 MS
AV VALVE AREA BY VELOCITY RATIO: 2.1 CM²
AV VALVE AREA: 2.1 CM²
AV VELOCITY RATIO: 0.75
BASOPHILS # BLD AUTO: 0.02 K/UL (ref 0–0.2)
BASOPHILS NFR BLD: 0.2 % (ref 0–1.9)
BILIRUB SERPL-MCNC: 0.5 MG/DL (ref 0.1–1)
BNP SERPL-MCNC: 147 PG/ML (ref 0–99)
BSA FOR ECHO PROCEDURE: 1.5 M2
BUN SERPL-MCNC: 23 MG/DL (ref 8–23)
CALCIUM SERPL-MCNC: 9.8 MG/DL (ref 8.7–10.5)
CHLORIDE SERPL-SCNC: 99 MMOL/L (ref 95–110)
CO2 SERPL-SCNC: 26 MMOL/L (ref 23–29)
CREAT SERPL-MCNC: 1 MG/DL (ref 0.5–1.4)
CV ECHO LV RWT: 0.54 CM
DIFFERENTIAL METHOD BLD: ABNORMAL
DOP CALC AO PEAK VEL: 1.2 M/S
DOP CALC AO VTI: 29.9 CM
DOP CALC LVOT AREA: 2.8 CM2
DOP CALC LVOT DIAMETER: 1.9 CM
DOP CALC LVOT PEAK VEL: 0.9 M/S
DOP CALC LVOT STROKE VOLUME: 62.6 CM3
DOP CALC RVOT PEAK VEL: 0.88 M/S
DOP CALC RVOT VTI: 21.3 CM
DOP CALCLVOT PEAK VEL VTI: 22.1 CM
E WAVE DECELERATION TIME: 225.39 MSEC
E/A RATIO: 0.97
E/E' RATIO: 11.8 M/S
ECHO LV POSTERIOR WALL: 1.1 CM (ref 0.6–1.1)
EOSINOPHIL # BLD AUTO: 0.1 K/UL (ref 0–0.5)
EOSINOPHIL NFR BLD: 0.5 % (ref 0–8)
ERYTHROCYTE [DISTWIDTH] IN BLOOD BY AUTOMATED COUNT: 12.3 % (ref 11.5–14.5)
EST. GFR  (NO RACE VARIABLE): 56 ML/MIN/1.73 M^2
ESTIMATED AVG GLUCOSE: 123 MG/DL (ref 68–131)
FRACTIONAL SHORTENING: 26.8 % (ref 28–44)
GLUCOSE SERPL-MCNC: 123 MG/DL (ref 70–110)
HBA1C MFR BLD: 5.9 % (ref 4–5.6)
HCT VFR BLD AUTO: 47 % (ref 37–48.5)
HGB BLD-MCNC: 15.6 G/DL (ref 12–16)
HIV 1+2 AB+HIV1 P24 AG SERPL QL IA: NEGATIVE
IMM GRANULOCYTES # BLD AUTO: 0.06 K/UL (ref 0–0.04)
IMM GRANULOCYTES NFR BLD AUTO: 0.5 % (ref 0–0.5)
INFLUENZA A, MOLECULAR: NEGATIVE
INFLUENZA B, MOLECULAR: NEGATIVE
INTERVENTRICULAR SEPTUM: 1 CM (ref 0.6–1.1)
IVC DIAMETER: 0.57 CM
IVRT: 106.57 MSEC
LA MAJOR: 3.67 CM
LA MINOR: 3.51 CM
LA WIDTH: 2.7 CM
LEFT ATRIUM SIZE: 2.43 CM
LEFT ATRIUM VOLUME INDEX: 13 ML/M2
LEFT ATRIUM VOLUME: 20.01 CM3
LEFT INTERNAL DIMENSION IN SYSTOLE: 3 CM (ref 2.1–4)
LEFT VENTRICLE DIASTOLIC VOLUME INDEX: 49.26 ML/M2
LEFT VENTRICLE DIASTOLIC VOLUME: 75.86 ML
LEFT VENTRICLE MASS INDEX: 91.9 G/M2
LEFT VENTRICLE SYSTOLIC VOLUME INDEX: 21.8 ML/M2
LEFT VENTRICLE SYSTOLIC VOLUME: 33.5 ML
LEFT VENTRICULAR INTERNAL DIMENSION IN DIASTOLE: 4.1 CM (ref 3.5–6)
LEFT VENTRICULAR MASS: 141.5 G
LV LATERAL E/E' RATIO: 11.8 M/S
LV SEPTAL E/E' RATIO: 11.8 M/S
LVED V (TEICH): 75.86 ML
LVES V (TEICH): 33.5 ML
LVOT MG: 1.91 MMHG
LVOT MV: 0.65 CM/S
LYMPHOCYTES # BLD AUTO: 1.4 K/UL (ref 1–4.8)
LYMPHOCYTES NFR BLD: 11.8 % (ref 18–48)
MCH RBC QN AUTO: 30.2 PG (ref 27–31)
MCHC RBC AUTO-ENTMCNC: 33.2 G/DL (ref 32–36)
MCV RBC AUTO: 91 FL (ref 82–98)
MONOCYTES # BLD AUTO: 1.1 K/UL (ref 0.3–1)
MONOCYTES NFR BLD: 9.5 % (ref 4–15)
MV PEAK A VEL: 0.61 M/S
MV PEAK E VEL: 0.59 M/S
MV STENOSIS PRESSURE HALF TIME: 65.36 MS
MV VALVE AREA P 1/2 METHOD: 3.37 CM2
NEUTROPHILS # BLD AUTO: 9 K/UL (ref 1.8–7.7)
NEUTROPHILS NFR BLD: 77.5 % (ref 38–73)
NRBC BLD-RTO: 0 /100 WBC
PISA AR MAX VEL: 4.26 M/S
PISA TR MAX VEL: 2.89 M/S
PLATELET # BLD AUTO: 176 K/UL (ref 150–450)
PMV BLD AUTO: 10.9 FL (ref 9.2–12.9)
POCT GLUCOSE: 127 MG/DL (ref 70–110)
POCT GLUCOSE: 186 MG/DL (ref 70–110)
POTASSIUM SERPL-SCNC: 4.2 MMOL/L (ref 3.5–5.1)
PROT SERPL-MCNC: 7.1 G/DL (ref 6–8.4)
PV MEAN GRADIENT: 2 MMHG
RA MAJOR: 2.97 CM
RA PRESSURE ESTIMATED: 3 MMHG
RA WIDTH: 2 CM
RBC # BLD AUTO: 5.17 M/UL (ref 4–5.4)
RV TB RVSP: 6 MMHG
SARS-COV-2 RDRP RESP QL NAA+PROBE: NEGATIVE
SODIUM SERPL-SCNC: 137 MMOL/L (ref 136–145)
SPECIMEN SOURCE: NORMAL
STJ: 2.37 CM
TDI LATERAL: 0.05 M/S
TDI SEPTAL: 0.05 M/S
TDI: 0.05 M/S
TR MAX PG: 33 MMHG
TRICUSPID ANNULAR PLANE SYSTOLIC EXCURSION: 1.73 CM
TROPONIN I SERPL DL<=0.01 NG/ML-MCNC: <0.006 NG/ML (ref 0–0.03)
TV REST PULMONARY ARTERY PRESSURE: 36 MMHG
WBC # BLD AUTO: 11.63 K/UL (ref 3.9–12.7)
Z-SCORE OF LEFT VENTRICULAR DIMENSION IN END DIASTOLE: -0.88
Z-SCORE OF LEFT VENTRICULAR DIMENSION IN END SYSTOLE: 0.61

## 2024-12-12 PROCEDURE — 84484 ASSAY OF TROPONIN QUANT: CPT | Performed by: EMERGENCY MEDICINE

## 2024-12-12 PROCEDURE — G0378 HOSPITAL OBSERVATION PER HR: HCPCS

## 2024-12-12 PROCEDURE — 99285 EMERGENCY DEPT VISIT HI MDM: CPT | Mod: 25

## 2024-12-12 PROCEDURE — 96365 THER/PROPH/DIAG IV INF INIT: CPT

## 2024-12-12 PROCEDURE — 80053 COMPREHEN METABOLIC PANEL: CPT | Performed by: EMERGENCY MEDICINE

## 2024-12-12 PROCEDURE — 25000242 PHARM REV CODE 250 ALT 637 W/ HCPCS: Performed by: EMERGENCY MEDICINE

## 2024-12-12 PROCEDURE — 25000003 PHARM REV CODE 250: Performed by: NURSE PRACTITIONER

## 2024-12-12 PROCEDURE — 97530 THERAPEUTIC ACTIVITIES: CPT

## 2024-12-12 PROCEDURE — 94640 AIRWAY INHALATION TREATMENT: CPT | Mod: XB

## 2024-12-12 PROCEDURE — 63600175 PHARM REV CODE 636 W HCPCS: Performed by: NURSE PRACTITIONER

## 2024-12-12 PROCEDURE — 97166 OT EVAL MOD COMPLEX 45 MIN: CPT

## 2024-12-12 PROCEDURE — 83880 ASSAY OF NATRIURETIC PEPTIDE: CPT | Performed by: EMERGENCY MEDICINE

## 2024-12-12 PROCEDURE — 87502 INFLUENZA DNA AMP PROBE: CPT | Performed by: EMERGENCY MEDICINE

## 2024-12-12 PROCEDURE — 85025 COMPLETE CBC W/AUTO DIFF WBC: CPT | Performed by: EMERGENCY MEDICINE

## 2024-12-12 PROCEDURE — 63600175 PHARM REV CODE 636 W HCPCS: Performed by: EMERGENCY MEDICINE

## 2024-12-12 PROCEDURE — 36415 COLL VENOUS BLD VENIPUNCTURE: CPT | Performed by: NURSE PRACTITIONER

## 2024-12-12 PROCEDURE — 87635 SARS-COV-2 COVID-19 AMP PRB: CPT | Performed by: EMERGENCY MEDICINE

## 2024-12-12 PROCEDURE — 87040 BLOOD CULTURE FOR BACTERIA: CPT | Performed by: EMERGENCY MEDICINE

## 2024-12-12 PROCEDURE — 96375 TX/PRO/DX INJ NEW DRUG ADDON: CPT

## 2024-12-12 PROCEDURE — 82962 GLUCOSE BLOOD TEST: CPT

## 2024-12-12 PROCEDURE — 93010 ELECTROCARDIOGRAM REPORT: CPT | Mod: ,,, | Performed by: INTERNAL MEDICINE

## 2024-12-12 PROCEDURE — 94761 N-INVAS EAR/PLS OXIMETRY MLT: CPT

## 2024-12-12 PROCEDURE — 87389 HIV-1 AG W/HIV-1&-2 AB AG IA: CPT | Performed by: EMERGENCY MEDICINE

## 2024-12-12 PROCEDURE — 97162 PT EVAL MOD COMPLEX 30 MIN: CPT

## 2024-12-12 PROCEDURE — 83036 HEMOGLOBIN GLYCOSYLATED A1C: CPT | Performed by: NURSE PRACTITIONER

## 2024-12-12 RX ORDER — FLUOXETINE HYDROCHLORIDE 20 MG/1
20 CAPSULE ORAL DAILY
Status: DISCONTINUED | OUTPATIENT
Start: 2024-12-12 | End: 2024-12-16 | Stop reason: HOSPADM

## 2024-12-12 RX ORDER — RIVASTIGMINE 4.6 MG/24H
1 PATCH, EXTENDED RELEASE TRANSDERMAL DAILY
COMMUNITY

## 2024-12-12 RX ORDER — SODIUM CHLORIDE 0.9 % (FLUSH) 0.9 %
10 SYRINGE (ML) INJECTION
Status: DISCONTINUED | OUTPATIENT
Start: 2024-12-12 | End: 2024-12-16 | Stop reason: HOSPADM

## 2024-12-12 RX ORDER — ONDANSETRON HYDROCHLORIDE 2 MG/ML
4 INJECTION, SOLUTION INTRAVENOUS EVERY 8 HOURS PRN
Status: DISCONTINUED | OUTPATIENT
Start: 2024-12-12 | End: 2024-12-16 | Stop reason: HOSPADM

## 2024-12-12 RX ORDER — IPRATROPIUM BROMIDE AND ALBUTEROL SULFATE 2.5; .5 MG/3ML; MG/3ML
3 SOLUTION RESPIRATORY (INHALATION)
Status: DISCONTINUED | OUTPATIENT
Start: 2024-12-13 | End: 2024-12-16 | Stop reason: HOSPADM

## 2024-12-12 RX ORDER — FOLIC ACID 1 MG/1
1 TABLET ORAL DAILY
Status: DISCONTINUED | OUTPATIENT
Start: 2024-12-12 | End: 2024-12-16 | Stop reason: HOSPADM

## 2024-12-12 RX ORDER — IPRATROPIUM BROMIDE AND ALBUTEROL SULFATE 2.5; .5 MG/3ML; MG/3ML
3 SOLUTION RESPIRATORY (INHALATION)
Status: COMPLETED | OUTPATIENT
Start: 2024-12-12 | End: 2024-12-12

## 2024-12-12 RX ORDER — MEMANTINE HYDROCHLORIDE 10 MG/1
10 TABLET ORAL 2 TIMES DAILY
Status: DISCONTINUED | OUTPATIENT
Start: 2024-12-12 | End: 2024-12-16 | Stop reason: HOSPADM

## 2024-12-12 RX ORDER — IBUPROFEN 200 MG
24 TABLET ORAL
Status: DISCONTINUED | OUTPATIENT
Start: 2024-12-12 | End: 2024-12-16 | Stop reason: HOSPADM

## 2024-12-12 RX ORDER — INSULIN ASPART 100 [IU]/ML
0-5 INJECTION, SOLUTION INTRAVENOUS; SUBCUTANEOUS
Status: DISCONTINUED | OUTPATIENT
Start: 2024-12-12 | End: 2024-12-16 | Stop reason: HOSPADM

## 2024-12-12 RX ORDER — POLYETHYLENE GLYCOL 3350 17 G/17G
17 POWDER, FOR SOLUTION ORAL 2 TIMES DAILY
COMMUNITY

## 2024-12-12 RX ORDER — OXYCODONE AND ACETAMINOPHEN 2.5; 325 MG/1; MG/1
0.5 TABLET ORAL EVERY 4 HOURS PRN
COMMUNITY

## 2024-12-12 RX ORDER — ACETAMINOPHEN 325 MG/1
650 TABLET ORAL EVERY 8 HOURS PRN
Status: DISCONTINUED | OUTPATIENT
Start: 2024-12-12 | End: 2024-12-16 | Stop reason: HOSPADM

## 2024-12-12 RX ORDER — CEFTRIAXONE 1 G/1
1 INJECTION, POWDER, FOR SOLUTION INTRAMUSCULAR; INTRAVENOUS
Status: DISCONTINUED | OUTPATIENT
Start: 2024-12-13 | End: 2024-12-16 | Stop reason: HOSPADM

## 2024-12-12 RX ORDER — IPRATROPIUM BROMIDE AND ALBUTEROL SULFATE 2.5; .5 MG/3ML; MG/3ML
3 SOLUTION RESPIRATORY (INHALATION) EVERY 8 HOURS
Status: DISCONTINUED | OUTPATIENT
Start: 2024-12-12 | End: 2024-12-12

## 2024-12-12 RX ORDER — PREDNISONE 20 MG/1
20 TABLET ORAL DAILY
COMMUNITY

## 2024-12-12 RX ORDER — IBUPROFEN 200 MG
16 TABLET ORAL
Status: DISCONTINUED | OUTPATIENT
Start: 2024-12-12 | End: 2024-12-16 | Stop reason: HOSPADM

## 2024-12-12 RX ORDER — GLUCAGON 1 MG
1 KIT INJECTION
Status: DISCONTINUED | OUTPATIENT
Start: 2024-12-12 | End: 2024-12-16 | Stop reason: HOSPADM

## 2024-12-12 RX ORDER — HYDRALAZINE HYDROCHLORIDE 20 MG/ML
10 INJECTION INTRAMUSCULAR; INTRAVENOUS EVERY 6 HOURS PRN
Status: DISCONTINUED | OUTPATIENT
Start: 2024-12-12 | End: 2024-12-16 | Stop reason: HOSPADM

## 2024-12-12 RX ORDER — METHOCARBAMOL 500 MG/1
500 TABLET, FILM COATED ORAL 4 TIMES DAILY PRN
COMMUNITY

## 2024-12-12 RX ORDER — CEFTRIAXONE 1 G/1
1 INJECTION, POWDER, FOR SOLUTION INTRAMUSCULAR; INTRAVENOUS
Status: COMPLETED | OUTPATIENT
Start: 2024-12-12 | End: 2024-12-12

## 2024-12-12 RX ORDER — METOPROLOL SUCCINATE 50 MG/1
100 TABLET, EXTENDED RELEASE ORAL DAILY
Status: DISCONTINUED | OUTPATIENT
Start: 2024-12-12 | End: 2024-12-13

## 2024-12-12 RX ADMIN — IPRATROPIUM BROMIDE AND ALBUTEROL SULFATE 3 ML: 2.5; .5 SOLUTION RESPIRATORY (INHALATION) at 07:12

## 2024-12-12 RX ADMIN — CEFTRIAXONE 1 G: 1 INJECTION, POWDER, FOR SOLUTION INTRAMUSCULAR; INTRAVENOUS at 08:12

## 2024-12-12 RX ADMIN — FLUOXETINE HYDROCHLORIDE 20 MG: 20 CAPSULE ORAL at 10:12

## 2024-12-12 RX ADMIN — ACETAMINOPHEN 650 MG: 325 TABLET ORAL at 10:12

## 2024-12-12 RX ADMIN — AZITHROMYCIN MONOHYDRATE 500 MG: 500 INJECTION, POWDER, LYOPHILIZED, FOR SOLUTION INTRAVENOUS at 09:12

## 2024-12-12 RX ADMIN — MEMANTINE 10 MG: 10 TABLET ORAL at 08:12

## 2024-12-12 RX ADMIN — FOLIC ACID 1 MG: 1 TABLET ORAL at 10:12

## 2024-12-12 RX ADMIN — APIXABAN 2.5 MG: 2.5 TABLET, FILM COATED ORAL at 10:12

## 2024-12-12 RX ADMIN — APIXABAN 2.5 MG: 2.5 TABLET, FILM COATED ORAL at 08:12

## 2024-12-12 RX ADMIN — MEMANTINE 10 MG: 10 TABLET ORAL at 10:12

## 2024-12-12 NOTE — SUBJECTIVE & OBJECTIVE
Past Medical History:   Diagnosis Date    Advance care planning 8/2/2024    Allergy     Anxiety     Asthma     Atrial fibrillation 6/10/2024    Coronary artery disease 10/18/2018    DM (diabetes mellitus)     Essential hypertension 10/21/2020    IBS (irritable bowel syndrome)     Migraine headache     Mitral valve prolapse     Type 2 diabetes mellitus with hyperlipidemia 9/6/2016       Past Surgical History:   Procedure Laterality Date    BREAST BIOPSY      BREAST LUMPECTOMY      EYE SURGERY      HYSTERECTOMY      partial    MI COLONOSCOPY,REMV LESN,FORCEP/CAUTERY  08/23/2012            Review of patient's allergies indicates:   Allergen Reactions    Codeine      Other reaction(s): Unknown  unknown    Diclofenac Nausea And Vomiting    Doxycycline      Other reaction(s): Unknown  unknown    Iodinated contrast media Hives     Pt reports rxn to iv dye years ago,but no rxn to oral contrast    Sulfa (sulfonamide antibiotics)      Other reaction(s): Unknown  unknown       No current facility-administered medications on file prior to encounter.     Current Outpatient Medications on File Prior to Encounter   Medication Sig    albuterol (PROVENTIL/VENTOLIN HFA) 90 mcg/actuation inhaler Inhale 2 puffs into the lungs every 6 (six) hours as needed for Wheezing or Shortness of Breath.    amoxicillin-clavulanate 875-125mg (AUGMENTIN) 875-125 mg per tablet Take 1 tablet by mouth 2 (two) times daily. for 7 days    apixaban (ELIQUIS) 2.5 mg Tab Take 2.5 mg by mouth 2 (two) times daily.    aspirin 81 MG Chew Take 81 mg by mouth once daily.    benzonatate (TESSALON) 100 MG capsule Take 1 capsule (100 mg total) by mouth 3 (three) times daily as needed for Cough.    blood sugar diagnostic (CONTOUR TEST STRIPS) Strp TEST BLOOD GLUCOSE ONCE DAILY    clotrimazole (LOTRIMIN) 1 % cream Apply topically 2 (two) times daily.    cyanocobalamin (VITAMIN B-12) 500 MCG tablet Take 500 mcg by mouth once daily.    diphenoxylate-atropine 2.5-0.025 mg  (LOMOTIL) 2.5-0.025 mg per tablet Take 1 tablet by mouth 4 (four) times daily as needed for Diarrhea.    ferrous gluconate (FERGON) 324 MG tablet Take 324 mg by mouth daily with breakfast.    fluorometholone 0.1% (FML) 0.1 % DrpS Place 1 drop into both eyes 4 (four) times daily.    fluorouraciL (EFUDEX) 5 % cream Apply 1 application  topically 2 (two) times daily.    FLUoxetine 20 MG capsule Take 1 capsule (20 mg total) by mouth once daily.    folic acid (FOLVITE) 1 MG tablet Take 1 mg by mouth once daily.    furosemide (LASIX) 20 MG tablet Take 20 mg by mouth as needed.    lancets (MICROLET LANCET) Misc TEST BLOOD GLUCOSE ONCE DAILY    memantine (NAMENDA) 10 MG Tab Take 10 mg by mouth 2 (two) times daily.    metFORMIN (GLUCOPHAGE) 500 MG tablet Take 1 tablet (500 mg total) by mouth 2 (two) times daily.    metoprolol succinate (TOPROL-XL) 100 MG 24 hr tablet Take 1 tablet (100 mg total) by mouth once daily.    nystatin (MYCOSTATIN) powder Apply topically 4 (four) times daily.    senna-docusate 8.6-50 mg (SENNA WITH DOCUSATE SODIUM) 8.6-50 mg per tablet Take 2 tablets by mouth 2 (two) times a day.    tamsulosin (FLOMAX) 0.4 mg Cap Take 1 capsule by mouth once daily.    traZODone (DESYREL) 50 MG tablet Take 1 tablet (50 mg total) by mouth every evening.     Family History       Problem Relation (Age of Onset)    Breast cancer Maternal Aunt    Diabetes Brother    Early death Brother    Heart disease Mother, Brother          Tobacco Use    Smoking status: Never    Smokeless tobacco: Never   Substance and Sexual Activity    Alcohol use: No    Drug use: No    Sexual activity: Not Currently     Review of Systems   Constitutional:  Positive for activity change and fatigue. Negative for chills and fever.   HENT:  Negative for trouble swallowing.    Eyes:  Negative for visual disturbance.   Respiratory:  Positive for cough and shortness of breath. Negative for choking, chest tightness, wheezing and stridor.    Cardiovascular:   Negative for chest pain and leg swelling.   Gastrointestinal:  Negative for abdominal pain, constipation, diarrhea, nausea and vomiting.   Genitourinary:  Negative for difficulty urinating.   Musculoskeletal:  Positive for back pain (chronic). Negative for arthralgias.   Skin:  Negative for color change.   Neurological:  Positive for weakness. Negative for seizures, syncope, facial asymmetry, speech difficulty, light-headedness, numbness and headaches.   Psychiatric/Behavioral:  Negative for agitation, behavioral problems and confusion.      Objective:     Vital Signs (Most Recent):  Temp: 97.7 °F (36.5 °C) (12/12/24 0550)  Pulse: 61 (12/12/24 1000)  Resp: 14 (12/12/24 1000)  BP: 133/61 (12/12/24 1000)  SpO2: 95 % (12/12/24 1000) Vital Signs (24h Range):  Temp:  [97.7 °F (36.5 °C)] 97.7 °F (36.5 °C)  Pulse:  [47-61] 61  Resp:  [12-19] 14  SpO2:  [94 %-95 %] 95 %  BP: (133-190)/(61-71) 133/61     Weight: 47.9 kg (105 lb 11.2 oz)  Body mass index is 16.55 kg/m².     Physical Exam  Vitals reviewed.   Constitutional:       General: She is not in acute distress.     Appearance: She is ill-appearing.   HENT:      Head: Normocephalic.   Cardiovascular:      Rate and Rhythm: Normal rate.      Pulses: Normal pulses.   Pulmonary:      Effort: Pulmonary effort is normal. No respiratory distress.   Abdominal:      General: Bowel sounds are normal. There is no distension.      Palpations: Abdomen is soft.      Tenderness: There is no abdominal tenderness.   Genitourinary:     Comments: deferred  Musculoskeletal:         General: Normal range of motion.      Cervical back: Normal range of motion.      Right lower leg: No edema.      Left lower leg: No edema.   Skin:     General: Skin is warm.      Capillary Refill: Capillary refill takes less than 2 seconds.   Neurological:      Mental Status: She is alert and oriented to person, place, and time.   Psychiatric:         Mood and Affect: Mood normal.         Behavior: Behavior  normal.         Thought Content: Thought content normal.                Significant Labs: All pertinent labs within the past 24 hours have been reviewed.  CBC:   Recent Labs   Lab 12/12/24  0655   WBC 11.63   HGB 15.6   HCT 47.0        CMP:   Recent Labs   Lab 12/12/24  0655      K 4.2   CL 99   CO2 26   *   BUN 23   CREATININE 1.0   CALCIUM 9.8   PROT 7.1   ALBUMIN 3.3*   BILITOT 0.5   ALKPHOS 60   AST 14   ALT 14   ANIONGAP 12     Troponin:   Recent Labs   Lab 12/12/24  0655   TROPONINI <0.006       Significant Imaging:   Imaging Results              X-Ray Chest AP Portable (Final result)  Result time 12/12/24 07:23:06      Final result by Shlomo Turner MD (12/12/24 07:23:06)                   Impression:      1.  Findings concerning for a left lower lobe pneumonia with parapneumonic effusion.  Treatment for presumed pneumonia and follow-up x-rays in 4-6 weeks recommended to ensure resolution after adequate treatment.    2.  Stable findings as noted above.      Electronically signed by: Shlomo Turner MD  Date:    12/12/2024  Time:    07:23               Narrative:    EXAMINATION:  XR CHEST AP PORTABLE    CLINICAL HISTORY:  dyspnea;    COMPARISON:  September 6, 2016    FINDINGS:  EKG leads overlie the chest.  Small left effusion.  Patchy left retrocardiac infiltrate.  The lungs are otherwise clear.  The cardiac silhouette size is normal. The trachea is midline and the mediastinal width is normal. Negative for right effusion or pneumothorax.  Pulmonary vasculature is normal. Negative for osseous abnormalities. Tortuous aorta with calcifications of the aortic knob.  Stable cholecystectomy clips.  There are degenerative changes of the spine and both shoulder girdles.

## 2024-12-12 NOTE — PT/OT/SLP EVAL
"Occupational Therapy Evaluation and Treatment    Name: Alon Chatman  MRN: [id number]  Admitting Diagnosis: Community acquired pneumonia  Recent Surgery: * No surgery found *      Recommendations:     Discharge Recommendations: High Intensity Therapy  Level of Assistance Recommended: 24 hours light assistance and 24 hours supervision  Discharge Equipment Recommendations: to be determined by next level of care  Barriers to discharge: None    Assessment:     Alon Chatman is a 84 y.o. female with a medical diagnosis of Community acquired pneumonia. She presents with performance deficits affecting function including weakness, impaired endurance, impaired self care skills, impaired functional mobility, impaired balance, decreased safety awareness, decreased lower extremity function, decreased upper extremity function, pain, impaired skin, impaired fine motor.    Rehab Prognosis: Good; patient would benefit from acute OT services to address these deficits and reach maximum level of function.    Plan:     Patient to be seen 2 x/week to address the above listed problems via self-care/home management, therapeutic activities, therapeutic exercises  Plan of Care Expires: 12/26/24  Plan of Care Reviewed with: patient, daughter    Subjective     Chief Complaint: Reported "I am just scared to hit my head again."  Patient Comments/Goals: increase independence  Pain/Comfort:  Pain Rating 1: 8/10  Location 1: sacral spine  Pain Addressed 1:  (activity pacing)    Social History:  Patient lived at home alone until 1 month ago. She was mod I with RW and ADLs. She sustained fall that resulted in hitting her head. Rapid functional decline since due to extreme fear of falling. PLOF listed below as of the last 3 weeks.   Living Environment: Patient lives with their daughter in a single story home with  no steps/stairs to enter.  Prior Level of Function: Prior to admission, patient  required A with ADLs and stand>pivot " transfers.  Roles and Routines: Patient was not driving and not working prior to admission.  Equipment Used at Home: bedside commode  DME owned (not currently used): none  Assistance Upon Discharge: family    Objective:     Communicated with ER nurse and EPIC prior to session. Patient found supine with telemetry, peripheral IV, blood pressure cuff, pulse ox (continuous) upon OT entry to room.    General Precautions: Standard, fall, hearing impaired   Orthopedic Precautions: N/A   Braces: N/A    Respiratory Status: Room air    Occupational Performance    Gait belt applied - Yes    Bed Mobility:   Supine to sit from left side of bed with moderate assistance  Sit to Supine with moderate assistance and of 2 persons on left side of bed  From ED stretcher- limited ability to actively engage due to this    Functional Mobility/Transfers:  Sit <> Stand Transfer with minimum assistance and 2 persons with rolling walker  Functional Mobility: Side stepping at edge of stretcher with min A of 2 and RW  Gross mobility due to assessment in ER  V/c for technique with transfers to increase safety and independence with completion  Reassurance throughout to decrease fear of falling    Activities of Daily Living:  Grooming: set up assistance  Upper Body Dressing: minimum assistance    Cognitive/Visual Perceptual:  Cognitive/Psychosocial Skills: -     Oriented to: Person, Place, Time, Situation  -     Follows Commands/attention: Follows one-step commands    Physical Exam:  Balance:    -     Sitting: contact guard assistance  -     Standing: minimum assistance and of 2 persons  Upper Extremity Range of Motion:     -       Right Upper Extremity: WFL  -       Left Upper Extremity: WFL  Upper Extremity Strength:    -       Right Upper Extremity: Deficits: grossly 4-/5  -       Left Upper Extremity: Deficits: grossly 4-/5   Strength:    -       Right Upper Extremity: Deficits: poor  -       Left Upper Extremity: Deficits: poor    [facility] 6  Click ADL:  AMPAC Total Score: 16    Treatment & Education:  Therapist provided facilitation and instruction of proper body mechanics, energy conservation, and fall prevention strategies during tasks listed above  Patient educated on role of OT, POC, and goals for therapy  Patient educated on importance of OOB activities with staff member assistance and sitting OOB majority of the day  Encouraged completion of B UE AROM therex throughout the day to increase functional strength and activity tolerance needed for ADL completion.    Unable to transfer to chair as patient seen in ER. Encouraged transfer to bedside chair after transition to standard hospital room with A.    Patient left HOB elevated with all lines intact, call button in reach, and daughter present.    GOALS:   Multidisciplinary Problems       Occupational Therapy Goals          Problem: Occupational Therapy    Goal Priority Disciplines Outcome Interventions   Occupational Therapy Goal     OT, PT/OT     Description: Goals to be met by: 12/26/24     Patient will increase functional independence with ADLs by performing:    Toileting from bedside commode with Stand-by Assistance for hygiene and clothing management.   Toilet transfer to bedside commode with Stand-by Assistance.  Increased functional strength in B UE grossly by 1/2 MM grade.                         History:     Past Medical History:   Diagnosis Date    Advance care planning 8/2/2024    Allergy     Anxiety     Asthma     Atrial fibrillation 6/10/2024    Coronary artery disease 10/18/2018    DM (diabetes mellitus)     Essential hypertension 10/21/2020    IBS (irritable bowel syndrome)     Migraine headache     Mitral valve prolapse     Type 2 diabetes mellitus with hyperlipidemia 9/6/2016         Past Surgical History:   Procedure Laterality Date    BREAST BIOPSY      BREAST LUMPECTOMY      EYE SURGERY      HYSTERECTOMY      partial    AR COLONOSCOPY,JOHANNE SERVIN/CAUTERY  08/23/2012             Time Tracking:     OT Date of Treatment: 12/12/24  OT Start Time: 1045  OT Stop Time: 1115  OT Total Time (min): 30 min    Billable Minutes: Evaluation 15 and Therapeutic Activity 15    Bonnie Monson OT  12/12/2024

## 2024-12-12 NOTE — Clinical Note
Diagnosis: Chest pain [815865]   Future Attending Provider: KRISTIN REYNAGA [[id number]]   Special Needs:: No Special Needs [1]

## 2024-12-12 NOTE — PT/OT/SLP EVAL
"Physical Therapy Evaluation and Treatment    Patient Name:  Alon Chatman   MRN:  [id number]    Recommendations:     Discharge Recommendations: High Intensity Therapy   Discharge Equipment Recommendations: to be determined by next level of care   Barriers to discharge:  PT CURRENTLY HAS 24 HOUR CARE AT HOME WITH DAUGHTER WORKING REMOTELY BUT DAUGHTER REPORTS SHE WILL HAVE TO RETURN TO WORK AWAY FROM HOME    Assessment:     Alon Chatman is a 84 y.o. female admitted with a medical diagnosis of Community acquired pneumonia.  She presents with the following impairments/functional limitations: weakness, impaired endurance, impaired functional mobility, gait instability, impaired balance, pain, decreased safety awareness, decreased lower extremity function, decreased upper extremity function, decreased coordination.    Rehab Prognosis: Fair; patient would benefit from acute skilled PT services to address these deficits and reach maximum level of function.    Recent Surgery: * No surgery found *     Plan:     During this hospitalization, patient to be seen 3 x/week to address the identified rehab impairments via gait training, therapeutic activities, therapeutic exercises and progress toward the following goals:    Plan of Care Expires:  12/26/24    Subjective     Chief Complaint: C/O BOTTOM PAIN BUT AGREEABLE TO PARTICIPATE  Patient/Family Comments/goals: "I WANT TO BE ABLE TO GET STRONGER SO I CAN WALK AGAIN AND PLAY WITH MY GRAND KIDS"  Pain/Comfort:  Pain Rating 1: 8/10  Location 1:  (C/O PAIN AT BOTTOM FROM BED SORE)  Pain Addressed 1: Reposition (OFFLOADING IN BED)    Patients cultural, spiritual, Latter day conflicts given the current situation:      Living Environment:  1 MONTH AGO PT WAS LIVING ALONE FUNCTIONING [place] WITH RW, SINCE THEN SHE HAS BEEN MOSTLY BED BOUND, LIVES WITH DAUGHTER 1 STORY HOUSE NO STEPS TO ENTER, REQUIRES ASSIST FOR BED MOBILITY AND TF'S, DAUGHTER ASSISTS WITH BED<>BEDSIDE COMMODE " TF'S FOR TOILETING AND BED BATH. PT REPORTS HER MOBILITY HAS BEEN LIMITED SINCE A FALL THAT HAS CAUSED HER TO BE EXTREMELY FEARFUL OF OOB MOBILITY  Prior to admission, patients level of function was LIMITED BY FEAR.  Equipment used at home: bedside commode.  DME owned (not currently used): rolling walker and transfer tub bench.  Upon discharge, patient will have assistance from FAMILY.    Objective:     Communicated with NURSE DOWNEY prior to session.  Patient found  SUPINE ON STRETCHER  with telemetry, peripheral IV, PureWick, pulse ox (continuous), blood pressure cuff  upon PT entry to room.    General Precautions: Standard, fall, hearing impaired (H/O PARKINSON'S DISEASE)  Orthopedic Precautions:N/A   Braces: N/A  Respiratory Status: Room air    Exams:  Cognitive Exam:  Patient is oriented to Person, Place, Time, and Situation  Postural Exam:  Patient presented with the following abnormalities:    -       POSTERIOR INSTABILITY IN SITTING AND STANDING-IMPROVED WITH VERBAL AND TACTILE CUES  Sensation:    -       Intact  RLE ROM: WFL  RLE Strength: GROSSLY 4/5  LLE ROM: WFL  LLE Strength: GROSSLY 4/5    Functional Mobility:  Bed Mobility:     Rolling Left:  moderate assistance  Rolling Right: moderate assistance  Scooting: moderate assistance  Bridging: moderate assistance and of 2 persons  Supine to Sit: moderate assistance and of 2 persons  Sit to Supine: moderate assistance and of 2 persons  Transfers:     Sit to Stand:  minimum assistance and of 2 persons with rolling walker  PT REQUIRED FWD CUES FOR UPRIGHT POSTURE DUE TO POSTERIOR INSTABILITY, GOOD COMPLIANCE WITH IMPAIRED BALANCE  PT TOLERATED APPROX. 4 MINUTES ON FEET WITH RW BUT LIMITED BY DIZZINESS  Stand to sit: rebeca x 2 with rw  Gait: PT TOOK APPROX. 6 SMALL SIDE STEPS WITH RW AND REBECA X 2, CUES FOR UPRIGHT POSTURE AND RW SAFETY, GOOD EFFORT  Balance: PT REQUIRED MODA FOR PROPPING AT EOB WITH B FEET ON FLOOR, ONCE AT EOB PT REQUIRED VERBAL AND TACTILE  "CUES FOR UPRIGHT POSTURE, PT WITH  POSTERIOR PUSH FROM FEAR OF FALLING  PT TOLERATED SITTING EOB FOR APPROX. 10 MINUTES WITH MODA PROGRESSED TO CGA  PT C/O DIZZINESS, +ORTHOSTATICS WITH SUP>SIT>STAND    AM-PAC 6 CLICK MOBILITY  Total Score:11     Treatment & Education:  PT AND DAUGHTER EDUCATION:  - ROLE OF P.T. AND POC IN ACUTE CARE HOSPITAL SETTING  - RW USE AND SAFETY DURING TF'S AND GAIT  - ENCOURAGED TO PERFORM THERAPUETIC EXERCISES THROUGHOUT THE DAY TO INCREASE ACTIVITY TOLERANCE AND DECREASE RISK FOR PNEUMONIA AND BLOOD CLOTS: HIP FLEX/EXT, HIP ABD/ADD, QUAD SET, HEEL SLIDE, AP  - RISK FOR FALLS DUE TO GENERALIZED WEAKNESS, EDUCATED ON "CALL DON'T FALL", ENCOURAGED TO CALL FOR ASSISTANCE WITH ALL NEEDS SUCH AS BED<>CHAIR TRANSFERS OR TRIPS TO BATHROOM, PT AGREEABLE TO SAFETY PRECAUTIONS    Patient left HOB elevated with all lines intact, call button in reach, NURSE notified, and DAUGHTER present.    GOALS:   Multidisciplinary Problems       Physical Therapy Goals          Problem: Physical Therapy    Goal Priority Disciplines Outcome Interventions   Physical Therapy Goal     PT, PT/OT     Description: LTG'S TO BE MET IN 14 DAYS (12-26-24)  PT WILL REQUIRE CONSTANTINO FOR BED MOBILITY  PT WILL REQUIRE CONSTANTINO FOR BED<>CHAIR TF'S  PT WILL  FEET WITH RW AND CONSTANTINO  PT WILL INC AMPAC SCORE BY 2 POINTS TO PROGRESS GROSS FUNC MOBILITY                         History:     Past Medical History:   Diagnosis Date    Advance care planning 8/2/2024    Allergy     Anxiety     Asthma     Atrial fibrillation 6/10/2024    Coronary artery disease 10/18/2018    DM (diabetes mellitus)     Essential hypertension 10/21/2020    IBS (irritable bowel syndrome)     Migraine headache     Mitral valve prolapse     Type 2 diabetes mellitus with hyperlipidemia 9/6/2016       Past Surgical History:   Procedure Laterality Date    BREAST BIOPSY      BREAST LUMPECTOMY      EYE SURGERY      HYSTERECTOMY      partial    NE COLONOSCOPY,REMV " JOHANNE HINDS/MARSHA  08/23/2012            Time Tracking:     PT Received On: 12/12/24  PT Start Time: 1100     PT Stop Time: 1130  PT Total Time (min): 30 min     Billable Minutes: Evaluation 15 and Therapeutic Activity 15    12/12/2024

## 2024-12-12 NOTE — ASSESSMENT & PLAN NOTE
-Followed by Dr. Christy (Neurology) outpatient  -Daughter reports they have a follow up appt with Dr. Christy next year and discussed getting MRI due to continued dizziness  -She reports dizziness has not worsened but has not improved  -Will obtain MRI brain while being treated for PNA  -Obtain orthostatics  -TTE pending

## 2024-12-12 NOTE — ASSESSMENT & PLAN NOTE
Patient has a diagnosis of pneumonia. The cause of the pneumonia is unknown at this time. The pneumonia is  currently being treated . The patient has the following signs/symptoms of pneumonia: cough, sputum production, and shortness of breath. The patient does not have a current oxygen requirement and the patient does not have a home oxygen requirement. I have reviewed the pertinent imaging. The following cultures have been collected: Blood cultures and Sputum culture The culture results are listed below.     Current antimicrobial regimen consists of the antibiotics listed below. Will monitor patient closely and continue current treatment plan unchanged.    Antibiotics (From admission, onward)      Start     Stop Route Frequency Ordered    12/13/24 0900  cefTRIAXone injection 1 g         -- IV Every 24 hours (non-standard times) 12/12/24 0947    12/12/24 0915  azithromycin (ZITHROMAX) 500 mg in 0.9% NaCl 250 mL IVPB (admixture device)         -- IV Every 24 hours (non-standard times) 12/12/24 0800            Microbiology Results (last 7 days)       Procedure Component Value Units Date/Time    Blood culture #1 **CANNOT BE ORDERED STAT** [[id number]] Collected: 12/12/24 0828    Order Status: Sent Specimen: Blood from Wrist, Right     Blood culture #2 **CANNOT BE ORDERED STAT** [[id number]] Collected: 12/12/24 0828    Order Status: Sent Specimen: Blood from Peripheral, Antecubital, Right     Culture, Respiratory with Gram Stain [[id number]]     Order Status: No result Specimen: Respiratory     Influenza A & B by Molecular [[id number]] Collected: 12/12/24 0633    Order Status: Completed Specimen: Nasopharyngeal Swab Updated: 12/12/24 0734     Influenza A, Molecular Negative     Influenza B, Molecular Negative     Flu A & B Source Nasal swab

## 2024-12-12 NOTE — PLAN OF CARE
P.T. EVAL COMPLETE.  PT CURRENTLY REQUIRES MODA X 2 FOR BED MOBILITY, CONSTANTINO X 2 FOR TF'S AND TAKING SIDE STEPS WITH RW.  P.T. RECOMMENDS HIGH INTENSITY THERAPY UPON DISCHARGE

## 2024-12-12 NOTE — H&P
Formerly [facility] - Emergency Dept.  [facility] Medicine  History & Physical    Patient Name: Alon Chatman  MRN: [id number]  Patient Class: OP- Observation  Admission Date: 12/12/2024  Attending Physician: Kyaw Miguel MD   Primary Care Provider: Padmini Boucher MD         Patient information was obtained from patient, relative(s), and ER records.     Subjective:     Principal Problem:Community acquired pneumonia    Chief Complaint:   Chief Complaint   Patient presents with    Cough     Pt being dx with bronchitis approx a week ago, finished abx yesterday. C/o lingering cough and new L shoulder pain that started yesterday         HPI: Alon Chatman is an 84 year old female who has  has a past medical history of Advance care planning, Allergy, Anxiety, Asthma, Atrial fibrillation, Coronary artery disease, DM (diabetes mellitus), Essential hypertension, IBS (irritable bowel syndrome), Migraine headache, Mitral valve prolapse, Parkinson's, and Type 2 diabetes mellitus with hyperlipidemia who presented to the ED for mid back pain. Associated symptoms include cough, SOB, and generalized weakness. About one week ago diagnosed with bronchitis and given Augmentin x 10 days to complete. Patient's daughter at bedside states she had one more pill left to take today. Initially cough produced green sputum but now is clear but remains SOB. Denies fever/chills. Daughter also reports being followed by Dr. Christy (neurology) for dizziness. Next follow up appt is not until next year. She is suppose to have an MRI brain with the next visit and daughter is inquiring if it can be done while here. Dizziness has not worsened but remains. ED workup showed: WBC count 11.63K, Hgb/Hct 15.6/47.0, . CXR showed findings concerning for a left lower lobe pneumonia with parapneumonic effusion. Treatment for presumed pneumonia and follow-up x-rays in 4-6 weeks recommended to ensure resolution after adequate treatment. She received one  time dose of Rocephin 1 gm IV in ED. Pt admitted to observation for PNA.    Past Medical History:   Diagnosis Date    Advance care planning 8/2/2024    Allergy     Anxiety     Asthma     Atrial fibrillation 6/10/2024    Coronary artery disease 10/18/2018    DM (diabetes mellitus)     Essential hypertension 10/21/2020    IBS (irritable bowel syndrome)     Migraine headache     Mitral valve prolapse     Type 2 diabetes mellitus with hyperlipidemia 9/6/2016       Past Surgical History:   Procedure Laterality Date    BREAST BIOPSY      BREAST LUMPECTOMY      EYE SURGERY      HYSTERECTOMY      partial    FL COLONOSCOPY,REMV LESN,FORCEP/CAUTERY  08/23/2012            Review of patient's allergies indicates:   Allergen Reactions    Codeine      Other reaction(s): Unknown  unknown    Diclofenac Nausea And Vomiting    Doxycycline      Other reaction(s): Unknown  unknown    Iodinated contrast media Hives     Pt reports rxn to iv dye years ago,but no rxn to oral contrast    Sulfa (sulfonamide antibiotics)      Other reaction(s): Unknown  unknown       No current facility-administered medications on file prior to encounter.     Current Outpatient Medications on File Prior to Encounter   Medication Sig    albuterol (PROVENTIL/VENTOLIN HFA) 90 mcg/actuation inhaler Inhale 2 puffs into the lungs every 6 (six) hours as needed for Wheezing or Shortness of Breath.    amoxicillin-clavulanate 875-125mg (AUGMENTIN) 875-125 mg per tablet Take 1 tablet by mouth 2 (two) times daily. for 7 days    apixaban (ELIQUIS) 2.5 mg Tab Take 2.5 mg by mouth 2 (two) times daily.    aspirin 81 MG Chew Take 81 mg by mouth once daily.    benzonatate (TESSALON) 100 MG capsule Take 1 capsule (100 mg total) by mouth 3 (three) times daily as needed for Cough.    blood sugar diagnostic (CONTOUR TEST STRIPS) Strp TEST BLOOD GLUCOSE ONCE DAILY    clotrimazole (LOTRIMIN) 1 % cream Apply topically 2 (two) times daily.    cyanocobalamin (VITAMIN B-12) 500 MCG  tablet Take 500 mcg by mouth once daily.    diphenoxylate-atropine 2.5-0.025 mg (LOMOTIL) 2.5-0.025 mg per tablet Take 1 tablet by mouth 4 (four) times daily as needed for Diarrhea.    ferrous gluconate (FERGON) 324 MG tablet Take 324 mg by mouth daily with breakfast.    fluorometholone 0.1% (FML) 0.1 % DrpS Place 1 drop into both eyes 4 (four) times daily.    fluorouraciL (EFUDEX) 5 % cream Apply 1 application  topically 2 (two) times daily.    FLUoxetine 20 MG capsule Take 1 capsule (20 mg total) by mouth once daily.    folic acid (FOLVITE) 1 MG tablet Take 1 mg by mouth once daily.    furosemide (LASIX) 20 MG tablet Take 20 mg by mouth as needed.    lancets (MICROLET LANCET) Misc TEST BLOOD GLUCOSE ONCE DAILY    memantine (NAMENDA) 10 MG Tab Take 10 mg by mouth 2 (two) times daily.    metFORMIN (GLUCOPHAGE) 500 MG tablet Take 1 tablet (500 mg total) by mouth 2 (two) times daily.    metoprolol succinate (TOPROL-XL) 100 MG 24 hr tablet Take 1 tablet (100 mg total) by mouth once daily.    nystatin (MYCOSTATIN) powder Apply topically 4 (four) times daily.    senna-docusate 8.6-50 mg (SENNA WITH DOCUSATE SODIUM) 8.6-50 mg per tablet Take 2 tablets by mouth 2 (two) times a day.    tamsulosin (FLOMAX) 0.4 mg Cap Take 1 capsule by mouth once daily.    traZODone (DESYREL) 50 MG tablet Take 1 tablet (50 mg total) by mouth every evening.     Family History       Problem Relation (Age of Onset)    Breast cancer Maternal Aunt    Diabetes Brother    Early death Brother    Heart disease Mother, Brother          Tobacco Use    Smoking status: Never    Smokeless tobacco: Never   Substance and Sexual Activity    Alcohol use: No    Drug use: No    Sexual activity: Not Currently     Review of Systems   Constitutional:  Positive for activity change and fatigue. Negative for chills and fever.   HENT:  Negative for trouble swallowing.    Eyes:  Negative for visual disturbance.   Respiratory:  Positive for cough and shortness of breath.  Negative for choking, chest tightness, wheezing and stridor.    Cardiovascular:  Negative for chest pain and leg swelling.   Gastrointestinal:  Negative for abdominal pain, constipation, diarrhea, nausea and vomiting.   Genitourinary:  Negative for difficulty urinating.   Musculoskeletal:  Positive for back pain (chronic). Negative for arthralgias.   Skin:  Negative for color change.   Neurological:  Positive for weakness. Negative for seizures, syncope, facial asymmetry, speech difficulty, light-headedness, numbness and headaches.   Psychiatric/Behavioral:  Negative for agitation, behavioral problems and confusion.      Objective:     Vital Signs (Most Recent):  Temp: 97.7 °F (36.5 °C) (12/12/24 0550)  Pulse: 61 (12/12/24 1000)  Resp: 14 (12/12/24 1000)  BP: 133/61 (12/12/24 1000)  SpO2: 95 % (12/12/24 1000) Vital Signs (24h Range):  Temp:  [97.7 °F (36.5 °C)] 97.7 °F (36.5 °C)  Pulse:  [47-61] 61  Resp:  [12-19] 14  SpO2:  [94 %-95 %] 95 %  BP: (133-190)/(61-71) 133/61     Weight: 47.9 kg (105 lb 11.2 oz)  Body mass index is 16.55 kg/m².     Physical Exam  Vitals reviewed.   Constitutional:       General: She is not in acute distress.     Appearance: She is ill-appearing.   HENT:      Head: Normocephalic.   Cardiovascular:      Rate and Rhythm: Normal rate.      Pulses: Normal pulses.   Pulmonary:      Effort: Pulmonary effort is normal. No respiratory distress.   Abdominal:      General: Bowel sounds are normal. There is no distension.      Palpations: Abdomen is soft.      Tenderness: There is no abdominal tenderness.   Genitourinary:     Comments: deferred  Musculoskeletal:         General: Normal range of motion.      Cervical back: Normal range of motion.      Right lower leg: No edema.      Left lower leg: No edema.   Skin:     General: Skin is warm.      Capillary Refill: Capillary refill takes less than 2 seconds.   Neurological:      Mental Status: She is alert and oriented to person, place, and time.    Psychiatric:         Mood and Affect: Mood normal.         Behavior: Behavior normal.         Thought Content: Thought content normal.                Significant Labs: All pertinent labs within the past 24 hours have been reviewed.  CBC:   Recent Labs   Lab 12/12/24  0655   WBC 11.63   HGB 15.6   HCT 47.0        CMP:   Recent Labs   Lab 12/12/24  0655      K 4.2   CL 99   CO2 26   *   BUN 23   CREATININE 1.0   CALCIUM 9.8   PROT 7.1   ALBUMIN 3.3*   BILITOT 0.5   ALKPHOS 60   AST 14   ALT 14   ANIONGAP 12     Troponin:   Recent Labs   Lab 12/12/24  0655   TROPONINI <0.006       Significant Imaging:   Imaging Results              X-Ray Chest AP Portable (Final result)  Result time 12/12/24 07:23:06      Final result by Shlomo Turner MD (12/12/24 07:23:06)                   Impression:      1.  Findings concerning for a left lower lobe pneumonia with parapneumonic effusion.  Treatment for presumed pneumonia and follow-up x-rays in 4-6 weeks recommended to ensure resolution after adequate treatment.    2.  Stable findings as noted above.      Electronically signed by: Shlomo Turner MD  Date:    12/12/2024  Time:    07:23               Narrative:    EXAMINATION:  XR CHEST AP PORTABLE    CLINICAL HISTORY:  dyspnea;    COMPARISON:  September 6, 2016    FINDINGS:  EKG leads overlie the chest.  Small left effusion.  Patchy left retrocardiac infiltrate.  The lungs are otherwise clear.  The cardiac silhouette size is normal. The trachea is midline and the mediastinal width is normal. Negative for right effusion or pneumothorax.  Pulmonary vasculature is normal. Negative for osseous abnormalities. Tortuous aorta with calcifications of the aortic knob.  Stable cholecystectomy clips.  There are degenerative changes of the spine and both shoulder girdles.                                     Assessment/Plan:     * Community acquired pneumonia  Patient has a diagnosis of pneumonia. The cause of the  pneumonia is unknown at this time. The pneumonia is  currently being treated . The patient has the following signs/symptoms of pneumonia: cough, sputum production, and shortness of breath. The patient does not have a current oxygen requirement and the patient does not have a home oxygen requirement. I have reviewed the pertinent imaging. The following cultures have been collected: Blood cultures and Sputum culture The culture results are listed below.     Current antimicrobial regimen consists of the antibiotics listed below. Will monitor patient closely and continue current treatment plan unchanged.    Antibiotics (From admission, onward)      Start     Stop Route Frequency Ordered    12/13/24 0900  cefTRIAXone injection 1 g         -- IV Every 24 hours (non-standard times) 12/12/24 0947    12/12/24 0915  azithromycin (ZITHROMAX) 500 mg in 0.9% NaCl 250 mL IVPB (admixture device)         -- IV Every 24 hours (non-standard times) 12/12/24 0800            Microbiology Results (last 7 days)       Procedure Component Value Units Date/Time    Blood culture #1 **CANNOT BE ORDERED STAT** [[id number]] Collected: 12/12/24 0828    Order Status: Sent Specimen: Blood from Wrist, Right     Blood culture #2 **CANNOT BE ORDERED STAT** [[id number]] Collected: 12/12/24 0828    Order Status: Sent Specimen: Blood from Peripheral, Antecubital, Right     Culture, Respiratory with Gram Stain [[id number]]     Order Status: No result Specimen: Respiratory     Influenza A & B by Molecular [[id number]] Collected: 12/12/24 0633    Order Status: Completed Specimen: Nasopharyngeal Swab Updated: 12/12/24 0734     Influenza A, Molecular Negative     Influenza B, Molecular Negative     Flu A & B Source Nasal swab            Dizziness  -Followed by Dr. Christy (Neurology) outpatient  -Daughter reports they have a follow up appt with Dr. Christy next year and discussed getting MRI due to continued dizziness  -She reports dizziness has not worsened but  has not improved  -Will obtain MRI brain while being treated for PNA  -Obtain orthostatics  -TTE pending      Debility  Patient with Acute on chronic debility due to age-related physical debility. The patient's latest AMPAC (Activity Measure for Post Acute Care) Score is listed below.    AM-PAC Score - How much help does the patient need for each activity listed       Plan  - PT/OT consulted  - Fall precautions in place          Atrial fibrillation  Patient has paroxysmal (<7 days) atrial fibrillation. Patient is currently in atrial fibrillation. [id number] Score: 5. The patients heart rate in the last 24 hours is as follows:  Pulse  Min: 47  Max: 61     Antiarrhythmics  metoprolol succinate (TOPROL-XL) 24 hr tablet 100 mg, Daily, Oral    Anticoagulants  apixaban tablet 2.5 mg, 2 times daily, Oral    Plan  - Replete lytes with a goal of K>4, Mg >2  - Patient is anticoagulated, see medications listed above.  - Patient's afib is currently controlled        Visual hallucinations  -Cognitive dysfunction with visual hallucinations  -Followed by Dr. Christy  -Continue Memantine 10 mg po bid      HFrEF (heart failure with reduced ejection fraction)  Patient has Diastolic (HFpEF) heart failure that is Chronic. On presentation their CHF was well compensated. Most recent BNP and echo results are listed below.  Recent Labs     12/12/24  0655   *     Latest ECHO  No results found for this or any previous visit.    Current Heart Failure Medications  metoprolol succinate (TOPROL-XL) 24 hr tablet 100 mg, Daily, Oral    Plan  - Monitor strict I&Os and daily weights.    - Place on telemetry  - Low sodium diet   - Cardiology has not been consulted  - The patient's volume status is at their baseline           Essential hypertension  Patient's blood pressure range in the last 24 hours was: BP  Min: 133/61  Max: 190/63.The patient's inpatient anti-hypertensive regimen is listed below:  Current Antihypertensives  metoprolol succinate  (TOPROL-XL) 24 hr tablet 100 mg, Daily, Oral    Plan  - BP is controlled, no changes needed to their regimen  - Hydralazine PRN    Type 2 diabetes mellitus with hyperlipidemia  Hemoglobin A1C   Date Value Ref Range Status   08/08/2024 6.2 (H) 4.0 - 5.6 % Final     Comment:     ADA Screening Guidelines:  5.7-6.4%  Consistent with prediabetes  >or=6.5%  Consistent with diabetes    High levels of fetal hemoglobin interfere with the HbA1C  assay. Heterozygous hemoglobin variants (HbS, HgC, etc)do  not significantly interfere with this assay.   However, presence of multiple variants may affect accuracy.     05/03/2024 5.4 4.6 - 6.2 % Final   05/04/2023 6.4 (H) 4.0 - 5.6 % Final     Comment:     ADA Screening Guidelines:  5.7-6.4%  Consistent with prediabetes  >or=6.5%  Consistent with diabetes    High levels of fetal hemoglobin interfere with the HbA1C  assay. Heterozygous hemoglobin variants (HbS, HgC, etc)do  not significantly interfere with this assay.   However, presence of multiple variants may affect accuracy.     11/02/2022 6.4 (H) 4.0 - 5.6 % Final     Comment:     ADA Screening Guidelines:  5.7-6.4%  Consistent with prediabetes  >or=6.5%  Consistent with diabetes    High levels of fetal hemoglobin interfere with the HbA1C  assay. Heterozygous hemoglobin variants (HbS, HgC, etc)do  not significantly interfere with this assay.   However, presence of multiple variants may affect accuracy.        -Accuchecks and low dose SSI        VTE Risk Mitigation (From admission, onward)           Ordered     apixaban tablet 2.5 mg  2 times daily         12/12/24 0946     IP VTE HIGH RISK PATIENT  Once         12/12/24 0946     Place sequential compression device  Until discontinued         12/12/24 0946                         On 12/12/2024, patient should be placed in hospital observation services under Dr. Miguel's care in collaboration with Kerrie Cross NP.           Kerrie Cross, CASSIE  Department of Hospital  Medicine  [facility] - Emergency Dept.

## 2024-12-12 NOTE — ASSESSMENT & PLAN NOTE
Patient with Acute on chronic debility due to age-related physical debility. The patient's latest AMPAC (Activity Measure for Post Acute Care) Score is listed below.    AM-PAC Score - How much help does the patient need for each activity listed       Plan  - PT/OT consulted  - Fall precautions in place

## 2024-12-12 NOTE — ASSESSMENT & PLAN NOTE
-Cognitive dysfunction with visual hallucinations  -Followed by Dr. Christy  -Continue Memantine 10 mg po bid

## 2024-12-12 NOTE — ASSESSMENT & PLAN NOTE
Patient's blood pressure range in the last 24 hours was: BP  Min: 133/61  Max: 190/63.The patient's inpatient anti-hypertensive regimen is listed below:  Current Antihypertensives  metoprolol succinate (TOPROL-XL) 24 hr tablet 100 mg, Daily, Oral    Plan  - BP is controlled, no changes needed to their regimen  - Hydralazine PRN

## 2024-12-12 NOTE — ASSESSMENT & PLAN NOTE
Patient has Diastolic (HFpEF) heart failure that is Chronic. On presentation their CHF was well compensated. Most recent BNP and echo results are listed below.  Recent Labs     12/12/24  0655   *     Latest ECHO  No results found for this or any previous visit.    Current Heart Failure Medications  metoprolol succinate (TOPROL-XL) 24 hr tablet 100 mg, Daily, Oral    Plan  - Monitor strict I&Os and daily weights.    - Place on telemetry  - Low sodium diet   - Cardiology has not been consulted  - The patient's volume status is at their baseline

## 2024-12-12 NOTE — ASSESSMENT & PLAN NOTE
Patient has paroxysmal (<7 days) atrial fibrillation. Patient is currently in atrial fibrillation. [id number] Score: 5. The patients heart rate in the last 24 hours is as follows:  Pulse  Min: 47  Max: 61     Antiarrhythmics  metoprolol succinate (TOPROL-XL) 24 hr tablet 100 mg, Daily, Oral    Anticoagulants  apixaban tablet 2.5 mg, 2 times daily, Oral    Plan  - Replete lytes with a goal of K>4, Mg >2  - Patient is anticoagulated, see medications listed above.  - Patient's afib is currently controlled

## 2024-12-12 NOTE — PHARMACY MED REC
"Admission Medication History     The home medication history was taken by Abiola Anderson.    You may go to "Admission" then "Reconcile Home Medications" tabs to review and/or act upon these items.     The home medication list has been updated by the Pharmacy department.   Please read ALL comments highlighted in yellow.   Please address this information as you see fit.    Feel free to contact us if you have any questions or require assistance.      The medications listed below were removed from the home medication list. Please reorder if appropriate:  Patient reports no longer taking the following medication(s):  Tessalon 100 mg  Lotrimin 1% cream  Lomotil 2.5-0.025 mg  Fluorometholone 0.1% drops  Fluorourail 5% cream  Nystatin powder  Trazodone 50 mg    Medications listed below were obtained from: Patient/family and Analytic software- Lidya Raines (daughter at bedside) brought current medication list for patient      LAST MED REC COMPLETED:     Abiola Anderson  [id number]      Current Outpatient Medications on File Prior to Encounter   Medication Sig Dispense Refill Last Dose/Taking    albuterol (PROVENTIL/VENTOLIN HFA) 90 mcg/actuation inhaler Inhale 2 puffs into the lungs every 6 (six) hours as needed for Wheezing or Shortness of Breath. 18 g 0 12/11/2024    amoxicillin-clavulanate 875-125mg (AUGMENTIN) 875-125 mg per tablet Take 1 tablet by mouth 2 (two) times daily. for 7 days 14 tablet 0 12/11/2024    apixaban (ELIQUIS) 2.5 mg Tab Take 2.5 mg by mouth 2 (two) times daily.   12/11/2024    aspirin 81 MG Chew Take 81 mg by mouth once daily.   12/11/2024    cyanocobalamin (VITAMIN B-12) 500 MCG tablet Take 500 mcg by mouth once daily.   Past Week    ferrous gluconate (FERGON) 324 MG tablet Take 324 mg by mouth daily with breakfast.   12/11/2024    FLUoxetine 20 MG capsule Take 1 capsule (20 mg total) by mouth once daily. 30 capsule 11 12/11/2024    folic acid (FOLVITE) 1 MG tablet Take 1 mg by mouth " once daily.   12/11/2024    furosemide (LASIX) 20 MG tablet Take 20 mg by mouth as needed.   Past Month    lancets (MICROLET LANCET) Misc TEST BLOOD GLUCOSE ONCE DAILY 100 each 3 Past Month    memantine (NAMENDA) 10 MG Tab Take 10 mg by mouth 2 (two) times daily.   12/11/2024    metFORMIN (GLUCOPHAGE) 500 MG tablet Take 1 tablet (500 mg total) by mouth 2 (two) times daily. 180 tablet 3 12/11/2024    methocarbamoL (ROBAXIN) 500 MG Tab Take 500 mg by mouth 4 (four) times daily as needed.   Past Month    metoprolol succinate (TOPROL-XL) 100 MG 24 hr tablet Take 1 tablet (100 mg total) by mouth once daily. 90 tablet 3 12/11/2024    oxyCODONE-acetaminophen (PERCOCET) 2.5-325 mg per tablet Take 0.5 tablets by mouth every 4 (four) hours as needed for Pain.   Past Month    rivastigmine (EXELON) 4.6 mg/24 hour PT24 Place 1 patch onto the skin once daily.   12/11/2024    tamsulosin (FLOMAX) 0.4 mg Cap Take 1 capsule by mouth once daily.   12/11/2024    blood sugar diagnostic (CONTOUR TEST STRIPS) Strp TEST BLOOD GLUCOSE ONCE DAILY 100 each 11 More than a month    polyethylene glycol (GLYCOLAX) 17 gram PwPk Take 17 g by mouth 2 (two) times daily.   Unknown    predniSONE (DELTASONE) 20 MG tablet Take 20 mg by mouth once daily.       senna-docusate 8.6-50 mg (SENNA WITH DOCUSATE SODIUM) 8.6-50 mg per tablet Take 2 tablets by mouth 2 (two) times a day.   Unknown                           .

## 2024-12-12 NOTE — ASSESSMENT & PLAN NOTE
Hemoglobin A1C   Date Value Ref Range Status   08/08/2024 6.2 (H) 4.0 - 5.6 % Final     Comment:     ADA Screening Guidelines:  5.7-6.4%  Consistent with prediabetes  >or=6.5%  Consistent with diabetes    High levels of fetal hemoglobin interfere with the HbA1C  assay. Heterozygous hemoglobin variants (HbS, HgC, etc)do  not significantly interfere with this assay.   However, presence of multiple variants may affect accuracy.     05/03/2024 5.4 4.6 - 6.2 % Final   05/04/2023 6.4 (H) 4.0 - 5.6 % Final     Comment:     ADA Screening Guidelines:  5.7-6.4%  Consistent with prediabetes  >or=6.5%  Consistent with diabetes    High levels of fetal hemoglobin interfere with the HbA1C  assay. Heterozygous hemoglobin variants (HbS, HgC, etc)do  not significantly interfere with this assay.   However, presence of multiple variants may affect accuracy.     11/02/2022 6.4 (H) 4.0 - 5.6 % Final     Comment:     ADA Screening Guidelines:  5.7-6.4%  Consistent with prediabetes  >or=6.5%  Consistent with diabetes    High levels of fetal hemoglobin interfere with the HbA1C  assay. Heterozygous hemoglobin variants (HbS, HgC, etc)do  not significantly interfere with this assay.   However, presence of multiple variants may affect accuracy.        -Accuchecks and low dose SSI

## 2024-12-12 NOTE — PLAN OF CARE
Problem: Adult Inpatient Plan of Care  Goal: Plan of Care Review  Outcome: Progressing  Goal: Patient-Specific Goal (Individualized)  Outcome: Progressing  Goal: Absence of Hospital-Acquired Illness or Injury  Outcome: Progressing  Goal: Optimal Comfort and Wellbeing  Outcome: Progressing  Goal: Readiness for Transition of Care  Outcome: Progressing     Problem: Skin Injury Risk Increased  Goal: Skin Health and Integrity  Outcome: Progressing     Problem: Diabetes Comorbidity  Goal: Blood Glucose Level Within Targeted Range  Outcome: Progressing     Problem: Pneumonia  Goal: Fluid Balance  Outcome: Progressing  Goal: Resolution of Infection Signs and Symptoms  Outcome: Progressing  Goal: Effective Oxygenation and Ventilation  Outcome: Progressing

## 2024-12-12 NOTE — ED PROVIDER NOTES
SCRIBE #1 NOTE: I, Blanquita Choi & Areli Ge, am scribing for, and in the presence of, Carl Bernstein Jr., MD. I have scribed the entire note.       History     Chief Complaint   Patient presents with    Cough     Pt being dx with bronchitis approx a week ago, finished abx yesterday. C/o lingering cough and new L shoulder pain that started yesterday      Review of patient's allergies indicates:   Allergen Reactions    Codeine      Other reaction(s): Unknown  unknown    Diclofenac Nausea And Vomiting    Doxycycline      Other reaction(s): Unknown  unknown    Iodinated contrast media Hives     Pt reports rxn to iv dye years ago,but no rxn to oral contrast    Sulfa (sulfonamide antibiotics)      Other reaction(s): Unknown  unknown         History of Present Illness     HPI    12/12/2024, 6:41 AM  History obtained from the daughter and patient      History of Present Illness: Alon Chatman is a 84 y.o. female patient with a PMHx of Parkinson's asthma, atrial fibrillation, mitral valve prolapse, CAD, HTN, IBS, DM type II with hyperlipidemia  who presents to the Emergency Department for evaluation of back pain which began 9:00 AM approx. 1 week ago. Symptoms have progressively worsened since onset. No mitigating factors. Symptoms are exacerbated with lying supinated. Associated sxs include cough. Patient denies any wheezing or sore throat; no Hx of blood clots. She affirms a PMHx of CHF and MI. No prior Tx specified. No further complaints or concerns at this time.  Patient has completed a 1 week course of Augmentin.  She does note some mild orthopnea.  She does have a remote history of CHF.  She denies any chest pain shortness of breath.  She denies any history of PE or DVT      Arrival mode: Ambulance Service    PCP: Padmini Boucher MD        Past Medical History:  Past Medical History:   Diagnosis Date    Advance care planning 8/2/2024    Allergy     Anxiety     Asthma     Atrial fibrillation 6/10/2024     Coronary artery disease 10/18/2018    DM (diabetes mellitus)     Essential hypertension 10/21/2020    IBS (irritable bowel syndrome)     Migraine headache     Mitral valve prolapse     Type 2 diabetes mellitus with hyperlipidemia 9/6/2016       Past Surgical History:  Past Surgical History:   Procedure Laterality Date    BREAST BIOPSY      BREAST LUMPECTOMY      EYE SURGERY      HYSTERECTOMY      partial    IL COLONOSCOPY,REMV LESN,FORCEP/CAUTERY  08/23/2012              Family History:  Family History   Problem Relation Name Age of Onset    Heart disease Mother Jonna     Diabetes Brother Sudarshan     Heart disease Brother Sudarshan     Early death Brother Sudarshan     Breast cancer Maternal Aunt         Social History:  Social History     Tobacco Use    Smoking status: Never    Smokeless tobacco: Never   Substance and Sexual Activity    Alcohol use: No    Drug use: No    Sexual activity: Not Currently        Review of Systems     Review of Systems   Constitutional:  Negative for fever.   HENT:  Negative for sore throat.    Respiratory:  Positive for cough. Negative for shortness of breath and wheezing.    Cardiovascular:  Negative for chest pain.   Gastrointestinal:  Negative for nausea.   Genitourinary:  Negative for dysuria.   Musculoskeletal:  Positive for back pain.   Skin:  Negative for rash.   Neurological:  Negative for weakness.   Hematological:  Does not bruise/bleed easily.   All other systems reviewed and are negative.     Physical Exam     Initial Vitals [12/12/24 0550]   BP Pulse Resp Temp SpO2   (!) 190/63 (!) 53 16 97.7 °F (36.5 °C) 95 %      MAP       --          Physical Exam  Nursing Notes and Vital Signs Reviewed.  Constitutional: Patient is in no acute distress. Well-developed and well-nourished.  Head: Atraumatic. Normocephalic.  Eyes:  EOM intact.  No scleral icterus.  ENT: Mucous membranes are moist.  Nares clear   Neck:  Full ROM. No JVD.  Cardiovascular: Regular rate. Regular rhythm No murmurs, rubs,  "or gallops. Distal pulses are 2+ and symmetric  Pulmonary/Chest: No respiratory distress. Clear to auscultation bilaterally. No wheezing or rales.  Equal chest wall rise bilaterally  Abdominal: Soft and non-distended.  There is no tenderness.  No rebound, guarding, or rigidity. Good bowel sounds.  Genitourinary: No CVA tenderness.  No suprapubic tenderness  Musculoskeletal: Moves all extremities. No obvious deformities.  5 x 5 strength in all extremities   Skin: Warm and dry.  Neurological:  Alert, awake, and appropriate.  Normal speech.  No acute focal neurological deficits are appreciated.  Two through 12 intact bilaterally.  Psychiatric: Normal affect. Good eye contact. Appropriate in content.     ED Course   Procedures  ED Vital Signs:  Vitals:    12/12/24 0550 12/12/24 0626 12/12/24 0631 12/12/24 0725   BP: (!) 190/63  (!) 182/71    Pulse: (!) 53  (!) 49 (!) 50   Resp: 16  19 18   Temp: 97.7 °F (36.5 °C)      TempSrc: Oral      SpO2: 95%  (!) 94% (!) 94%   Weight:  47.9 kg (105 lb 11.2 oz)     Height: 5' 7" (1.702 m)       12/12/24 0730 12/12/24 0735   BP:     Pulse: (!) 47 (!) 48   Resp: 12 15   Temp:     TempSrc:     SpO2: (!) 94% (!) 94%   Weight:     Height:         Abnormal Lab Results:  Labs Reviewed   COMPREHENSIVE METABOLIC PANEL - Abnormal       Result Value    Sodium 137      Potassium 4.2      Chloride 99      CO2 26      Glucose 123 (*)     BUN 23      Creatinine 1.0      Calcium 9.8      Total Protein 7.1      Albumin 3.3 (*)     Total Bilirubin 0.5      Alkaline Phosphatase 60      AST 14      ALT 14      eGFR 56 (*)     Anion Gap 12      Narrative:     Release to patient->Immediate   CBC W/ AUTO DIFFERENTIAL - Abnormal    WBC 11.63      RBC 5.17      Hemoglobin 15.6      Hematocrit 47.0      MCV 91      MCH 30.2      MCHC 33.2      RDW 12.3      Platelets 176      MPV 10.9      Immature Granulocytes 0.5      Gran # (ANC) 9.0 (*)     Immature Grans (Abs) 0.06 (*)     Lymph # 1.4      Mono # 1.1 " (*)     Eos # 0.1      Baso # 0.02      nRBC 0      Gran % 77.5 (*)     Lymph % 11.8 (*)     Mono % 9.5      Eosinophil % 0.5      Basophil % 0.2      Differential Method Automated      Narrative:     Release to patient->Immediate   B-TYPE NATRIURETIC PEPTIDE - Abnormal     (*)     Narrative:     Release to patient->Immediate   INFLUENZA A & B BY MOLECULAR    Influenza A, Molecular Negative      Influenza B, Molecular Negative      Flu A & B Source Nasal swab     CULTURE, BLOOD   CULTURE, BLOOD   CULTURE, RESPIRATORY   HIV 1 / 2 ANTIBODY    HIV 1/2 Ag/Ab Negative      Narrative:     Release to patient->Immediate   TROPONIN I    Troponin I <0.006      Narrative:     Release to patient->Immediate   SARS-COV-2 RNA AMPLIFICATION, QUAL    SARS-CoV-2 RNA, Amplification, Qual Negative          All Lab Results:  Results for orders placed or performed during the hospital encounter of 12/12/24   EKG 12-lead    Collection Time: 12/12/24  6:06 AM   Result Value Ref Range    QRS Duration 102 ms    [facility] QTC Calculation 455 ms   Influenza A & B by Molecular    Collection Time: 12/12/24  6:33 AM    Specimen: Nasopharyngeal Swab   Result Value Ref Range    Influenza A, Molecular Negative Negative    Influenza B, Molecular Negative Negative    Flu A & B Source Nasal swab    COVID-19 Rapid Screening    Collection Time: 12/12/24  6:33 AM   Result Value Ref Range    SARS-CoV-2 RNA, Amplification, Qual Negative Negative   HIV 1/2 Ag/Ab (4th Gen)    Collection Time: 12/12/24  6:55 AM   Result Value Ref Range    HIV 1/2 Ag/Ab Negative Negative   Comprehensive metabolic panel    Collection Time: 12/12/24  6:55 AM   Result Value Ref Range    Sodium 137 136 - 145 mmol/L    Potassium 4.2 3.5 - 5.1 mmol/L    Chloride 99 95 - 110 mmol/L    CO2 26 23 - 29 mmol/L    Glucose 123 (H) 70 - 110 mg/dL    BUN 23 8 - 23 mg/dL    Creatinine 1.0 0.5 - 1.4 mg/dL    Calcium 9.8 8.7 - 10.5 mg/dL    Total Protein 7.1 6.0 - 8.4 g/dL    Albumin 3.3 (L) 3.5  - 5.2 g/dL    Total Bilirubin 0.5 0.1 - 1.0 mg/dL    Alkaline Phosphatase 60 40 - 150 U/L    AST 14 10 - 40 U/L    ALT 14 10 - 44 U/L    eGFR 56 (A) >60 mL/min/1.73 m^2    Anion Gap 12 8 - 16 mmol/L   CBC auto differential    Collection Time: 12/12/24  6:55 AM   Result Value Ref Range    WBC 11.63 3.90 - 12.70 K/uL    RBC 5.17 4.00 - 5.40 M/uL    Hemoglobin 15.6 12.0 - 16.0 g/dL    Hematocrit 47.0 37.0 - 48.5 %    MCV 91 82 - 98 fL    MCH 30.2 27.0 - 31.0 pg    MCHC 33.2 32.0 - 36.0 g/dL    RDW 12.3 11.5 - 14.5 %    Platelets 176 150 - 450 K/uL    MPV 10.9 9.2 - 12.9 fL    Immature Granulocytes 0.5 0.0 - 0.5 %    Gran # (ANC) 9.0 (H) 1.8 - 7.7 K/uL    Immature Grans (Abs) 0.06 (H) 0.00 - 0.04 K/uL    Lymph # 1.4 1.0 - 4.8 K/uL    Mono # 1.1 (H) 0.3 - 1.0 K/uL    Eos # 0.1 0.0 - 0.5 K/uL    Baso # 0.02 0.00 - 0.20 K/uL    nRBC 0 0 /100 WBC    Gran % 77.5 (H) 38.0 - 73.0 %    Lymph % 11.8 (L) 18.0 - 48.0 %    Mono % 9.5 4.0 - 15.0 %    Eosinophil % 0.5 0.0 - 8.0 %    Basophil % 0.2 0.0 - 1.9 %    Differential Method Automated    Brain natriuretic peptide    Collection Time: 12/12/24  6:55 AM   Result Value Ref Range     (H) 0 - 99 pg/mL   Troponin I    Collection Time: 12/12/24  6:55 AM   Result Value Ref Range    Troponin I <0.006 0.000 - 0.026 ng/mL       Imaging Results:  Imaging Results              X-Ray Chest AP Portable (Final result)  Result time 12/12/24 07:23:06      Final result by Shlomo Turner MD (12/12/24 07:23:06)                   Impression:      1.  Findings concerning for a left lower lobe pneumonia with parapneumonic effusion.  Treatment for presumed pneumonia and follow-up x-rays in 4-6 weeks recommended to ensure resolution after adequate treatment.    2.  Stable findings as noted above.      Electronically signed by: Shlomo Turner MD  Date:    12/12/2024  Time:    07:23               Narrative:    EXAMINATION:  XR CHEST AP PORTABLE    CLINICAL  HISTORY:  dyspnea;    COMPARISON:  September 6, 2016    FINDINGS:  EKG leads overlie the chest.  Small left effusion.  Patchy left retrocardiac infiltrate.  The lungs are otherwise clear.  The cardiac silhouette size is normal. The trachea is midline and the mediastinal width is normal. Negative for right effusion or pneumothorax.  Pulmonary vasculature is normal. Negative for osseous abnormalities. Tortuous aorta with calcifications of the aortic knob.  Stable cholecystectomy clips.  There are degenerative changes of the spine and both shoulder girdles.                                     The EKG was ordered, reviewed, and independently interpreted by the ED provider.  Interpretation time: 6:06  Rate: 52 BPM  Rhythm: sinus bradycardia with 1st degree AV block with occasional premature ventricular complexes.  Interpretation: Possible Anteroseptal infarct, age undetermined. No STEMI.           The Emergency Provider reviewed the vital signs and test results, which are outlined above.     ED Discussion     7:58 AM: Discussed case with Dr. Kyaw Miguel ([facility]) who agrees with current care and management of pt and accepts admission.   Admitting Service: [facility]  Admitting Physician: Dr. Miguel  Admit to: Inpatient    7:59 AM: Re-evaluated pt. I have discussed test results, shared treatment plan, and the need for admission with patient/family at bedside. Pt/family express understanding at this time and agree with all information. All questions answered. Pt/family member(s) have no further questions or concerns at this time. Pt is ready for admit.    ED Course as of 12/12/24 0834   u Dec 12, 2024   0655 Cardiac monitor interpretation  Independent interpretation  Indication: Cough  Sinus bradycardia.  Rate 43.  No STEMI [RT]      ED Course User Index  [RT] Carl Bernstein Jr., MD     Medical Decision Making  Differential diagnosis: Pneumonia, bronchitis, DVT, PE, chest pain, pleurisy    Patient  is evaluated history physical examination.  Patient completed a course of Augmentin for bronchitis and has worsening left upper back pain and productive cough refractory to this.  She has a left lower lobe pneumonia on her chest x-ray which he has an outpatient treatment failure of Augmentin.  This is likely source of her pain.  She is being admitted to Hospital Medicine for further evaluation and treatment    Amount and/or Complexity of Data Reviewed  External Data Reviewed: notes.  Labs: ordered. Decision-making details documented in ED Course.     Details: HIV negative troponin undetectable BNP is 147.  CMP is benign.  She has a normal white count but left shift some bands.  Negative flu negative COVID  Radiology: ordered. Decision-making details documented in ED Course.     Details: Left lower lobe pneumonia with parapneumonic effusion  ECG/medicine tests: ordered and independent interpretation performed. Decision-making details documented in ED Course.     Details: No STEMI  Discussion of management or test interpretation with external provider(s): Discussed the case with hospital medicine graciously accepts for admission    Risk  OTC drugs.  Prescription drug management.  Decision regarding hospitalization.                ED Medication(s):  Medications   cefTRIAXone injection 1 g (has no administration in time range)   azithromycin (ZITHROMAX) 500 mg in 0.9% NaCl 250 mL IVPB (admixture device) (has no administration in time range)   albuterol-ipratropium 2.5 mg-0.5 mg/3 mL nebulizer solution 3 mL (3 mLs Nebulization Given 12/12/24 3073)       New Prescriptions    No medications on file               Scribe Attestation:   Scribe #1: I performed the above scribed service and the documentation accurately describes the services I performed. I attest to the accuracy of the note.     Attending:   Physician Attestation Statement for Scribe #1: I, Carl Bernstein Jr., MD, personally performed the services described in  this documentation, as scribed by Blanquita Choi & Areli Ge, in my presence, and it is both accurate and complete.           Clinical Impression       ICD-10-CM ICD-9-CM   1. Pneumonia of left lower lobe due to infectious organism  J18.9 486   2. Chest pain  R07.9 786.50   3. Dyspnea  R06.00 786.09   4. Failure of outpatient treatment  Z78.9 V49.89       Disposition:   Disposition: Admitted  Condition: Stable       Carl Bernstein Jr., MD  12/12/24 0834

## 2024-12-12 NOTE — PLAN OF CARE
OT luciano completed. Sup>sit mod A, sit>stand min A of 2, side stepping with RW and min A of 2. Recommending high intensity intervention at d/c.

## 2024-12-12 NOTE — HPI
Alon Chatman is an 84 year old female who has  has a past medical history of Advance care planning, Allergy, Anxiety, Asthma, Atrial fibrillation, Coronary artery disease, DM (diabetes mellitus), Essential hypertension, IBS (irritable bowel syndrome), Migraine headache, Mitral valve prolapse, Parkinson's, and Type 2 diabetes mellitus with hyperlipidemia who presented to the ED for mid back pain. Associated symptoms include cough, SOB, and generalized weakness. About one week ago diagnosed with bronchitis and given Augmentin x 10 days to complete. Patient's daughter at bedside states she had one more pill left to take today. Initially cough produced green sputum but now is clear but remains SOB. Denies fever/chills. Daughter also reports being followed by Dr. Christy (neurology) for dizziness. Next follow up appt is not until next year. She is suppose to have an MRI brain with the next visit and daughter is inquiring if it can be done while here. Dizziness has not worsened but remains. ED workup showed: WBC count 11.63K, Hgb/Hct 15.6/47.0, . CXR showed findings concerning for a left lower lobe pneumonia with parapneumonic effusion. Treatment for presumed pneumonia and follow-up x-rays in 4-6 weeks recommended to ensure resolution after adequate treatment. She received one time dose of Rocephin 1 gm IV in ED. Pt admitted to observation for PNA.

## 2024-12-13 ENCOUNTER — PATIENT MESSAGE (OUTPATIENT)
Dept: NEUROLOGY | Facility: CLINIC | Age: 84
End: 2024-12-13
Payer: MEDICARE

## 2024-12-13 LAB
ALBUMIN SERPL BCP-MCNC: 2.5 G/DL (ref 3.5–5.2)
ALP SERPL-CCNC: 47 U/L (ref 40–150)
ALT SERPL W/O P-5'-P-CCNC: 7 U/L (ref 10–44)
ANION GAP SERPL CALC-SCNC: 10 MMOL/L (ref 8–16)
AST SERPL-CCNC: 10 U/L (ref 10–40)
BASOPHILS # BLD AUTO: 0.01 K/UL (ref 0–0.2)
BASOPHILS NFR BLD: 0.1 % (ref 0–1.9)
BILIRUB SERPL-MCNC: 0.3 MG/DL (ref 0.1–1)
BUN SERPL-MCNC: 25 MG/DL (ref 8–23)
CALCIUM SERPL-MCNC: 8.8 MG/DL (ref 8.7–10.5)
CHLORIDE SERPL-SCNC: 103 MMOL/L (ref 95–110)
CO2 SERPL-SCNC: 26 MMOL/L (ref 23–29)
CREAT SERPL-MCNC: 1 MG/DL (ref 0.5–1.4)
DIFFERENTIAL METHOD BLD: ABNORMAL
EOSINOPHIL # BLD AUTO: 0.1 K/UL (ref 0–0.5)
EOSINOPHIL NFR BLD: 0.9 % (ref 0–8)
ERYTHROCYTE [DISTWIDTH] IN BLOOD BY AUTOMATED COUNT: 12.4 % (ref 11.5–14.5)
EST. GFR  (NO RACE VARIABLE): 56 ML/MIN/1.73 M^2
GLUCOSE SERPL-MCNC: 119 MG/DL (ref 70–110)
HCT VFR BLD AUTO: 39.5 % (ref 37–48.5)
HGB BLD-MCNC: 12.5 G/DL (ref 12–16)
IMM GRANULOCYTES # BLD AUTO: 0.06 K/UL (ref 0–0.04)
IMM GRANULOCYTES NFR BLD AUTO: 0.5 % (ref 0–0.5)
LYMPHOCYTES # BLD AUTO: 1.3 K/UL (ref 1–4.8)
LYMPHOCYTES NFR BLD: 11.6 % (ref 18–48)
MCH RBC QN AUTO: 29.3 PG (ref 27–31)
MCHC RBC AUTO-ENTMCNC: 31.6 G/DL (ref 32–36)
MCV RBC AUTO: 93 FL (ref 82–98)
MONOCYTES # BLD AUTO: 1.2 K/UL (ref 0.3–1)
MONOCYTES NFR BLD: 10.5 % (ref 4–15)
NEUTROPHILS # BLD AUTO: 8.6 K/UL (ref 1.8–7.7)
NEUTROPHILS NFR BLD: 76.4 % (ref 38–73)
NRBC BLD-RTO: 0 /100 WBC
OHS QRS DURATION: 102 MS
OHS QTC CALCULATION: 455 MS
PLATELET # BLD AUTO: 165 K/UL (ref 150–450)
PMV BLD AUTO: 11.4 FL (ref 9.2–12.9)
POCT GLUCOSE: 132 MG/DL (ref 70–110)
POCT GLUCOSE: 133 MG/DL (ref 70–110)
POCT GLUCOSE: 161 MG/DL (ref 70–110)
POCT GLUCOSE: 201 MG/DL (ref 70–110)
POTASSIUM SERPL-SCNC: 4.1 MMOL/L (ref 3.5–5.1)
PROT SERPL-MCNC: 5.4 G/DL (ref 6–8.4)
RBC # BLD AUTO: 4.27 M/UL (ref 4–5.4)
SODIUM SERPL-SCNC: 139 MMOL/L (ref 136–145)
WBC # BLD AUTO: 11.21 K/UL (ref 3.9–12.7)

## 2024-12-13 PROCEDURE — 25000242 PHARM REV CODE 250 ALT 637 W/ HCPCS: Performed by: STUDENT IN AN ORGANIZED HEALTH CARE EDUCATION/TRAINING PROGRAM

## 2024-12-13 PROCEDURE — 97530 THERAPEUTIC ACTIVITIES: CPT

## 2024-12-13 PROCEDURE — 63600175 PHARM REV CODE 636 W HCPCS: Performed by: NURSE PRACTITIONER

## 2024-12-13 PROCEDURE — 80053 COMPREHEN METABOLIC PANEL: CPT | Performed by: NURSE PRACTITIONER

## 2024-12-13 PROCEDURE — 96372 THER/PROPH/DIAG INJ SC/IM: CPT | Performed by: NURSE PRACTITIONER

## 2024-12-13 PROCEDURE — G0378 HOSPITAL OBSERVATION PER HR: HCPCS

## 2024-12-13 PROCEDURE — 94640 AIRWAY INHALATION TREATMENT: CPT | Mod: XB

## 2024-12-13 PROCEDURE — 94761 N-INVAS EAR/PLS OXIMETRY MLT: CPT

## 2024-12-13 PROCEDURE — 25000003 PHARM REV CODE 250: Performed by: NURSE PRACTITIONER

## 2024-12-13 PROCEDURE — 36415 COLL VENOUS BLD VENIPUNCTURE: CPT | Performed by: NURSE PRACTITIONER

## 2024-12-13 PROCEDURE — 63600175 PHARM REV CODE 636 W HCPCS: Performed by: STUDENT IN AN ORGANIZED HEALTH CARE EDUCATION/TRAINING PROGRAM

## 2024-12-13 PROCEDURE — 97535 SELF CARE MNGMENT TRAINING: CPT

## 2024-12-13 PROCEDURE — 85025 COMPLETE CBC W/AUTO DIFF WBC: CPT | Performed by: NURSE PRACTITIONER

## 2024-12-13 RX ORDER — AZITHROMYCIN 250 MG/1
250 TABLET, FILM COATED ORAL DAILY
Status: DISCONTINUED | OUTPATIENT
Start: 2024-12-14 | End: 2024-12-16 | Stop reason: HOSPADM

## 2024-12-13 RX ADMIN — AZITHROMYCIN MONOHYDRATE 500 MG: 500 INJECTION, POWDER, LYOPHILIZED, FOR SOLUTION INTRAVENOUS at 09:12

## 2024-12-13 RX ADMIN — CEFTRIAXONE 1 G: 1 INJECTION, POWDER, FOR SOLUTION INTRAMUSCULAR; INTRAVENOUS at 09:12

## 2024-12-13 RX ADMIN — FOLIC ACID 1 MG: 1 TABLET ORAL at 09:12

## 2024-12-13 RX ADMIN — SODIUM CHLORIDE, POTASSIUM CHLORIDE, SODIUM LACTATE AND CALCIUM CHLORIDE 500 ML: 600; 310; 30; 20 INJECTION, SOLUTION INTRAVENOUS at 09:12

## 2024-12-13 RX ADMIN — APIXABAN 2.5 MG: 2.5 TABLET, FILM COATED ORAL at 09:12

## 2024-12-13 RX ADMIN — FLUOXETINE HYDROCHLORIDE 20 MG: 20 CAPSULE ORAL at 09:12

## 2024-12-13 RX ADMIN — IPRATROPIUM BROMIDE AND ALBUTEROL SULFATE 3 ML: 2.5; .5 SOLUTION RESPIRATORY (INHALATION) at 07:12

## 2024-12-13 RX ADMIN — MEMANTINE 10 MG: 10 TABLET ORAL at 09:12

## 2024-12-13 RX ADMIN — IPRATROPIUM BROMIDE AND ALBUTEROL SULFATE 3 ML: 2.5; .5 SOLUTION RESPIRATORY (INHALATION) at 01:12

## 2024-12-13 RX ADMIN — INSULIN ASPART 2 UNITS: 100 INJECTION, SOLUTION INTRAVENOUS; SUBCUTANEOUS at 12:12

## 2024-12-13 NOTE — PT/OT/SLP PROGRESS
"Occupational Therapy   Treatment    Name: Alon Chatman  MRN: [id number]  Admitting Diagnosis:  Community acquired pneumonia       Recommendations:     Discharge Recommendations: High Intensity Therapy  Discharge Equipment Recommendations:  to be determined by next level of care  Barriers to discharge:  None    Assessment:     Alon Chatman is a 84 y.o. female with a medical diagnosis of Community acquired pneumonia.  She presents with the following performance deficits affecting function are weakness, impaired endurance, impaired cardiopulmonary response to activity, impaired self care skills, impaired functional mobility, impaired balance, decreased safety awareness, decreased lower extremity function, decreased upper extremity function, impaired skin, pain, impaired fine motor.     Rehab Prognosis:  Fair; patient would benefit from acute skilled OT services to address these deficits and reach maximum level of function.       Plan:     Patient to be seen 2 x/week to address the above listed problems via self-care/home management, therapeutic activities, therapeutic exercises  Plan of Care Expires: 12/26/24  Plan of Care Reviewed with: patient    Subjective     Chief Complaint: "I am dizzy"  Patient/Family Comments/goals: to get stronger   Pain/Comfort:  Pain Rating 1:  (stated pain in neck with positioning decreased)  Pain Addressed 1:  (ax pacing)    Objective:     Communicated with: nurse, Radha, prior to session.  Patient found supine with bed alarm, telemetry, peripheral IV upon OT entry to room.    General Precautions: Standard, fall, hearing impaired    Orthopedic Precautions:N/A  Braces: N/A  Respiratory Status: Room air     Occupational Performance:     Bed Mobility:    Patient completed Scooting/Bridging with minimum assistance and 2 persons  Patient completed Supine to Sit with minimum assistance and 2 persons  Pt able to hailey sitting EOB ~25 mins with SBA for safety 2' to BP. Multiple BP " checks 2' to stated dizziness this date with activity.   Sitting BP read 94/49 then 88/51 with extended time and gross motor movements   Patient completed Sit to Supine with minimum assistance and 2 persons     Functional Mobility/Transfers:  Patient completed Sit <> Stand Transfer with minimum assistance and of 2 persons  with  rolling walker   Pt checked in standing with inability to maintain 2' to dizziness reading 78/40  Functional Mobility: Pt unable to tolerate  extended time with standing at this time 2' to hypotension. Pt performed side stepping with Min A x2 to HOB for repositioning prior to returning supine.    Activities of Daily Living:  Feeding:  pt req positioning of cervical region once in modified side lying with wedge and HOB elevated to successfully self feed. Pt able to perform with set up A.        [facility] 6 Click ADL: 16    Treatment & Education:  Encouraged completion of B UE AROM therex throughout the day to increase functional strength and activity tolerance needed for ADL completion.  OT role, plan of care, progression of goals, importance of continued OOB activity, ADL/functional transfer and mobility retraining, call don't fall, safety precautions, fall prevention.  Pt performed active ROM performing sitting EOB to perform in hopes to increase BP.     Patient left right sidelying with all lines intact and call button in reach    GOALS:   Multidisciplinary Problems       Occupational Therapy Goals          Problem: Occupational Therapy    Goal Priority Disciplines Outcome Interventions   Occupational Therapy Goal     OT, PT/OT Progressing    Description: Goals to be met by: 12/26/24     Patient will increase functional independence with ADLs by performing:    Toileting from bedside commode with Stand-by Assistance for hygiene and clothing management.   Toilet transfer to bedside commode with Stand-by Assistance.  Increased functional strength in B UE grossly by 1/2 MM grade.                          Time Tracking:     OT Date of Treatment: 12/13/24  OT Start Time: 0820  OT Stop Time: 0900  OT Total Time (min): 40 min    Billable Minutes:Self Care/Home Management 23  Therapeutic Activity 17  MINNIE Sandy  A client care conference was performed between the OTR/L and HARTMAN, prior to treatment by HARTMAN, to discuss the patient's status, treatment plan and established goals.      OT/MONICA: MONICA     Number of MONICA visits since last OT visit: 1 12/13/2024

## 2024-12-13 NOTE — PROGRESS NOTES
[facility] Medicine  Progress Note    Patient Name: Alon Chatman  MRN: [id number]  Patient Class: OP- Observation   Admission Date: 12/12/2024  Length of Stay: 0 days  Attending Physician: Kyaw Miguel MD  Primary Care Provider: Padmini Boucher MD        Subjective     Principal Problem:Community acquired pneumonia        HPI:  Alon Chatman is an 84 year old female who has  has a past medical history of Advance care planning, Allergy, Anxiety, Asthma, Atrial fibrillation, Coronary artery disease, DM (diabetes mellitus), Essential hypertension, IBS (irritable bowel syndrome), Migraine headache, Mitral valve prolapse, Parkinson's, and Type 2 diabetes mellitus with hyperlipidemia who presented to the ED for mid back pain. Associated symptoms include cough, SOB, and generalized weakness. About one week ago diagnosed with bronchitis and given Augmentin x 10 days to complete. Patient's daughter at bedside states she had one more pill left to take today. Initially cough produced green sputum but now is clear but remains SOB. Denies fever/chills. Daughter also reports being followed by Dr. Christy (neurology) for dizziness. Next follow up appt is not until next year. She is suppose to have an MRI brain with the next visit and daughter is inquiring if it can be done while here. Dizziness has not worsened but remains. ED workup showed: WBC count 11.63K, Hgb/Hct 15.6/47.0, . CXR showed findings concerning for a left lower lobe pneumonia with parapneumonic effusion. Treatment for presumed pneumonia and follow-up x-rays in 4-6 weeks recommended to ensure resolution after adequate treatment. She received one time dose of Rocephin 1 gm IV in ED. Pt admitted to observation for PNA.    Overview/Hospital Course:  12/13/2024  Continues to do well from a pneumonia standpoint. However, it was found that patient is significantly weak when examined by our therapists' teams and that the  "patient would require high intensity therapy. Referrals to SNF and Rehab placed while antibiotics are continued.    Interval History: NAEON.     Review of Systems  Objective:     Vital Signs (Most Recent):  Temp: 98.9 °F (37.2 °C) (12/13/24 1205)  Pulse: 67 (12/13/24 1205)  Resp: 16 (12/13/24 1205)  BP: (!) 152/69 (12/13/24 1205)  SpO2: 97 % (12/13/24 1205) Vital Signs (24h Range):  Temp:  [97.3 °F (36.3 °C)-98.9 °F (37.2 °C)] 98.9 °F (37.2 °C)  Pulse:  [44-82] 67  Resp:  [14-18] 16  SpO2:  [93 %-98 %] 97 %  BP: ()/(40-69) 152/69     Weight: 47.6 kg (105 lb)  Body mass index is 16.45 kg/m².  No intake or output data in the 24 hours ending 12/13/24 1237      Physical Exam      Vitals Reviewed  GEN: No acute distress, pleasant, body habitus underweight  HEENT: atraumatic and normocephalic  CARDS: regular rate and rhythm, no m/g, pulses palpable in LE  PULM: breathing comfortably on room air, chest symmetric, nonlabored, no abnormal breath sounds on auscultation  ABD: nontender, nondistended, soft, no organomegaly, BS+  Neuro: Alert and oriented x3, CN's I-IX grossly intact, sensation and motor intact; follows directions and answers questions appropriately      Significant Labs: All pertinent labs within the past 24 hours have been reviewed.  BMP:   Recent Labs   Lab 12/13/24 0439   *      K 4.1      CO2 26   BUN 25*   CREATININE 1.0   CALCIUM 8.8     CBC:   Recent Labs   Lab 12/12/24 0655 12/13/24 0439   WBC 11.63 11.21   HGB 15.6 12.5   HCT 47.0 39.5    165     CMP:   Recent Labs   Lab 12/12/24 0655 12/13/24 0439    139   K 4.2 4.1   CL 99 103   CO2 26 26   * 119*   BUN 23 25*   CREATININE 1.0 1.0   CALCIUM 9.8 8.8   PROT 7.1 5.4*   ALBUMIN 3.3* 2.5*   BILITOT 0.5 0.3   ALKPHOS 60 47   AST 14 10   ALT 14 7*   ANIONGAP 12 10     Cardiac Markers:   Recent Labs   Lab 12/12/24  0655   *     Coagulation: No results for input(s): "PT", "INR", "APTT" in the last 48 " "hours.  Lactic Acid: No results for input(s): "LACTATE" in the last 48 hours.  Magnesium: No results for input(s): "MG" in the last 48 hours.  Troponin:   Recent Labs   Lab 12/12/24  0655   TROPONINI <0.006       Significant Imaging: I have reviewed all pertinent imaging results/findings within the past 24 hours.    Imaging Results              X-Ray Chest AP Portable (Final result)  Result time 12/12/24 07:23:06      Final result by Shlomo Turner MD (12/12/24 07:23:06)                   Impression:      1.  Findings concerning for a left lower lobe pneumonia with parapneumonic effusion.  Treatment for presumed pneumonia and follow-up x-rays in 4-6 weeks recommended to ensure resolution after adequate treatment.    2.  Stable findings as noted above.      Electronically signed by: Shlomo Turner MD  Date:    12/12/2024  Time:    07:23               Narrative:    EXAMINATION:  XR CHEST AP PORTABLE    CLINICAL HISTORY:  dyspnea;    COMPARISON:  September 6, 2016    FINDINGS:  EKG leads overlie the chest.  Small left effusion.  Patchy left retrocardiac infiltrate.  The lungs are otherwise clear.  The cardiac silhouette size is normal. The trachea is midline and the mediastinal width is normal. Negative for right effusion or pneumothorax.  Pulmonary vasculature is normal. Negative for osseous abnormalities. Tortuous aorta with calcifications of the aortic knob.  Stable cholecystectomy clips.  There are degenerative changes of the spine and both shoulder girdles.                                        Assessment and Plan     * Community acquired pneumonia  Patient has a diagnosis of pneumonia. The cause of the pneumonia is unknown at this time. The pneumonia is  currently being treated . The patient has the following signs/symptoms of pneumonia: cough, sputum production, and shortness of breath. The patient does not have a current oxygen requirement and the patient does not have a home oxygen requirement. I have " reviewed the pertinent imaging. The following cultures have been collected: Blood cultures and Sputum culture The culture results are listed below.     Current antimicrobial regimen consists of the antibiotics listed below. Will monitor patient closely and continue current treatment plan unchanged.    Antibiotics (From admission, onward)      Start     Stop Route Frequency Ordered    12/13/24 0900  cefTRIAXone injection 1 g         -- IV Every 24 hours (non-standard times) 12/12/24 0947    12/12/24 0915  azithromycin (ZITHROMAX) 500 mg in 0.9% NaCl 250 mL IVPB (admixture device)         -- IV Every 24 hours (non-standard times) 12/12/24 0800            Microbiology Results (last 7 days)       Procedure Component Value Units Date/Time    Blood culture #1 **CANNOT BE ORDERED STAT** [[id number]] Collected: 12/12/24 0828    Order Status: Sent Specimen: Blood from Wrist, Right     Blood culture #2 **CANNOT BE ORDERED STAT** [[id number]] Collected: 12/12/24 0828    Order Status: Sent Specimen: Blood from Peripheral, Antecubital, Right     Culture, Respiratory with Gram Stain [[id number]]     Order Status: No result Specimen: Respiratory     Influenza A & B by Molecular [[id number]] Collected: 12/12/24 0633    Order Status: Completed Specimen: Nasopharyngeal Swab Updated: 12/12/24 0734     Influenza A, Molecular Negative     Influenza B, Molecular Negative     Flu A & B Source Nasal swab            Dizziness  -Followed by Dr. Christy (Neurology) outpatient  -Daughter reports they have a follow up appt with Dr. Christy next year and discussed getting MRI due to continued dizziness  -She reports dizziness has not worsened but has not improved  -Will obtain MRI brain while being treated for PNA  -Obtain orthostatics  -TTE pending      Debility  Patient with Acute on chronic debility due to age-related physical debility. The patient's latest AMPAC (Activity Measure for Post Acute Care) Score is listed below.    AM-PAC Score - How  much help does the patient need for each activity listed       Plan  - PT/OT consulted  - Fall precautions in place          Atrial fibrillation  Patient has paroxysmal (<7 days) atrial fibrillation. Patient is currently in atrial fibrillation. [id number] Score: 5. The patients heart rate in the last 24 hours is as follows:  Pulse  Min: 47  Max: 61     Antiarrhythmics  metoprolol succinate (TOPROL-XL) 24 hr tablet 100 mg, Daily, Oral    Anticoagulants  apixaban tablet 2.5 mg, 2 times daily, Oral    Plan  - Replete lytes with a goal of K>4, Mg >2  - Patient is anticoagulated, see medications listed above.  - Patient's afib is currently controlled        Visual hallucinations  -Cognitive dysfunction with visual hallucinations  -Followed by Dr. Christy  -Continue Memantine 10 mg po bid      HFrEF (heart failure with reduced ejection fraction)  Patient has Diastolic (HFpEF) heart failure that is Chronic. On presentation their CHF was well compensated. Most recent BNP and echo results are listed below.  Recent Labs     12/12/24  0655   *     Latest ECHO  No results found for this or any previous visit.    Current Heart Failure Medications  metoprolol succinate (TOPROL-XL) 24 hr tablet 100 mg, Daily, Oral    Plan  - Monitor strict I&Os and daily weights.    - Place on telemetry  - Low sodium diet   - Cardiology has not been consulted  - The patient's volume status is at their baseline           Essential hypertension  Patient's blood pressure range in the last 24 hours was: BP  Min: 133/61  Max: 190/63.The patient's inpatient anti-hypertensive regimen is listed below:  Current Antihypertensives  metoprolol succinate (TOPROL-XL) 24 hr tablet 100 mg, Daily, Oral    Plan  - BP is controlled, no changes needed to their regimen  - Hydralazine PRN    Type 2 diabetes mellitus with hyperlipidemia  Hemoglobin A1C   Date Value Ref Range Status   08/08/2024 6.2 (H) 4.0 - 5.6 % Final     Comment:     ADA Screening  Guidelines:  5.7-6.4%  Consistent with prediabetes  >or=6.5%  Consistent with diabetes    High levels of fetal hemoglobin interfere with the HbA1C  assay. Heterozygous hemoglobin variants (HbS, HgC, etc)do  not significantly interfere with this assay.   However, presence of multiple variants may affect accuracy.     05/03/2024 5.4 4.6 - 6.2 % Final   05/04/2023 6.4 (H) 4.0 - 5.6 % Final     Comment:     ADA Screening Guidelines:  5.7-6.4%  Consistent with prediabetes  >or=6.5%  Consistent with diabetes    High levels of fetal hemoglobin interfere with the HbA1C  assay. Heterozygous hemoglobin variants (HbS, HgC, etc)do  not significantly interfere with this assay.   However, presence of multiple variants may affect accuracy.     11/02/2022 6.4 (H) 4.0 - 5.6 % Final     Comment:     ADA Screening Guidelines:  5.7-6.4%  Consistent with prediabetes  >or=6.5%  Consistent with diabetes    High levels of fetal hemoglobin interfere with the HbA1C  assay. Heterozygous hemoglobin variants (HbS, HgC, etc)do  not significantly interfere with this assay.   However, presence of multiple variants may affect accuracy.        -Accuchecks and low dose SSI        VTE Risk Mitigation (From admission, onward)           Ordered     apixaban tablet 2.5 mg  2 times daily         12/12/24 0946     IP VTE HIGH RISK PATIENT  Once         12/12/24 0946     Place sequential compression device  Until discontinued         12/12/24 0946                    Discharge Planning   ROCIO:      Code Status: Full Code   Medical Readiness for Discharge Date:                    Please place Justification for DME        Kyaw Miguel MD  Department of Hospital Medicine   [facility] Tele[facility] ([facility])

## 2024-12-13 NOTE — PLAN OF CARE
[facility] Tele[facility] (Hospital)  Initial Discharge Assessment       Primary Care Provider: Padmini Boucher MD    Admission Diagnosis: Dizziness [R42]  Dyspnea [R06.00]  Chest pain [R07.9]  Failure of outpatient treatment [Z78.9]  Pneumonia of left lower lobe due to infectious organism [J18.9]  Postural dizziness with presyncope [R42, R55]    Admission Date: 12/12/2024  Expected Discharge Date:     Transition of Care Barriers: (P) None    Payor: MEDICARE / Plan: MEDICARE PART A & B / Product Type: Government /     Extended Emergency Contact Information  Primary Emergency Contact: SamangelyShlomo   [place]  Mobile Phone: [phone]  Relation: Son  Secondary Emergency Contact: BRITTANIEANDREA  Mobile Phone: [phone]  Relation: Daughter    Discharge Plan A: (P) Rehab  Discharge Plan B: (P) Home Health      [id number]  [place]  [address] [facility]  [address] [place] [address]  Phone: [phone] Fax: [phone]    [id number] - [place] - [address]   [address] [facility] [place] [address]  Phone: [phone] Fax: [phone]      Initial Assessment (most recent)       Adult Discharge Assessment - 12/13/24 1320          Discharge Assessment    Assessment Type Discharge Planning Assessment     Confirmed/corrected address, phone number and insurance Yes     Confirmed Demographics Correct on Facesheet     Source of Information patient;family     When was your last doctors appointment? 12/05/24 (P)      Communicated ROCIO with patient/caregiver Yes (P)      Reason For Admission Pneumonia (P)      People in Home child(jessica), adult (P)      Facility Arrived From: home (P)      Do you expect to return to your current living situation? Yes (P)      Do you have help at home or someone to help you manage your care at home? Yes (P)      Who are your caregiver(s) and their phone number(s)? daughter (P)      Prior to hospitilization cognitive  status: Alert/Oriented (P)      Current cognitive status: Alert/Oriented (P)      Walking or Climbing Stairs Difficulty yes (P)      Walking or Climbing Stairs ambulation difficulty, requires equipment;ambulation difficulty, dependent (P)      Mobility Management Was walking with rolling walker but currently essentially bed bound (P)      Dressing/Bathing Difficulty yes (P)      Dressing/Bathing bathing difficulty, assistance 1 person;dressing difficulty, assistance 1 person (P)      Equipment Currently Used at Home bath bench;bedside commode;walker, rolling (P)      Readmission within 30 days? No (P)      Patient currently being followed by outpatient case management? No (P)      Do you currently have service(s) that help you manage your care at home? Yes (P)      Name and Contact number of agency [facility] (P)      Is the pt/caregiver preference to resume services with current agency Yes (P)      Do you take prescription medications? Yes (P)      Do you have prescription coverage? Yes (P)      Coverage [employer] (P)      Do you have any problems affording any of your prescribed medications? No (P)      Who is going to help you get home at discharge? daughter (P)      How do you get to doctors appointments? family or friend will provide (P)      Are you on dialysis? No (P)      Do you take coumadin? No (P)      Discharge Plan A Rehab (P)      Discharge Plan B Home Health (P)      DME Needed Upon Discharge  none (P)      Discharge Plan discussed with: Patient;Adult children (P)      Transition of Care Barriers None (P)                    Met with patient and daughter Lidya.  Patient was living alone and moderately independent with assistive devices until several weeks ago.  She has become progressively weaker and is currently living with her daughter.  Patient is essentially bed bound currently, unable to walk with her rolling walker. Therapy is recommending inpatient rehabilitation hospital for discharge  plan.

## 2024-12-13 NOTE — PT/OT/SLP PROGRESS
"Physical Therapy Treatment    Patient Name:  Alon Chatman   MRN:  [id number]    Recommendations:     Discharge Recommendations: High Intensity Therapy  Discharge Equipment Recommendations: to be determined by next level of care  Barriers to discharge: Decreased caregiver support-DAUGHTER WILL BE RETURNING TO WORK    Assessment:     Alon Chatman is a 84 y.o. female admitted with a medical diagnosis of Community acquired pneumonia.  She presents with the following impairments/functional limitations: weakness, impaired endurance, impaired functional mobility, gait instability, impaired balance, pain, decreased safety awareness, decreased lower extremity function, decreased upper extremity function, decreased coordination (+ORTHOSTATIC HYPOTENSION).    Rehab Prognosis: Good; patient would benefit from acute skilled PT services to address these deficits and reach maximum level of function.    Recent Surgery: * No surgery found *     Plan:     During this hospitalization, patient to be seen 3 x/week to address the identified rehab impairments via gait training, therapeutic activities, therapeutic exercises and progress toward the following goals:    Plan of Care Expires:  12/26/24    Subjective     Chief Complaint: DIZZINESS WITH SITTING AND STANDING  Patient/Family Comments/goals: "I WANT TO TRY"  Pain/Comfort:  Pain Rating 1:  (NECK DISCOMFORT IN BED)  Pain Addressed 1:  (RELEAVED WITH POSITIONING)      Objective:     Communicated with NURSE RAMOS prior to session.  Patient found supine with bed alarm, telemetry, peripheral IV upon PT entry to room.     General Precautions: Standard, fall, hearing impaired (CHECK VITALS, +ORTHOSTATIC HYPOTENSION)  Orthopedic Precautions: N/A  Braces: N/A  Respiratory Status: Room air     Functional Mobility:  Bed Mobility: GOOD EFFORT WITH ACTIVE PARTICIPATION    Rolling Left:  minimum assistance  Rolling Right: minimum assistance  Scooting: minimum assistance  Bridging: " "moderate assistance and of 2 persons  Supine to Sit: minimum assistance and of 2 persons-LOG ROLLING FOR BED MOBILITY  Sit to Supine: minimum assistance and of 2 persons  Transfers:     Sit to Stand:  minimum assistance and of 2 persons with rolling walker - 2 TRIALS  Bed to Chair: UNABLE TO COMPLETE DUE TO LOW BP  Gait: PT TOLERATED MARCHING IN PLACE AND TAKING SIDE STEPS TO MOVE TOWARD HOB WITH CONSTANTINO X 2, MILD BUCKLING AT RLE BUT NO GROSS LOB  Balance: FAIR STATIC SITTING BALANCE, POOR+ DYNAMIC BALANCE DURING SIDE STEPS  PT TOLERATED SITTING EOB FOR APPROX. 25 MINUTES WITH CGA TO ASSESS BP, REPORTS DIZZINESS THROUGHOUT  ENCOURAGED UPRIGHT SITTING WITH FOCUS ON CV ENDURANCE, HOPING FOR BP TO STABILIZE  PT REPORTS HER BP IS LOWER AT BASELINE  PT EDUCATED IN AND PERFORMED SEATED BLE THEREX 2 SETS OF 10 REPS AROM: HIP FLEX/EXT, LAQ, AP'S  BP IN SITTIN/51, 94/49, 88/51  BP IN STANDIN/40-PT ASSISTED BACK TO BED  PT REPORTS COMFORT IN BED AFTER POSITIONING-R SIDE LYING WITH HOB ELEVATED  PT SET UP FOR BREAKFAST    AM-PAC 6 CLICK MOBILITY  Turning over in bed (including adjusting bedclothes, sheets and blankets)?: 3  Sitting down on and standing up from a chair with arms (e.g., wheelchair, bedside commode, etc.): 3  Moving from lying on back to sitting on the side of the bed?: 3  Moving to and from a bed to a chair (including a wheelchair)?: 3  Need to walk in hospital room?: 3  Climbing 3-5 steps with a railing?: 1  Basic Mobility Total Score: 16     Treatment & Education:  PT EDUCATION:  - ROLE OF P.T. AND POC IN ACUTE CARE HOSPITAL SETTING  - RW USE AND SAFETY DURING TF'S AND GAIT  - ENCOURAGED TO INCREASE TIME OOB IN CHAIR TO TOLERANCE   - TO CONTINUE THERAPUETIC EXERCISES THROUGHOUT THE DAY TO INCREASE ACTIVITY TOLERANCE AND DECREASE RISK FOR PNEUMONIA AND BLOOD CLOTS: HIP FLEX/EXT, HIP ABD/ADD, QUAD SET, HEEL SLIDE, AP  - RISK FOR FALLS DUE TO GENERALIZED WEAKNESS, EDUCATED ON "CALL DON'T FALL", ENCOURAGED " TO CALL FOR ASSISTANCE WITH ALL NEEDS SUCH AS BED<>CHAIR TRANSFERS OR TRIPS TO BATHROOM, PT AGREEABLE TO SAFETY PRECAUTIONS    Patient left HOB elevated with all lines intact, call button in reach, and NURSE notified..    GOALS:   Multidisciplinary Problems       Physical Therapy Goals          Problem: Physical Therapy    Goal Priority Disciplines Outcome Interventions   Physical Therapy Goal     PT, PT/OT Progressing    Description: LTG'S TO BE MET IN 14 DAYS (12-26-24)  PT WILL REQUIRE CONSTANTINO FOR BED MOBILITY  PT WILL REQUIRE CONSTANTINO FOR BED<>CHAIR TF'S  PT WILL  FEET WITH RW AND CONSTANTINO  PT WILL INC AMPAC SCORE BY 2 POINTS TO PROGRESS GROSS FUNC MOBILITY                         Time Tracking:     PT Received On: 12/13/24  PT Start Time: 0820     PT Stop Time: 0900  PT Total Time (min): 40 min     Billable Minutes: Therapeutic Activity 40    Treatment Type: Treatment  PT/PTA: PT     Number of PTA visits since last PT visit: 0     12/13/2024

## 2024-12-13 NOTE — PLAN OF CARE
Problem: Adult Inpatient Plan of Care  Goal: Plan of Care Review  Outcome: Progressing  Goal: Patient-Specific Goal (Individualized)  Outcome: Progressing  Goal: Absence of Hospital-Acquired Illness or Injury  Outcome: Progressing  Goal: Optimal Comfort and Wellbeing  Outcome: Progressing  Goal: Readiness for Transition of Care  Outcome: Progressing     Problem: Skin Injury Risk Increased  Goal: Skin Health and Integrity  Outcome: Progressing     Problem: Diabetes Comorbidity  Goal: Blood Glucose Level Within Targeted Range  Outcome: Progressing     Problem: Pneumonia  Goal: Fluid Balance  Outcome: Progressing  Goal: Resolution of Infection Signs and Symptoms  Outcome: Progressing  Goal: Effective Oxygenation and Ventilation  Outcome: Progressing     Problem: Wound  Goal: Optimal Coping  Outcome: Progressing  Goal: Optimal Functional Ability  Outcome: Progressing  Goal: Absence of Infection Signs and Symptoms  Outcome: Progressing  Goal: Improved Oral Intake  Outcome: Progressing  Goal: Optimal Pain Control and Function  Outcome: Progressing  Goal: Skin Health and Integrity  Outcome: Progressing  Goal: Optimal Wound Healing  Outcome: Progressing     Problem: Fall Injury Risk  Goal: Absence of Fall and Fall-Related Injury  Outcome: Progressing

## 2024-12-13 NOTE — SUBJECTIVE & OBJECTIVE
"Interval History: NAEON.     Review of Systems  Objective:     Vital Signs (Most Recent):  Temp: 98.9 °F (37.2 °C) (12/13/24 1205)  Pulse: 67 (12/13/24 1205)  Resp: 16 (12/13/24 1205)  BP: (!) 152/69 (12/13/24 1205)  SpO2: 97 % (12/13/24 1205) Vital Signs (24h Range):  Temp:  [97.3 °F (36.3 °C)-98.9 °F (37.2 °C)] 98.9 °F (37.2 °C)  Pulse:  [44-82] 67  Resp:  [14-18] 16  SpO2:  [93 %-98 %] 97 %  BP: ()/(40-69) 152/69     Weight: 47.6 kg (105 lb)  Body mass index is 16.45 kg/m².  No intake or output data in the 24 hours ending 12/13/24 1237      Physical Exam      Vitals Reviewed  GEN: No acute distress, pleasant, body habitus underweight  HEENT: atraumatic and normocephalic  CARDS: regular rate and rhythm, no m/g, pulses palpable in LE  PULM: breathing comfortably on room air, chest symmetric, nonlabored, no abnormal breath sounds on auscultation  ABD: nontender, nondistended, soft, no organomegaly, BS+  Neuro: Alert and oriented x3, CN's I-IX grossly intact, sensation and motor intact; follows directions and answers questions appropriately      Significant Labs: All pertinent labs within the past 24 hours have been reviewed.  BMP:   Recent Labs   Lab 12/13/24 0439   *      K 4.1      CO2 26   BUN 25*   CREATININE 1.0   CALCIUM 8.8     CBC:   Recent Labs   Lab 12/12/24  0655 12/13/24 0439   WBC 11.63 11.21   HGB 15.6 12.5   HCT 47.0 39.5    165     CMP:   Recent Labs   Lab 12/12/24  0655 12/13/24  0439    139   K 4.2 4.1   CL 99 103   CO2 26 26   * 119*   BUN 23 25*   CREATININE 1.0 1.0   CALCIUM 9.8 8.8   PROT 7.1 5.4*   ALBUMIN 3.3* 2.5*   BILITOT 0.5 0.3   ALKPHOS 60 47   AST 14 10   ALT 14 7*   ANIONGAP 12 10     Cardiac Markers:   Recent Labs   Lab 12/12/24  0655   *     Coagulation: No results for input(s): "PT", "INR", "APTT" in the last 48 hours.  Lactic Acid: No results for input(s): "LACTATE" in the last 48 hours.  Magnesium: No results for input(s): " ""MG" in the last 48 hours.  Troponin:   Recent Labs   Lab 12/12/24  0655   TROPONINI <0.006       Significant Imaging: I have reviewed all pertinent imaging results/findings within the past 24 hours.    Imaging Results              X-Ray Chest AP Portable (Final result)  Result time 12/12/24 07:23:06      Final result by Shlomo Turner MD (12/12/24 07:23:06)                   Impression:      1.  Findings concerning for a left lower lobe pneumonia with parapneumonic effusion.  Treatment for presumed pneumonia and follow-up x-rays in 4-6 weeks recommended to ensure resolution after adequate treatment.    2.  Stable findings as noted above.      Electronically signed by: Shlomo Turner MD  Date:    12/12/2024  Time:    07:23               Narrative:    EXAMINATION:  XR CHEST AP PORTABLE    CLINICAL HISTORY:  dyspnea;    COMPARISON:  September 6, 2016    FINDINGS:  EKG leads overlie the chest.  Small left effusion.  Patchy left retrocardiac infiltrate.  The lungs are otherwise clear.  The cardiac silhouette size is normal. The trachea is midline and the mediastinal width is normal. Negative for right effusion or pneumothorax.  Pulmonary vasculature is normal. Negative for osseous abnormalities. Tortuous aorta with calcifications of the aortic knob.  Stable cholecystectomy clips.  There are degenerative changes of the spine and both shoulder girdles.                                      "

## 2024-12-13 NOTE — PLAN OF CARE
Pt hailey fair with orthostatic hypotension noted limiting movement. Recommending high intensity intervention upon d/c

## 2024-12-13 NOTE — PLAN OF CARE
12/13/24 1401   Post-Acute Status   Post-Acute Authorization Placement   Post-Acute Placement Status Referrals Sent   Discharge Plan   Discharge Plan A Rehab     Referral sent to [facility].  Accepted.  Unsure if will have bed available on Monday when patient is ready.      [facility] will be back up if bed not available when patient is ready for discharge.

## 2024-12-13 NOTE — PLAN OF CARE
GOOD PARTICIPATION, CONSTANTINO X 2 FOR BED MOBILITY, CONSTANTINO X 2 FOR SIT<>STAND TF AND TAKING SIDE STEPS WITH RW, ONLY LIMITING FACTOR THIS VISIT IS ORTHOSTATIC HYPOTENSION

## 2024-12-13 NOTE — HOSPITAL COURSE
12/13/2024  Continues to do well from a pneumonia standpoint. However, it was found that patient is significantly weak when examined by our therapists' teams and that the patient would require high intensity therapy. Referrals to SNF and Rehab placed while antibiotics are continued.    12/16  Patient admitted for continued respiratory symptoms and weakness after OP treatment for pneumonia. Inpatient treatment for the PNA initiated and was going well but patient found to have significant weakness by PT/OT evaluation and recommended she would require high intensity rehab therapy.  consulted and has been working on placement to a rehab facility. [facility] has a bed and has accepted the patient where she will be transferred.  Follow up with pulmonology OP for pneumonia. Her antibiotic was changed from amoxicillin-clavulanate to Levaquin 500 mg PO daily. Also follow-up with OP neurology for previous complaint of dizziness. I have seen and assessed this patient and determined she is stable for discharge.

## 2024-12-14 LAB
POCT GLUCOSE: 124 MG/DL (ref 70–110)
POCT GLUCOSE: 131 MG/DL (ref 70–110)
POCT GLUCOSE: 160 MG/DL (ref 70–110)
POCT GLUCOSE: 279 MG/DL (ref 70–110)

## 2024-12-14 PROCEDURE — 25000242 PHARM REV CODE 250 ALT 637 W/ HCPCS: Performed by: STUDENT IN AN ORGANIZED HEALTH CARE EDUCATION/TRAINING PROGRAM

## 2024-12-14 PROCEDURE — 63700000 PHARM REV CODE 250 ALT 637 W/O HCPCS: Performed by: STUDENT IN AN ORGANIZED HEALTH CARE EDUCATION/TRAINING PROGRAM

## 2024-12-14 PROCEDURE — 21400001 HC TELEMETRY ROOM

## 2024-12-14 PROCEDURE — 25000003 PHARM REV CODE 250: Performed by: NURSE PRACTITIONER

## 2024-12-14 PROCEDURE — 97530 THERAPEUTIC ACTIVITIES: CPT | Mod: CQ

## 2024-12-14 PROCEDURE — 94761 N-INVAS EAR/PLS OXIMETRY MLT: CPT

## 2024-12-14 PROCEDURE — 63600175 PHARM REV CODE 636 W HCPCS: Performed by: NURSE PRACTITIONER

## 2024-12-14 PROCEDURE — 94640 AIRWAY INHALATION TREATMENT: CPT | Mod: XB

## 2024-12-14 RX ORDER — TALC
3 POWDER (GRAM) TOPICAL NIGHTLY PRN
Status: DISCONTINUED | OUTPATIENT
Start: 2024-12-14 | End: 2024-12-16 | Stop reason: HOSPADM

## 2024-12-14 RX ADMIN — MEMANTINE 10 MG: 10 TABLET ORAL at 09:12

## 2024-12-14 RX ADMIN — IPRATROPIUM BROMIDE AND ALBUTEROL SULFATE 3 ML: 2.5; .5 SOLUTION RESPIRATORY (INHALATION) at 07:12

## 2024-12-14 RX ADMIN — IPRATROPIUM BROMIDE AND ALBUTEROL SULFATE 3 ML: 2.5; .5 SOLUTION RESPIRATORY (INHALATION) at 01:12

## 2024-12-14 RX ADMIN — FOLIC ACID 1 MG: 1 TABLET ORAL at 09:12

## 2024-12-14 RX ADMIN — FLUOXETINE HYDROCHLORIDE 20 MG: 20 CAPSULE ORAL at 09:12

## 2024-12-14 RX ADMIN — AZITHROMYCIN DIHYDRATE 250 MG: 250 TABLET ORAL at 09:12

## 2024-12-14 RX ADMIN — CEFTRIAXONE 1 G: 1 INJECTION, POWDER, FOR SOLUTION INTRAMUSCULAR; INTRAVENOUS at 09:12

## 2024-12-14 RX ADMIN — MELATONIN TAB 3 MG 3 MG: 3 TAB at 09:12

## 2024-12-14 RX ADMIN — APIXABAN 2.5 MG: 2.5 TABLET, FILM COATED ORAL at 09:12

## 2024-12-14 RX ADMIN — INSULIN ASPART 1 UNITS: 100 INJECTION, SOLUTION INTRAVENOUS; SUBCUTANEOUS at 09:12

## 2024-12-14 NOTE — PROGRESS NOTES
[facility] Medicine  Progress Note    Patient Name: Alon Chatman  MRN: [id number]  Patient Class: IP- Inpatient   Admission Date: 12/12/2024  Length of Stay: 0 days  Attending Physician: Kyaw Miguel MD  Primary Care Provider: Padmini Boucher MD        Subjective     Principal Problem:Community acquired pneumonia        HPI:  Alon Chatman is an 84 year old female who has  has a past medical history of Advance care planning, Allergy, Anxiety, Asthma, Atrial fibrillation, Coronary artery disease, DM (diabetes mellitus), Essential hypertension, IBS (irritable bowel syndrome), Migraine headache, Mitral valve prolapse, Parkinson's, and Type 2 diabetes mellitus with hyperlipidemia who presented to the ED for mid back pain. Associated symptoms include cough, SOB, and generalized weakness. About one week ago diagnosed with bronchitis and given Augmentin x 10 days to complete. Patient's daughter at bedside states she had one more pill left to take today. Initially cough produced green sputum but now is clear but remains SOB. Denies fever/chills. Daughter also reports being followed by Dr. Christy (neurology) for dizziness. Next follow up appt is not until next year. She is suppose to have an MRI brain with the next visit and daughter is inquiring if it can be done while here. Dizziness has not worsened but remains. ED workup showed: WBC count 11.63K, Hgb/Hct 15.6/47.0, . CXR showed findings concerning for a left lower lobe pneumonia with parapneumonic effusion. Treatment for presumed pneumonia and follow-up x-rays in 4-6 weeks recommended to ensure resolution after adequate treatment. She received one time dose of Rocephin 1 gm IV in ED. Pt admitted to observation for PNA.    Overview/Hospital Course:  12/13/2024  Continues to do well from a pneumonia standpoint. However, it was found that patient is significantly weak when examined by our therapists' teams and that the  "patient would require high intensity therapy. Referrals to SNF and Rehab placed while antibiotics are continued.    12/14/2024  Continues to work with PT/OT. Placement pending.     Interval history:  See hospital course    Objective:   /63 (BP Location: Right arm, Patient Position: Lying)   Pulse 67   Temp 97.2 °F (36.2 °C) (Oral)   Resp 18   Ht 5' 7" (1.702 m)   Wt 47.6 kg (105 lb)   SpO2 95%   BMI 16.45 kg/m²   No intake or output data in the 24 hours ending 12/14/24 1131    PHYSICAL EXAM  Vitals reviewed  GEN: No acute distress, pleasant, body habitus underweight  HEENT: atraumatic and normocephalic  CARDS: regular rate and rhythm, no m/g, pulses palpable in LE  PULM: breathing comfortably on room air, chest symmetric, nonlabored, no abnormal breath sounds on auscultation  ABD: nontender, nondistended, soft, no organomegaly, BS+  Neuro: Alert and oriented x3, CN's I-IX grossly intact, sensation and motor intact; follows directions and answers questions appropriately    LABS  All labs from the past 24 hours were reviewed.     BMP:   Recent Labs   Lab 12/13/24  0439   *      K 4.1      CO2 26   BUN 25*   CREATININE 1.0   CALCIUM 8.8     CBC:   Recent Labs   Lab 12/13/24  0439   WBC 11.21   HGB 12.5   HCT 39.5        CMP:   Recent Labs   Lab 12/13/24  0439      K 4.1      CO2 26   *   BUN 25*   CREATININE 1.0   CALCIUM 8.8   PROT 5.4*   ALBUMIN 2.5*   BILITOT 0.3   ALKPHOS 47   AST 10   ALT 7*   ANIONGAP 10     Cardiac Markers: No results for input(s): "CKMB", "MYOGLOBIN", "BNP", "TROPISTAT" in the last 48 hours.  Coagulation: No results for input(s): "PT", "INR", "APTT" in the last 48 hours.  Lactic Acid: No results for input(s): "LACTATE" in the last 48 hours.  Magnesium: No results for input(s): "MG" in the last 48 hours.  Troponin: No results for input(s): "TROPONINI", "TROPONINIHS" in the last 48 hours.  TSH:   No results for input(s): "TSH" in the last " "4320 hours.  Urine Studies:   No results for input(s): "COLORU", "APPEARANCEUA", "PHUR", "SPECGRAV", "PROTEINUA", "GLUCUA", "KETONESU", "BILIRUBINUA", "OCCULTUA", "NITRITE", "UROBILINOGEN", "LEUKOCYTESUR", "RBCUA", "WBCUA", "BACTERIA", "SQUAMEPITHEL", "HYALINECASTS" in the last 48 hours.    Invalid input(s): "WRIGHTSUR"    IMAGING  All imaging from the past 24 hours were reviewed.     Imaging Results              X-Ray Chest AP Portable (Final result)  Result time 12/12/24 07:23:06      Final result by Shlomo Turner MD (12/12/24 07:23:06)                   Impression:      1.  Findings concerning for a left lower lobe pneumonia with parapneumonic effusion.  Treatment for presumed pneumonia and follow-up x-rays in 4-6 weeks recommended to ensure resolution after adequate treatment.    2.  Stable findings as noted above.      Electronically signed by: Shlomo Turner MD  Date:    12/12/2024  Time:    07:23               Narrative:    EXAMINATION:  XR CHEST AP PORTABLE    CLINICAL HISTORY:  dyspnea;    COMPARISON:  September 6, 2016    FINDINGS:  EKG leads overlie the chest.  Small left effusion.  Patchy left retrocardiac infiltrate.  The lungs are otherwise clear.  The cardiac silhouette size is normal. The trachea is midline and the mediastinal width is normal. Negative for right effusion or pneumothorax.  Pulmonary vasculature is normal. Negative for osseous abnormalities. Tortuous aorta with calcifications of the aortic knob.  Stable cholecystectomy clips.  There are degenerative changes of the spine and both shoulder girdles.                                        Assessment and Plan     * Community acquired pneumonia  Patient has a diagnosis of pneumonia. The cause of the pneumonia is unknown at this time. The pneumonia is  currently being treated . The patient has the following signs/symptoms of pneumonia: cough, sputum production, and shortness of breath. The patient does not have a current oxygen requirement " and the patient does not have a home oxygen requirement. I have reviewed the pertinent imaging. The following cultures have been collected: Blood cultures and Sputum culture The culture results are listed below.     Current antimicrobial regimen consists of the antibiotics listed below. Will monitor patient closely and continue current treatment plan unchanged.    Antibiotics (From admission, onward)      Start     Stop Route Frequency Ordered    12/13/24 0900  cefTRIAXone injection 1 g         -- IV Every 24 hours (non-standard times) 12/12/24 0947    12/12/24 0915  azithromycin (ZITHROMAX) 500 mg in 0.9% NaCl 250 mL IVPB (admixture device)         -- IV Every 24 hours (non-standard times) 12/12/24 0800            Microbiology Results (last 7 days)       Procedure Component Value Units Date/Time    Blood culture #1 **CANNOT BE ORDERED STAT** [[id number]] Collected: 12/12/24 0828    Order Status: Sent Specimen: Blood from Wrist, Right     Blood culture #2 **CANNOT BE ORDERED STAT** [[id number]] Collected: 12/12/24 0828    Order Status: Sent Specimen: Blood from Peripheral, Antecubital, Right     Culture, Respiratory with Gram Stain [[id number]]     Order Status: No result Specimen: Respiratory     Influenza A & B by Molecular [[id number]] Collected: 12/12/24 0633    Order Status: Completed Specimen: Nasopharyngeal Swab Updated: 12/12/24 0734     Influenza A, Molecular Negative     Influenza B, Molecular Negative     Flu A & B Source Nasal swab            Dizziness  -Followed by Dr. Christy (Neurology) outpatient  -Daughter reports they have a follow up appt with Dr. Christy next year and discussed getting MRI due to continued dizziness  -She reports dizziness has not worsened but has not improved  -Will obtain MRI brain while being treated for PNA  -Obtain orthostatics  -TTE pending      Debility  Patient with Acute on chronic debility due to age-related physical debility. The patient's latest [facility] (Activity Measure for  Post Acute Care) Score is listed below.    AM-PAC Score - How much help does the patient need for each activity listed       Plan  - PT/OT consulted  - Fall precautions in place          Atrial fibrillation  Patient has paroxysmal (<7 days) atrial fibrillation. Patient is currently in atrial fibrillation. [id number] Score: 5. The patients heart rate in the last 24 hours is as follows:  Pulse  Min: 47  Max: 61     Antiarrhythmics  metoprolol succinate (TOPROL-XL) 24 hr tablet 100 mg, Daily, Oral    Anticoagulants  apixaban tablet 2.5 mg, 2 times daily, Oral    Plan  - Replete lytes with a goal of K>4, Mg >2  - Patient is anticoagulated, see medications listed above.  - Patient's afib is currently controlled        Visual hallucinations  -Cognitive dysfunction with visual hallucinations  -Followed by Dr. Christy  -Continue Memantine 10 mg po bid      HFrEF (heart failure with reduced ejection fraction)  Patient has Diastolic (HFpEF) heart failure that is Chronic. On presentation their CHF was well compensated. Most recent BNP and echo results are listed below.  Recent Labs     12/12/24  0655   *     Latest ECHO  No results found for this or any previous visit.    Current Heart Failure Medications  metoprolol succinate (TOPROL-XL) 24 hr tablet 100 mg, Daily, Oral    Plan  - Monitor strict I&Os and daily weights.    - Place on telemetry  - Low sodium diet   - Cardiology has not been consulted  - The patient's volume status is at their baseline           Essential hypertension  Patient's blood pressure range in the last 24 hours was: BP  Min: 133/61  Max: 190/63.The patient's inpatient anti-hypertensive regimen is listed below:  Current Antihypertensives  metoprolol succinate (TOPROL-XL) 24 hr tablet 100 mg, Daily, Oral    Plan  - BP is controlled, no changes needed to their regimen  - Hydralazine PRN    Type 2 diabetes mellitus with hyperlipidemia  Hemoglobin A1C   Date Value Ref Range Status   08/08/2024 6.2 (H)  4.0 - 5.6 % Final     Comment:     ADA Screening Guidelines:  5.7-6.4%  Consistent with prediabetes  >or=6.5%  Consistent with diabetes    High levels of fetal hemoglobin interfere with the HbA1C  assay. Heterozygous hemoglobin variants (HbS, HgC, etc)do  not significantly interfere with this assay.   However, presence of multiple variants may affect accuracy.     05/03/2024 5.4 4.6 - 6.2 % Final   05/04/2023 6.4 (H) 4.0 - 5.6 % Final     Comment:     ADA Screening Guidelines:  5.7-6.4%  Consistent with prediabetes  >or=6.5%  Consistent with diabetes    High levels of fetal hemoglobin interfere with the HbA1C  assay. Heterozygous hemoglobin variants (HbS, HgC, etc)do  not significantly interfere with this assay.   However, presence of multiple variants may affect accuracy.     11/02/2022 6.4 (H) 4.0 - 5.6 % Final     Comment:     ADA Screening Guidelines:  5.7-6.4%  Consistent with prediabetes  >or=6.5%  Consistent with diabetes    High levels of fetal hemoglobin interfere with the HbA1C  assay. Heterozygous hemoglobin variants (HbS, HgC, etc)do  not significantly interfere with this assay.   However, presence of multiple variants may affect accuracy.        -Accuchecks and low dose SSI        VTE Risk Mitigation (From admission, onward)           Ordered     apixaban tablet 2.5 mg  2 times daily         12/12/24 0946     IP VTE HIGH RISK PATIENT  Once         12/12/24 0946     Place sequential compression device  Until discontinued         12/12/24 0946                    Discharge Planning   ROCIO:      Code Status: Full Code   Medical Readiness for Discharge Date:   Discharge Plan A: Rehab                Please place Justification for DME        Kyaw Miguel MD  Department of Hospital Medicine   [place] - Telemetry ([facility])

## 2024-12-14 NOTE — PT/OT/SLP PROGRESS
Physical Therapy Treatment    Patient Name:  Alon Chatman   MRN:  [id number]    Recommendations:     Discharge Recommendations: High Intensity Therapy  Discharge Equipment Recommendations: to be determined by next level of care  Barriers to discharge: Decreased caregiver support    Assessment:     Alon Chatman is a 84 y.o. female admitted with a medical diagnosis of Community acquired pneumonia.  She presents with the following impairments/functional limitations: weakness, impaired endurance, gait instability, impaired balance, impaired functional mobility, impaired skin, pain, decreased safety awareness, decreased lower extremity function, decreased upper extremity function, decreased coordination.    Rehab Prognosis: Good; patient would benefit from acute skilled PT services to address these deficits and reach maximum level of function.    Recent Surgery: * No surgery found *      Plan:     During this hospitalization, patient to be seen 3 x/week to address the identified rehab impairments via gait training, therapeutic activities, therapeutic exercises and progress toward the following goals:    Plan of Care Expires:  12/26/24    Subjective     Chief Complaint: dizziness  Patient/Family Comments/goals: agreeable to tx  Pain/Comfort:  Pain Rating 1: 10/10  Location 1: sacral spine (secondary to wound)  Pain Addressed 1: Reposition  Pain Rating Post-Intervention 1: 10/10      Objective:     Communicated with FAYE Prakash prior to session.  Patient found left side lying with HOB elevated with pressure relief boots, peripheral IV upon PT entry to room.     General Precautions: Standard, fall, hearing impaired  Orthopedic Precautions: N/A  Braces: N/A  Respiratory Status: Room air     Functional Mobility:  Bed Mobility:     Rolling Right: minimum assistance  For repositioning  Scooting to EOB: contact guard assistance  Scooting laterally to HOB: contact guard assistance  Pt was instructed for technique  including weight-shifting to clear buttocks to reduce friction at sacrum. Pt with good return demo.  Supine to Sit: minimum assistance for trunk right  Cued for technique. Increased time required  Sit to Supine: minimum assistance for B LE management  Transfers:   Pt declined due to dizziness.  Balance:   Sitting: Fair      AM-PAC 6 CLICK MOBILITY  Turning over in bed (including adjusting bedclothes, sheets and blankets)?: 3  Sitting down on and standing up from a chair with arms (e.g., wheelchair, bedside commode, etc.): 3  Moving from lying on back to sitting on the side of the bed?: 3  Moving to and from a bed to a chair (including a wheelchair)?: 3  Need to walk in hospital room?: 3  Climbing 3-5 steps with a railing?: 1  Basic Mobility Total Score: 16       Treatment & Education:  Pt was educated on the role of therapy, the goals of the session, benefits of out of bed mobility, proper transfer techniques, and pressure relief techniques. Pt with good understanding.    Instructed on the use of call button and calling nursing staff for assistance with mobility.    Pt questions and concerns addressed within PTA scope of practice.    Patient left right sidelying with wedge cushion positioned underneath with all lines intact and call button in reach.    GOALS:   Multidisciplinary Problems       Physical Therapy Goals          Problem: Physical Therapy    Goal Priority Disciplines Outcome Interventions   Physical Therapy Goal     PT, PT/OT Progressing    Description: LTG'S TO BE MET IN 14 DAYS (12-26-24)  PT WILL REQUIRE CONSTANTINO FOR BED MOBILITY  PT WILL REQUIRE CONSTANTINO FOR BED<>CHAIR TF'S  PT WILL  FEET WITH RW AND CONSTANTINO  PT WILL INC AMPAC SCORE BY 2 POINTS TO PROGRESS GROSS FUNC MOBILITY                         Time Tracking:     PT Received On: 12/14/24  PT Start Time: 1022     PT Stop Time: 1035  PT Total Time (min): 13 min     Billable Minutes: Therapeutic Activity 13    Treatment Type: Treatment  PT/PTA: PTA      Number of PTA visits since last PT visit: 1     12/14/2024

## 2024-12-15 LAB
POCT GLUCOSE: 155 MG/DL (ref 70–110)
POCT GLUCOSE: 157 MG/DL (ref 70–110)
POCT GLUCOSE: 158 MG/DL (ref 70–110)
POCT GLUCOSE: 215 MG/DL (ref 70–110)

## 2024-12-15 PROCEDURE — 63700000 PHARM REV CODE 250 ALT 637 W/O HCPCS: Performed by: STUDENT IN AN ORGANIZED HEALTH CARE EDUCATION/TRAINING PROGRAM

## 2024-12-15 PROCEDURE — 25000003 PHARM REV CODE 250: Performed by: NURSE PRACTITIONER

## 2024-12-15 PROCEDURE — 94761 N-INVAS EAR/PLS OXIMETRY MLT: CPT

## 2024-12-15 PROCEDURE — 94640 AIRWAY INHALATION TREATMENT: CPT

## 2024-12-15 PROCEDURE — 21400001 HC TELEMETRY ROOM

## 2024-12-15 PROCEDURE — 97116 GAIT TRAINING THERAPY: CPT

## 2024-12-15 PROCEDURE — 25000242 PHARM REV CODE 250 ALT 637 W/ HCPCS: Performed by: STUDENT IN AN ORGANIZED HEALTH CARE EDUCATION/TRAINING PROGRAM

## 2024-12-15 PROCEDURE — 97530 THERAPEUTIC ACTIVITIES: CPT

## 2024-12-15 PROCEDURE — 63600175 PHARM REV CODE 636 W HCPCS: Performed by: NURSE PRACTITIONER

## 2024-12-15 RX ADMIN — APIXABAN 2.5 MG: 2.5 TABLET, FILM COATED ORAL at 09:12

## 2024-12-15 RX ADMIN — IPRATROPIUM BROMIDE AND ALBUTEROL SULFATE 3 ML: 2.5; .5 SOLUTION RESPIRATORY (INHALATION) at 08:12

## 2024-12-15 RX ADMIN — IPRATROPIUM BROMIDE AND ALBUTEROL SULFATE 3 ML: 2.5; .5 SOLUTION RESPIRATORY (INHALATION) at 02:12

## 2024-12-15 RX ADMIN — APIXABAN 2.5 MG: 2.5 TABLET, FILM COATED ORAL at 08:12

## 2024-12-15 RX ADMIN — FLUOXETINE HYDROCHLORIDE 20 MG: 20 CAPSULE ORAL at 09:12

## 2024-12-15 RX ADMIN — CEFTRIAXONE 1 G: 1 INJECTION, POWDER, FOR SOLUTION INTRAMUSCULAR; INTRAVENOUS at 09:12

## 2024-12-15 RX ADMIN — ONDANSETRON 4 MG: 2 INJECTION INTRAMUSCULAR; INTRAVENOUS at 11:12

## 2024-12-15 RX ADMIN — FOLIC ACID 1 MG: 1 TABLET ORAL at 09:12

## 2024-12-15 RX ADMIN — MELATONIN TAB 3 MG 3 MG: 3 TAB at 08:12

## 2024-12-15 RX ADMIN — MEMANTINE 10 MG: 10 TABLET ORAL at 08:12

## 2024-12-15 RX ADMIN — INSULIN ASPART 2 UNITS: 100 INJECTION, SOLUTION INTRAVENOUS; SUBCUTANEOUS at 03:12

## 2024-12-15 RX ADMIN — IPRATROPIUM BROMIDE AND ALBUTEROL SULFATE 3 ML: 2.5; .5 SOLUTION RESPIRATORY (INHALATION) at 07:12

## 2024-12-15 RX ADMIN — MEMANTINE 10 MG: 10 TABLET ORAL at 09:12

## 2024-12-15 RX ADMIN — AZITHROMYCIN DIHYDRATE 250 MG: 250 TABLET ORAL at 09:12

## 2024-12-15 NOTE — PLAN OF CARE
GOOD PARTICIPATION, BP STILL LOW WITH UPRIGHT ACTIVITY BUT IMPROVED ENOUGH TO PROGRESS TO TIME OOB IN CHAIR AND SHORT DISTANCE GAIT WITH RW

## 2024-12-15 NOTE — PROGRESS NOTES
[facility] Medicine  Progress Note    Patient Name: Alon Chatman  MRN: [id number]  Patient Class: IP- Inpatient   Admission Date: 12/12/2024  Length of Stay: 1 days  Attending Physician: Kyaw Miguel MD  Primary Care Provider: Padmini Boucher MD        Subjective     Principal Problem:Community acquired pneumonia        HPI:  Alon Chatman is an 84 year old female who has  has a past medical history of Advance care planning, Allergy, Anxiety, Asthma, Atrial fibrillation, Coronary artery disease, DM (diabetes mellitus), Essential hypertension, IBS (irritable bowel syndrome), Migraine headache, Mitral valve prolapse, Parkinson's, and Type 2 diabetes mellitus with hyperlipidemia who presented to the ED for mid back pain. Associated symptoms include cough, SOB, and generalized weakness. About one week ago diagnosed with bronchitis and given Augmentin x 10 days to complete. Patient's daughter at bedside states she had one more pill left to take today. Initially cough produced green sputum but now is clear but remains SOB. Denies fever/chills. Daughter also reports being followed by Dr. Christy (neurology) for dizziness. Next follow up appt is not until next year. She is suppose to have an MRI brain with the next visit and daughter is inquiring if it can be done while here. Dizziness has not worsened but remains. ED workup showed: WBC count 11.63K, Hgb/Hct 15.6/47.0, . CXR showed findings concerning for a left lower lobe pneumonia with parapneumonic effusion. Treatment for presumed pneumonia and follow-up x-rays in 4-6 weeks recommended to ensure resolution after adequate treatment. She received one time dose of Rocephin 1 gm IV in ED. Pt admitted to observation for PNA.    Overview/Hospital Course:  12/13/2024  Continues to do well from a pneumonia standpoint. However, it was found that patient is significantly weak when examined by our therapists' teams and that the  "patient would require high intensity therapy. Referrals to SNF and Rehab placed while antibiotics are continued.    12/14/2024  Continues to work with PT/OT. Placement pending.     12/15/2024  Continues to work well with PT/OT. Could tolerate therapy 3x/day. Placement pending    Interval history:  See hospital course    Objective:   BP (!) 141/63 (BP Location: Right arm, Patient Position: Lying)   Pulse 87   Temp 97.8 °F (36.6 °C) (Oral)   Resp 18   Ht 5' 7" (1.702 m)   Wt 47.9 kg (105 lb 9.6 oz)   SpO2 95%   BMI 16.54 kg/m²     Intake/Output Summary (Last 24 hours) at 12/15/2024 1435  Last data filed at 12/15/2024 1418  Gross per 24 hour   Intake 750 ml   Output --   Net 750 ml       PHYSICAL EXAM  Vitals reviewed  GEN: No acute distress, pleasant, body habitus underweight  HEENT: atraumatic and normocephalic  CARDS: regular rate and rhythm, no m/g, pulses palpable in LE  PULM: breathing comfortably on room air, chest symmetric, nonlabored, no abnormal breath sounds on auscultation  ABD: nontender, nondistended, soft, no organomegaly, BS+  Neuro: Alert and oriented x3, CN's I-IX grossly intact, sensation and motor intact; follows directions and answers questions appropriately    LABS  All labs from the past 24 hours were reviewed.       IMAGING  All imaging from the past 24 hours were reviewed.     Imaging Results              X-Ray Chest AP Portable (Final result)  Result time 12/12/24 07:23:06      Final result by Shlomo Turner MD (12/12/24 07:23:06)                   Impression:      1.  Findings concerning for a left lower lobe pneumonia with parapneumonic effusion.  Treatment for presumed pneumonia and follow-up x-rays in 4-6 weeks recommended to ensure resolution after adequate treatment.    2.  Stable findings as noted above.      Electronically signed by: Shlomo Turner MD  Date:    12/12/2024  Time:    07:23               Narrative:    EXAMINATION:  XR CHEST AP PORTABLE    CLINICAL " HISTORY:  dyspnea;    COMPARISON:  September 6, 2016    FINDINGS:  EKG leads overlie the chest.  Small left effusion.  Patchy left retrocardiac infiltrate.  The lungs are otherwise clear.  The cardiac silhouette size is normal. The trachea is midline and the mediastinal width is normal. Negative for right effusion or pneumothorax.  Pulmonary vasculature is normal. Negative for osseous abnormalities. Tortuous aorta with calcifications of the aortic knob.  Stable cholecystectomy clips.  There are degenerative changes of the spine and both shoulder girdles.                                        Assessment and Plan     * Community acquired pneumonia  Patient has a diagnosis of pneumonia. The cause of the pneumonia is unknown at this time. The pneumonia is  currently being treated . The patient has the following signs/symptoms of pneumonia: cough, sputum production, and shortness of breath. The patient does not have a current oxygen requirement and the patient does not have a home oxygen requirement. I have reviewed the pertinent imaging. The following cultures have been collected: Blood cultures and Sputum culture The culture results are listed below.     Current antimicrobial regimen consists of the antibiotics listed below. Will monitor patient closely and continue current treatment plan unchanged.    Antibiotics (From admission, onward)      Start     Stop Route Frequency Ordered    12/13/24 0900  cefTRIAXone injection 1 g         -- IV Every 24 hours (non-standard times) 12/12/24 0947    12/12/24 0915  azithromycin (ZITHROMAX) 500 mg in 0.9% NaCl 250 mL IVPB (admixture device)         -- IV Every 24 hours (non-standard times) 12/12/24 0800            Microbiology Results (last 7 days)       Procedure Component Value Units Date/Time    Blood culture #1 **CANNOT BE ORDERED STAT** [[id number]] Collected: 12/12/24 0828    Order Status: Sent Specimen: Blood from Wrist, Right     Blood culture #2 **CANNOT BE ORDERED  STAT** [[id number]] Collected: 12/12/24 0828    Order Status: Sent Specimen: Blood from Peripheral, Antecubital, Right     Culture, Respiratory with Gram Stain [[id number]]     Order Status: No result Specimen: Respiratory     Influenza A & B by Molecular [[id number]] Collected: 12/12/24 0633    Order Status: Completed Specimen: Nasopharyngeal Swab Updated: 12/12/24 0734     Influenza A, Molecular Negative     Influenza B, Molecular Negative     Flu A & B Source Nasal swab            Dizziness  -Followed by Dr. Christy (Neurology) outpatient  -Daughter reports they have a follow up appt with Dr. Christy next year and discussed getting MRI due to continued dizziness  -She reports dizziness has not worsened but has not improved  -Will obtain MRI brain while being treated for PNA  -Obtain orthostatics  -TTE pending      Debility  Patient with Acute on chronic debility due to age-related physical debility. The patient's latest AMPAC (Activity Measure for Post Acute Care) Score is listed below.    AM-PAC Score - How much help does the patient need for each activity listed       Plan  - PT/OT consulted  - Fall precautions in place          Atrial fibrillation  Patient has paroxysmal (<7 days) atrial fibrillation. Patient is currently in atrial fibrillation. [id number] Score: 5. The patients heart rate in the last 24 hours is as follows:  Pulse  Min: 47  Max: 61     Antiarrhythmics  metoprolol succinate (TOPROL-XL) 24 hr tablet 100 mg, Daily, Oral    Anticoagulants  apixaban tablet 2.5 mg, 2 times daily, Oral    Plan  - Replete lytes with a goal of K>4, Mg >2  - Patient is anticoagulated, see medications listed above.  - Patient's afib is currently controlled        Visual hallucinations  -Cognitive dysfunction with visual hallucinations  -Followed by Dr. Christy  -Continue Memantine 10 mg po bid      HFrEF (heart failure with reduced ejection fraction)  Patient has Diastolic (HFpEF) heart failure that is Chronic. On presentation  their CHF was well compensated. Most recent BNP and echo results are listed below.  Recent Labs     12/12/24  0655   *     Latest ECHO  No results found for this or any previous visit.    Current Heart Failure Medications  metoprolol succinate (TOPROL-XL) 24 hr tablet 100 mg, Daily, Oral    Plan  - Monitor strict I&Os and daily weights.    - Place on telemetry  - Low sodium diet   - Cardiology has not been consulted  - The patient's volume status is at their baseline           Essential hypertension  Patient's blood pressure range in the last 24 hours was: BP  Min: 133/61  Max: 190/63.The patient's inpatient anti-hypertensive regimen is listed below:  Current Antihypertensives  metoprolol succinate (TOPROL-XL) 24 hr tablet 100 mg, Daily, Oral    Plan  - BP is controlled, no changes needed to their regimen  - Hydralazine PRN    Type 2 diabetes mellitus with hyperlipidemia  Hemoglobin A1C   Date Value Ref Range Status   08/08/2024 6.2 (H) 4.0 - 5.6 % Final     Comment:     ADA Screening Guidelines:  5.7-6.4%  Consistent with prediabetes  >or=6.5%  Consistent with diabetes    High levels of fetal hemoglobin interfere with the HbA1C  assay. Heterozygous hemoglobin variants (HbS, HgC, etc)do  not significantly interfere with this assay.   However, presence of multiple variants may affect accuracy.     05/03/2024 5.4 4.6 - 6.2 % Final   05/04/2023 6.4 (H) 4.0 - 5.6 % Final     Comment:     ADA Screening Guidelines:  5.7-6.4%  Consistent with prediabetes  >or=6.5%  Consistent with diabetes    High levels of fetal hemoglobin interfere with the HbA1C  assay. Heterozygous hemoglobin variants (HbS, HgC, etc)do  not significantly interfere with this assay.   However, presence of multiple variants may affect accuracy.     11/02/2022 6.4 (H) 4.0 - 5.6 % Final     Comment:     ADA Screening Guidelines:  5.7-6.4%  Consistent with prediabetes  >or=6.5%  Consistent with diabetes    High levels of fetal hemoglobin interfere  with the HbA1C  assay. Heterozygous hemoglobin variants (HbS, HgC, etc)do  not significantly interfere with this assay.   However, presence of multiple variants may affect accuracy.        -Accuchecks and low dose SSI        VTE Risk Mitigation (From admission, onward)           Ordered     apixaban tablet 2.5 mg  2 times daily         12/12/24 0946     IP VTE HIGH RISK PATIENT  Once         12/12/24 0946     Place sequential compression device  Until discontinued         12/12/24 0946                    Discharge Planning   ROCIO:      Code Status: Full Code   Medical Readiness for Discharge Date:   Discharge Plan A: Rehab                Please place Justification for DME        Kyaw Miguel MD  Department of Hospital Medicine   '[employer] - Telemetry ([facility])

## 2024-12-15 NOTE — PT/OT/SLP PROGRESS
Physical Therapy Treatment    Patient Name:  Alon Chatman   MRN:  [id number]    Recommendations:     Discharge Recommendations: High Intensity Therapy  Discharge Equipment Recommendations: to be determined by next level of care  Barriers to discharge: None    Assessment:     Alon Chatman is a 84 y.o. female admitted with a medical diagnosis of Community acquired pneumonia.  She presents with the following impairments/functional limitations: weakness, impaired endurance, impaired functional mobility, gait instability, impaired balance, pain, decreased safety awareness, decreased lower extremity function, decreased upper extremity function, decreased coordination.    Rehab Prognosis: Fair; patient would benefit from acute skilled PT services to address these deficits and reach maximum level of function.    Recent Surgery: * No surgery found *     Plan:     During this hospitalization, patient to be seen 3 x/week to address the identified rehab impairments via gait training, therapeutic activities, therapeutic exercises and progress toward the following goals:    Plan of Care Expires:  12/26/24    Subjective     Chief Complaint: RETURN TO ROOM TO CHECK ON PT SEATED IN CHAIR, PT REPORTS READY TO RETURN TO BED, C/O NAUSEA-IMPROVED AFTER RESTING IN BED   **PT TOLERATED APPROX 45 MINUTES IN BEDSIDE CHAIR  Patient/Family Comments/goals: WANT TO GET STRONGER  Pain/Comfort:  Pain Rating 1: 3/10  Location 1: abdomen (REPORTS SHE NEEDS TO HAVE BM)  Pain Addressed 1: Reposition (ACTIVITY PACING)      Objective:     Communicated with NURSE FAJARDO prior to session.  Patient found up in chair with telemetry, peripheral IV upon PT entry to room.     General Precautions: Standard, fall, hearing impaired (LOW BP, CHECK VITALS)  Orthopedic Precautions: N/A  Braces: N/A  Respiratory Status: Room air     Functional Mobility:  Bed Mobility:   REQUIRED CONSTANTINO FOR BED MOBILITY DUE TO FATIGUE FROM PROLONGED SITTING IN CHAIR  Rolling  "Left:  contact guard assistance  Scooting: contact guard assistance-SEATED FWD/BWD SCOOTING  Sit to Supine: minimum assistance  Transfers:     Sit to Stand:  minimum assistance with rolling walker  Chair to bed: minimum assistance with  rolling walker  using  Step Transfer  Balance: fair sitting balance, poor+ dynamic balance during TF    AM-PAC 6 CLICK MOBILITY  Turning over in bed (including adjusting bedclothes, sheets and blankets)?: 3  Sitting down on and standing up from a chair with arms (e.g., wheelchair, bedside commode, etc.): 3  Moving from lying on back to sitting on the side of the bed?: 3  Moving to and from a bed to a chair (including a wheelchair)?: 3  Need to walk in hospital room?: 3  Climbing 3-5 steps with a railing?: 1  Basic Mobility Total Score: 16     Treatment & Education:  PT EDUCATION:  - ROLE OF P.T. AND POC IN ACUTE CARE HOSPITAL SETTING  - RW USE AND SAFETY DURING TF'S AND GAIT  - ENCOURAGED TO INCREASE TIME OOB IN CHAIR TO TOLERANCE   - TO CONTINUE THERAPUETIC EXERCISES THROUGHOUT THE DAY TO INCREASE ACTIVITY TOLERANCE AND DECREASE RISK FOR PNEUMONIA AND BLOOD CLOTS: HIP FLEX/EXT, HIP ABD/ADD, QUAD SET, HEEL SLIDE, AP  - RISK FOR FALLS DUE TO GENERALIZED WEAKNESS, EDUCATED ON "CALL DON'T FALL", ENCOURAGED TO CALL FOR ASSISTANCE WITH ALL NEEDS SUCH AS BED<>CHAIR TRANSFERS OR TRIPS TO BATHROOM, PT AGREEABLE TO SAFETY PRECAUTIONS    Patient left HOB elevated with all lines intact, call button in reach, and NURSE notified..    GOALS:   Multidisciplinary Problems       Physical Therapy Goals          Problem: Physical Therapy    Goal Priority Disciplines Outcome Interventions   Physical Therapy Goal     PT, PT/OT Progressing    Description: LTG'S TO BE MET IN 14 DAYS (12-26-24)  PT WILL REQUIRE CONSTANTINO FOR BED MOBILITY  PT WILL REQUIRE CONSTANTINO FOR BED<>CHAIR TF'S  PT WILL  FEET WITH RW AND CONSTANTINO  PT WILL INC AMPAC SCORE BY 2 POINTS TO PROGRESS GROSS FUNC MOBILITY                   "       Time Tracking:     PT Received On: 12/15/24  PT Start Time: 1110     PT Stop Time: 1120  PT Total Time (min): 10 min     Billable Minutes: Therapeutic Activity 10    Treatment Type: Treatment  PT/PTA: PT     Number of PTA visits since last PT visit: 0     12/15/2024

## 2024-12-15 NOTE — PLAN OF CARE
A209/A209 NAS Chatman is a 84 y.o.female admitted on 12/12/2024 for Community acquired pneumonia   Code Status: Full Code MRN: [id number]   Review of patient's allergies indicates:   Allergen Reactions    Codeine      Other reaction(s): Unknown  unknown    Diclofenac Nausea And Vomiting    Doxycycline      Other reaction(s): Unknown  unknown    Iodinated contrast media Hives     Pt reports rxn to iv dye years ago,but no rxn to oral contrast    Sulfa (sulfonamide antibiotics)      Other reaction(s): Unknown  unknown     Past Medical History:   Diagnosis Date    Advance care planning 8/2/2024    Allergy     Anxiety     Asthma     Atrial fibrillation 6/10/2024    Coronary artery disease 10/18/2018    DM (diabetes mellitus)     Essential hypertension 10/21/2020    IBS (irritable bowel syndrome)     Migraine headache     Mitral valve prolapse     Type 2 diabetes mellitus with hyperlipidemia 9/6/2016      PRN meds    acetaminophen, 650 mg, Q8H PRN  dextrose 10%, 12.5 g, PRN  dextrose 10%, 25 g, PRN  glucagon (human recombinant), 1 mg, PRN  glucose, 16 g, PRN  glucose, 24 g, PRN  hydrALAZINE, 10 mg, Q6H PRN  insulin aspart U-100, 0-5 Units, QID (AC + HS) PRN  melatonin, 3 mg, Nightly PRN  ondansetron, 4 mg, Q8H PRN  sodium chloride 0.9%, 10 mL, PRN      Chart check completed. Will continue plan of care.         [place] Coma Scale Score: 15     Lead Monitored: Lead II Rhythm: normal sinus rhythm    Cardiac/Telemetry Box Number: [id number]  VTE Core Measure: Pharmacological prophylaxis initiated/maintained Last Bowel Movement: 12/14/24  Diet Cardiac Fluid - 1500mL; Standard Tray  Voiding Characteristics: voids spontaneously without difficulty  Alcides Score: 14  Fall Risk Score: 19  Accucheck []   Freq?      Lines/Drains/Airways       Peripheral Intravenous Line  Duration                  Peripheral IV - Single Lumen 12/12/24 0645 22 G Left Antecubital 2 days         Peripheral IV - Single Lumen 12/12/24 0837 20 G Right  Antecubital 2 days

## 2024-12-15 NOTE — PROGRESS NOTES
Primary Care Telemedicine Note  The patient location is:  Patient Home   The chief complaint leading to consultation is: cough  Total time spent with patient: 20 min    Visit type: Virtual visit with synchronous audio and video  Each patient to whom he or she provides medical services by telemedicine is:  (1) informed of the relationship between the physician and patient and the respective role of any other health care provider with respect to management of the patient; and (2) notified that he or she may decline to receive medical services by telemedicine and may withdraw from such care at any time.    Assessment & Plan  1. Cough.  She has been experiencing a deep cough for the past 5 days, accompanied by dark mucus and shortness of breath. Despite a clear chest x-ray performed by [facility] yesterday, her symptoms are worsening. Prescriptions for Augmentin, a steroid, albuterol, and Tessalon have been provided. Albuterol should be administered every 4-6 hours, with a couple of puffs each time, to alleviate coughing, wheezing, and shortness of breath. Home health will be contacted to arrange a visit to reassess her condition and ensure her vital signs and oxygen levels are within normal limits. If her condition deteriorates, immediate evaluation at the emergency room is recommended.    2. Diarrhea.  She has been experiencing loose bowels for the past 3 days. No red flags present, including fever or blood in stool. Over-the-counter Imodium can be used to manage her diarrhea. It is important to ensure she stays well-hydrated to prevent dehydration.       Follow up if symptoms worsen or fail to improve.    Padmini Boucher MD  _____________________________________________________________________________________________________________________________________________________    HPI:    Patient is an established patient who presents today via virtual visit.    History of Present Illness  The patient is an 84-year-old  female who is presenting virtually to discuss a cough. She is accompanied by her daughter.    She has been experiencing a deep cough for the past 5 days, which has been progressively worsening. She has been able to expectorate some dark mucus and has been experiencing shortness of breath. Despite these symptoms, she reports no fever or nasal congestion. Her sleep has been disrupted due to the cough, but melatonin has been somewhat helpful. She has been taking Mucinex to manage her symptoms. Her oxygen level was checked by a physical therapist on Tuesday and was stable. Home health also obtained a portable CXR which was normal.     Additionally, she has been experiencing loose bowel movements for the past 3 days, but this symptom has shown some improvement. She denies fever. Denies blood in stool. Denies multiple BM's during the day. Denies nocturnal BM's.      No other new complaints today.      Past Medical History:  Past Medical History:   Diagnosis Date    Advance care planning 8/2/2024    Allergy     Anxiety     Asthma     Atrial fibrillation 6/10/2024    Coronary artery disease 10/18/2018    DM (diabetes mellitus)     Essential hypertension 10/21/2020    IBS (irritable bowel syndrome)     Migraine headache     Mitral valve prolapse     Type 2 diabetes mellitus with hyperlipidemia 9/6/2016       No current facility-administered medications on file prior to visit.     Current Outpatient Medications on File Prior to Visit   Medication Sig Dispense Refill    apixaban (ELIQUIS) 2.5 mg Tab Take 2.5 mg by mouth 2 (two) times daily.      aspirin 81 MG Chew Take 81 mg by mouth once daily.      blood sugar diagnostic (CONTOUR TEST STRIPS) Strp TEST BLOOD GLUCOSE ONCE DAILY 100 each 11    cyanocobalamin (VITAMIN B-12) 500 MCG tablet Take 500 mcg by mouth once daily.      ferrous gluconate (FERGON) 324 MG tablet Take 324 mg by mouth daily with breakfast.      FLUoxetine 20 MG capsule Take 1 capsule (20 mg total) by mouth  once daily. 30 capsule 11    folic acid (FOLVITE) 1 MG tablet Take 1 mg by mouth once daily.      furosemide (LASIX) 20 MG tablet Take 20 mg by mouth as needed.      lancets (MICROLET LANCET) Misc TEST BLOOD GLUCOSE ONCE DAILY 100 each 3    memantine (NAMENDA) 10 MG Tab Take 10 mg by mouth 2 (two) times daily.      metFORMIN (GLUCOPHAGE) 500 MG tablet Take 1 tablet (500 mg total) by mouth 2 (two) times daily. 180 tablet 3    metoprolol succinate (TOPROL-XL) 100 MG 24 hr tablet Take 1 tablet (100 mg total) by mouth once daily. 90 tablet 3    senna-docusate 8.6-50 mg (SENNA WITH DOCUSATE SODIUM) 8.6-50 mg per tablet Take 2 tablets by mouth 2 (two) times a day.      tamsulosin (FLOMAX) 0.4 mg Cap Take 1 capsule by mouth once daily.         Review of Systems   Constitutional:  Positive for chills and weight loss. Negative for fever and malaise/fatigue.   HENT:  Negative for ear pain and sore throat.    Respiratory:  Positive for cough, shortness of breath and wheezing. Negative for hemoptysis.    Cardiovascular:  Negative for chest pain, palpitations and leg swelling.   Gastrointestinal:  Negative for abdominal pain, constipation, diarrhea and heartburn.   Genitourinary:  Negative for dysuria and frequency.   Musculoskeletal:  Negative for back pain, joint pain and myalgias.   Skin:  Negative for rash.   Neurological:  Negative for headaches.   Endo/Heme/Allergies:  Positive for environmental allergies.   Psychiatric/Behavioral:  Negative for depression. The patient is not nervous/anxious.          Physical Exam   There were no vitals filed for this visit.   .FLOWAMB[14   BMI Readings from Last 1 Encounters:   12/15/24 16.54 kg/m²       Physical Exam  Constitutional:       General: She is not in acute distress.     Appearance: She is well-developed. She is ill-appearing.   HENT:      Head: Normocephalic and atraumatic.   Pulmonary:      Effort: Pulmonary effort is normal. No respiratory distress.   Abdominal:       General: There is no distension.   Musculoskeletal:         General: Normal range of motion.      Cervical back: Normal range of motion.   Neurological:      Mental Status: She is alert and oriented to person, place, and time.   Psychiatric:         Mood and Affect: Mood normal.         Behavior: Behavior normal.           DISCLAIMER: This note was compiled by using a speech recognition dictation system and therefore please be aware that typographical / speech recognition errors can and do occur.  Please contact me if you see any errors specifically.  Consent was obtained for [employer] recording system prior to the visit.  Answers submitted by the patient for this visit:  Cough Questionnaire (Submitted on 12/4/2024)  Chief Complaint: Cough  Chronicity: recurrent  Onset: in the past 7 days  Progression since onset: gradually worsening  Frequency: every few minutes  Cough characteristics: productive of brown sputum  ear congestion: No  nasal congestion: No  postnasal drip: Yes  rhinorrhea: Yes  sweats: No  Aggravated by: nothing  asthma: Yes  bronchiectasis: No  bronchitis: Yes  COPD: No  emphysema: No  pneumonia: Yes  Treatments tried: OTC cough suppressant, body position changes, rest  Improvement on treatment: mild

## 2024-12-15 NOTE — PT/OT/SLP PROGRESS
Physical Therapy Treatment    Patient Name:  Alon Chatman   MRN:  [id number]    Recommendations:     Discharge Recommendations: High Intensity Therapy  Discharge Equipment Recommendations: to be determined by next level of care  Barriers to discharge: Decreased caregiver support    Assessment:     Alon Chatman is a 84 y.o. female admitted with a medical diagnosis of Community acquired pneumonia.  She presents with the following impairments/functional limitations: weakness, impaired endurance, impaired functional mobility, gait instability, impaired balance, pain, decreased safety awareness, decreased lower extremity function, decreased upper extremity function, decreased coordination.    Rehab Prognosis: Fair; patient would benefit from acute skilled PT services to address these deficits and reach maximum level of function.    Recent Surgery: * No surgery found *     Plan:     During this hospitalization, patient to be seen 3 x/week to address the identified rehab impairments via gait training, therapeutic activities, therapeutic exercises and progress toward the following goals:    Plan of Care Expires:  12/26/24    Subjective     Chief Complaint: REPORTS INCREASED NAUSEA WITH ACTIVITY-NOTIFIED NURSE  Patient/Family Comments/goals: READY TO PARTICIPATE, WANTS TO GET STRONGER  Pain/Comfort:  Pain Rating 1: 3/10  Location 1: abdomen (REPORTS SHE NEEDS TO HAVE BM)  Pain Addressed 1: Reposition (ACTIVITY PACING)      Objective:     Communicated with NURSE FAJARDO prior to session.  Patient found HOB elevated with telemetry, peripheral IV upon PT entry to room.     General Precautions: Standard, fall, hearing impaired (LOW BP, CHECK VITALS)  Orthopedic Precautions: N/A  Braces: N/A  Respiratory Status: Room air     Functional Mobility:  Bed Mobility:   INCREASED TIME BUT GOOD PARTICIPATE  Rolling Right: stand by assistance  Scooting: stand by assistance-SEATED SCOOT, 1 SMALL LOB POSTERIORLY, CONSTANTINO TO  "RECOVER  Supine to Sit: stand by assistance  Transfers:     Sit to Stand:  minimum assistance with rolling walker  Stand to sit: Pankaj with rw  Bed to Chair: minimum assistance with  rolling walker  using  Step Transfer  Gait: PT AMB 5' WITH RW AND PANKAJ, NARROW RICHARD, SHORT SHUFFLING STEPS, QUICK TO FATIGUE, REPORTS MILD DIZZINESS, C/O NAUSEA, CHAIR IN TOW FOR SAFETY, CUES FOR UPRIGHT POSTURE AND RW SAFETY  Balance: FAIR+ SITTING BALANCE, POOR  PT TOLERATED UN-ASSISTED SITTING AT EOB FOR APPROX 10 MINUTES TO ASSESS BP-REPORTS OF DIZZINESS AND NAUSEA THAT DID IMPROVE OVER TIME  BP IN SUPINE: 114/58  BP SITTING EOB: 107/55, 108/54  BP SITTING IN BEDSIDE CHAIR AFTER GAIT TRIAL: 91/53, 93/53, 96/50  PT EDUCATED IN AND PERFORMED SEATED BLE 2 SETS OF 10 REPS WITH REST AS NEEDED: HIP FLEX/EXT, LAQ, QUAD SET, AP'S, HEEL SLIDES  PT SET UP TO BRUSH TEETH AND RINSE MOUTH WHILE SEATED IN CHAIR-GOOD PARTICIPATION    AM-PAC 6 CLICK MOBILITY  Turning over in bed (including adjusting bedclothes, sheets and blankets)?: 4  Sitting down on and standing up from a chair with arms (e.g., wheelchair, bedside commode, etc.): 3  Moving from lying on back to sitting on the side of the bed?: 4  Moving to and from a bed to a chair (including a wheelchair)?: 3  Need to walk in hospital room?: 3  Climbing 3-5 steps with a railing?: 1  Basic Mobility Total Score: 18     Treatment & Education:  PT EDUCATION:  - ROLE OF P.T. AND POC IN ACUTE CARE HOSPITAL SETTING  - RW USE AND SAFETY DURING TF'S AND GAIT  - ENCOURAGED TO INCREASE TIME OOB IN CHAIR TO TOLERANCE   - TO CONTINUE THERAPUETIC EXERCISES THROUGHOUT THE DAY TO INCREASE ACTIVITY TOLERANCE AND DECREASE RISK FOR PNEUMONIA AND BLOOD CLOTS: HIP FLEX/EXT, HIP ABD/ADD, QUAD SET, HEEL SLIDE, AP  - RISK FOR FALLS DUE TO GENERALIZED WEAKNESS, EDUCATED ON "CALL DON'T FALL", ENCOURAGED TO CALL FOR ASSISTANCE WITH ALL NEEDS SUCH AS BED<>CHAIR TRANSFERS OR TRIPS TO BATHROOM, PT AGREEABLE TO SAFETY " PRECAUTIONS    Patient left up in chair with all lines intact, call button in reach, chair alarm on, and NURSE notified..    GOALS:   Multidisciplinary Problems       Physical Therapy Goals          Problem: Physical Therapy    Goal Priority Disciplines Outcome Interventions   Physical Therapy Goal     PT, PT/OT Progressing    Description: LTG'S TO BE MET IN 14 DAYS (12-26-24)  PT WILL REQUIRE CONSTANTINO FOR BED MOBILITY  PT WILL REQUIRE CONSTANTINO FOR BED<>CHAIR TF'S  PT WILL  FEET WITH RW AND CONSTANTINO  PT WILL INC AMPAC SCORE BY 2 POINTS TO PROGRESS GROSS FUNC MOBILITY                         Time Tracking:     PT Received On: 12/15/24  PT Start Time: 1010     PT Stop Time: 1040  PT Total Time (min): 30 min     Billable Minutes: Gait Training 10 and Therapeutic Activity 20    Treatment Type: Treatment  PT/PTA: PT     Number of PTA visits since last PT visit: 0     12/15/2024

## 2024-12-15 NOTE — PLAN OF CARE
A209/A209 NAS Chatman is a 84 y.o.female admitted on 12/12/2024 for Community acquired pneumonia   Code Status: Full Code MRN: [id number]   Review of patient's allergies indicates:   Allergen Reactions    Codeine      Other reaction(s): Unknown  unknown    Diclofenac Nausea And Vomiting    Doxycycline      Other reaction(s): Unknown  unknown    Iodinated contrast media Hives     Pt reports rxn to iv dye years ago,but no rxn to oral contrast    Sulfa (sulfonamide antibiotics)      Other reaction(s): Unknown  unknown     Past Medical History:   Diagnosis Date    Advance care planning 8/2/2024    Allergy     Anxiety     Asthma     Atrial fibrillation 6/10/2024    Coronary artery disease 10/18/2018    DM (diabetes mellitus)     Essential hypertension 10/21/2020    IBS (irritable bowel syndrome)     Migraine headache     Mitral valve prolapse     Type 2 diabetes mellitus with hyperlipidemia 9/6/2016      PRN meds    acetaminophen, 650 mg, Q8H PRN  dextrose 10%, 12.5 g, PRN  dextrose 10%, 25 g, PRN  glucagon (human recombinant), 1 mg, PRN  glucose, 16 g, PRN  glucose, 24 g, PRN  hydrALAZINE, 10 mg, Q6H PRN  insulin aspart U-100, 0-5 Units, QID (AC + HS) PRN  melatonin, 3 mg, Nightly PRN  ondansetron, 4 mg, Q8H PRN  sodium chloride 0.9%, 10 mL, PRN      Chart check completed. Will continue plan of care.         Hessmer Coma Scale Score: 15     Lead Monitored: Lead II Rhythm: normal sinus rhythm    Cardiac/Telemetry Box Number: [id number]  VTE Core Measure: Pharmacological prophylaxis initiated/maintained Last Bowel Movement: 12/15/24  Diet Cardiac Fluid - 1500mL; Standard Tray  Voiding Characteristics: incontinence  Alcides Score: 14  Fall Risk Score: 19  Accucheck []   Freq?      Lines/Drains/Airways       Peripheral Intravenous Line  Duration                  Peripheral IV - Single Lumen 12/12/24 0837 20 G Right Antecubital 3 days

## 2024-12-15 NOTE — PLAN OF CARE
A209/A209 NAS Chatman is a 84 y.o.female admitted on 12/12/2024 for Community acquired pneumonia   Code Status: Full Code MRN: [id number]   Review of patient's allergies indicates:   Allergen Reactions    Codeine      Other reaction(s): Unknown  unknown    Diclofenac Nausea And Vomiting    Doxycycline      Other reaction(s): Unknown  unknown    Iodinated contrast media Hives     Pt reports rxn to iv dye years ago,but no rxn to oral contrast    Sulfa (sulfonamide antibiotics)      Other reaction(s): Unknown  unknown     Past Medical History:   Diagnosis Date    Advance care planning 8/2/2024    Allergy     Anxiety     Asthma     Atrial fibrillation 6/10/2024    Coronary artery disease 10/18/2018    DM (diabetes mellitus)     Essential hypertension 10/21/2020    IBS (irritable bowel syndrome)     Migraine headache     Mitral valve prolapse     Type 2 diabetes mellitus with hyperlipidemia 9/6/2016      PRN meds    acetaminophen, 650 mg, Q8H PRN  dextrose 10%, 12.5 g, PRN  dextrose 10%, 25 g, PRN  glucagon (human recombinant), 1 mg, PRN  glucose, 16 g, PRN  glucose, 24 g, PRN  hydrALAZINE, 10 mg, Q6H PRN  insulin aspart U-100, 0-5 Units, QID (AC + HS) PRN  ondansetron, 4 mg, Q8H PRN  sodium chloride 0.9%, 10 mL, PRN      Chart check completed. Will continue plan of care.         Denise Coma Scale Score: 15     Lead Monitored: Lead II Rhythm: normal sinus rhythm    Cardiac/Telemetry Box Number: [id number]  VTE Core Measure: Pharmacological prophylaxis initiated/maintained Last Bowel Movement: 12/14/24  Diet Cardiac Fluid - 1500mL; Standard Tray  Voiding Characteristics: voids spontaneously without difficulty  Alcides Score: 14  Fall Risk Score: 22  Accucheck [x]   Freq?      Lines/Drains/Airways       Peripheral Intravenous Line  Duration                  Peripheral IV - Single Lumen 12/12/24 0645 22 G Left Antecubital 2 days         Peripheral IV - Single Lumen 12/12/24 0837 20 G Right Antecubital 2 days

## 2024-12-16 VITALS
HEART RATE: 74 BPM | WEIGHT: 105.63 LBS | BODY MASS INDEX: 16.58 KG/M2 | SYSTOLIC BLOOD PRESSURE: 173 MMHG | DIASTOLIC BLOOD PRESSURE: 72 MMHG | OXYGEN SATURATION: 97 % | TEMPERATURE: 98 F | HEIGHT: 67 IN | RESPIRATION RATE: 20 BRPM

## 2024-12-16 LAB
ANION GAP SERPL CALC-SCNC: 9 MMOL/L (ref 8–16)
BASOPHILS # BLD AUTO: 0.02 K/UL (ref 0–0.2)
BASOPHILS NFR BLD: 0.1 % (ref 0–1.9)
BUN SERPL-MCNC: 15 MG/DL (ref 8–23)
CALCIUM SERPL-MCNC: 8.9 MG/DL (ref 8.7–10.5)
CHLORIDE SERPL-SCNC: 102 MMOL/L (ref 95–110)
CO2 SERPL-SCNC: 27 MMOL/L (ref 23–29)
CREAT SERPL-MCNC: 0.9 MG/DL (ref 0.5–1.4)
DIFFERENTIAL METHOD BLD: ABNORMAL
EOSINOPHIL # BLD AUTO: 0.1 K/UL (ref 0–0.5)
EOSINOPHIL NFR BLD: 0.7 % (ref 0–8)
ERYTHROCYTE [DISTWIDTH] IN BLOOD BY AUTOMATED COUNT: 12.7 % (ref 11.5–14.5)
EST. GFR  (NO RACE VARIABLE): >60 ML/MIN/1.73 M^2
GLUCOSE SERPL-MCNC: 113 MG/DL (ref 70–110)
HCT VFR BLD AUTO: 36.8 % (ref 37–48.5)
HGB BLD-MCNC: 11.7 G/DL (ref 12–16)
IMM GRANULOCYTES # BLD AUTO: 0.09 K/UL (ref 0–0.04)
IMM GRANULOCYTES NFR BLD AUTO: 0.6 % (ref 0–0.5)
LYMPHOCYTES # BLD AUTO: 1.3 K/UL (ref 1–4.8)
LYMPHOCYTES NFR BLD: 9.4 % (ref 18–48)
MCH RBC QN AUTO: 29.5 PG (ref 27–31)
MCHC RBC AUTO-ENTMCNC: 31.8 G/DL (ref 32–36)
MCV RBC AUTO: 93 FL (ref 82–98)
MONOCYTES # BLD AUTO: 1.4 K/UL (ref 0.3–1)
MONOCYTES NFR BLD: 9.7 % (ref 4–15)
NEUTROPHILS # BLD AUTO: 11.2 K/UL (ref 1.8–7.7)
NEUTROPHILS NFR BLD: 79.5 % (ref 38–73)
NRBC BLD-RTO: 0 /100 WBC
PLATELET # BLD AUTO: 157 K/UL (ref 150–450)
PMV BLD AUTO: 11.4 FL (ref 9.2–12.9)
POCT GLUCOSE: 132 MG/DL (ref 70–110)
POCT GLUCOSE: 160 MG/DL (ref 70–110)
POTASSIUM SERPL-SCNC: 4.8 MMOL/L (ref 3.5–5.1)
RBC # BLD AUTO: 3.97 M/UL (ref 4–5.4)
SODIUM SERPL-SCNC: 138 MMOL/L (ref 136–145)
WBC # BLD AUTO: 14.07 K/UL (ref 3.9–12.7)

## 2024-12-16 PROCEDURE — 97535 SELF CARE MNGMENT TRAINING: CPT

## 2024-12-16 PROCEDURE — 97116 GAIT TRAINING THERAPY: CPT

## 2024-12-16 PROCEDURE — 25000242 PHARM REV CODE 250 ALT 637 W/ HCPCS: Performed by: STUDENT IN AN ORGANIZED HEALTH CARE EDUCATION/TRAINING PROGRAM

## 2024-12-16 PROCEDURE — 80048 BASIC METABOLIC PNL TOTAL CA: CPT | Performed by: STUDENT IN AN ORGANIZED HEALTH CARE EDUCATION/TRAINING PROGRAM

## 2024-12-16 PROCEDURE — 63600175 PHARM REV CODE 636 W HCPCS: Performed by: NURSE PRACTITIONER

## 2024-12-16 PROCEDURE — 94640 AIRWAY INHALATION TREATMENT: CPT

## 2024-12-16 PROCEDURE — 97530 THERAPEUTIC ACTIVITIES: CPT

## 2024-12-16 PROCEDURE — 63700000 PHARM REV CODE 250 ALT 637 W/O HCPCS: Performed by: STUDENT IN AN ORGANIZED HEALTH CARE EDUCATION/TRAINING PROGRAM

## 2024-12-16 PROCEDURE — 94761 N-INVAS EAR/PLS OXIMETRY MLT: CPT

## 2024-12-16 PROCEDURE — 85025 COMPLETE CBC W/AUTO DIFF WBC: CPT | Performed by: STUDENT IN AN ORGANIZED HEALTH CARE EDUCATION/TRAINING PROGRAM

## 2024-12-16 PROCEDURE — 36415 COLL VENOUS BLD VENIPUNCTURE: CPT | Performed by: STUDENT IN AN ORGANIZED HEALTH CARE EDUCATION/TRAINING PROGRAM

## 2024-12-16 PROCEDURE — 25000003 PHARM REV CODE 250: Performed by: NURSE PRACTITIONER

## 2024-12-16 RX ORDER — LEVOFLOXACIN 500 MG/1
500 TABLET, FILM COATED ORAL DAILY
Qty: 5 TABLET | Refills: 0 | Status: SHIPPED | OUTPATIENT
Start: 2024-12-16 | End: 2024-12-21

## 2024-12-16 RX ADMIN — FOLIC ACID 1 MG: 1 TABLET ORAL at 08:12

## 2024-12-16 RX ADMIN — IPRATROPIUM BROMIDE AND ALBUTEROL SULFATE 3 ML: 2.5; .5 SOLUTION RESPIRATORY (INHALATION) at 07:12

## 2024-12-16 RX ADMIN — CEFTRIAXONE 1 G: 1 INJECTION, POWDER, FOR SOLUTION INTRAMUSCULAR; INTRAVENOUS at 08:12

## 2024-12-16 RX ADMIN — APIXABAN 2.5 MG: 2.5 TABLET, FILM COATED ORAL at 08:12

## 2024-12-16 RX ADMIN — AZITHROMYCIN DIHYDRATE 250 MG: 250 TABLET ORAL at 08:12

## 2024-12-16 RX ADMIN — IPRATROPIUM BROMIDE AND ALBUTEROL SULFATE 3 ML: 2.5; .5 SOLUTION RESPIRATORY (INHALATION) at 02:12

## 2024-12-16 RX ADMIN — FLUOXETINE HYDROCHLORIDE 20 MG: 20 CAPSULE ORAL at 08:12

## 2024-12-16 RX ADMIN — MEMANTINE 10 MG: 10 TABLET ORAL at 08:12

## 2024-12-16 NOTE — PLAN OF CARE
[facility] - Telemetry (Hospital)  Discharge Final Note    Primary Care Provider: Padmini Boucher MD    Expected Discharge Date: 12/16/2024    Final Discharge Note (most recent)       Final Note - 12/16/24 1219          Final Note    Assessment Type Final Discharge Note (P)      Anticipated Discharge Disposition Rehab Facility (P)         Post-Acute Status    Post-Acute Authorization Placement (P)      Post-Acute Placement Status Set-up Complete/Auth obtained (P)    [facility]    Discharge Delays None known at this time (P)                      Important Message from Medicare             Contact Info       Padmini Boucher MD   Specialty: Internal Medicine   Relationship: PCP - General    [address]  [address]  [facility] [address]   Phone: [phone]       Next Steps: Schedule an appointment as soon as possible for a visit in 3 day(s)    Bell Brown PA-C   Specialty: Pulmonary Disease    [id number] [place] [address]   Phone: [phone]       Next Steps: Schedule an appointment as soon as possible for a visit    Instructions: followup pneumonia

## 2024-12-16 NOTE — NURSING
Patient being discharged. IV removed. Vitals taken and documented. Daughter at bedside and patient transportation ready to  patient for discharge.

## 2024-12-16 NOTE — PLAN OF CARE
Message sent to Bronwyn with [facility] to verify if has a bed today.    Daughter notified CM that she wants back up facility to be [facility] instead of [facility].

## 2024-12-16 NOTE — PLAN OF CARE
12/16/24 0932   Post-Acute Status   Post-Acute Authorization Placement   Post-Acute Placement Status Set-up Complete/Auth obtained   Discharge Delays None known at this time   Discharge Plan   Discharge Plan A Rehab     [facility] has a bed for patient today.  Advised Dr. Varela.  Obtaining discharge orders.  Notified patient and left message for daughter, Lety.    12:15pm Patient transferring to [facility] today.  Please call report to [phone].  Transportation is picking patient up at 5pm today.

## 2024-12-16 NOTE — CONSULTS
[facility] - Telemetry ([facility])  Wound Care    Patient Name:  Alon Chatman   MRN:  [id number]  Date: 12/16/2024  Diagnosis: Community acquired pneumonia    History:     Past Medical History:   Diagnosis Date    Advance care planning 8/2/2024    Allergy     Anxiety     Asthma     Atrial fibrillation 6/10/2024    Coronary artery disease 10/18/2018    DM (diabetes mellitus)     Essential hypertension 10/21/2020    IBS (irritable bowel syndrome)     Migraine headache     Mitral valve prolapse     Type 2 diabetes mellitus with hyperlipidemia 9/6/2016       Social History     Socioeconomic History    Marital status:    Tobacco Use    Smoking status: Never    Smokeless tobacco: Never   Substance and Sexual Activity    Alcohol use: No    Drug use: No    Sexual activity: Not Currently     Social Drivers of Health     Financial Resource Strain: Low Risk  (12/15/2024)    Overall Financial Resource Strain (CARDIA)     Difficulty of Paying Living Expenses: Not very hard   Food Insecurity: No Food Insecurity (12/15/2024)    Hunger Vital Sign     Worried About Running Out of Food in the Last Year: Never true     Ran Out of Food in the Last Year: Never true   Transportation Needs: No Transportation Needs (12/15/2024)    TRANSPORTATION NEEDS     Transportation : No   Physical Activity: Inactive (10/3/2024)    Exercise Vital Sign     Days of Exercise per Week: 0 days     Minutes of Exercise per Session: 0 min   Stress: No Stress Concern Present (12/15/2024)    [place] [employer] - Occupational Stress Questionnaire     Feeling of Stress : Only a little   Recent Concern: Stress - Stress Concern Present (10/3/2024)    [place] [employer] - Occupational Stress Questionnaire     Feeling of Stress : Rather much   Housing Stability: Low Risk  (12/15/2024)    Housing Stability Vital Sign     Unable to Pay for Housing in the Last Year: No     Homeless in the Last Year: No       Precautions:      Allergies as of 12/12/2024 - Reviewed 12/12/2024   Allergen Reaction Noted    Codeine  01/13/2012    Diclofenac Nausea And Vomiting 01/05/2017    Doxycycline  01/13/2012    Iodinated contrast media Hives 01/10/2017    Sulfa (sulfonamide antibiotics)  01/13/2012       WO Assessment Details/Treatment     Consulted on this 83 y/o F patient due to present on admission pressure injury to sacral region. Patient is awake and alert, denies pain. Skin assessed - bilateral heels intact with no redness or breakdown noted.  Turned to left side for sacral assessment. Noted to had had a small mushy BM, incontinence care provided with no rinse foaming cleanser and bath wipes. Perineum intact.  Stage 2 pressure injury noted to sacrum with scattered areas of partial thickness tissue loss noted, epithelial bridges between, entire area measures 3x4.5x0.1cm, blanchable redness surrounding. Cleansed with saline and patted dry. Painted intact princess wound skin with cavilon, then duoderm applied to cover area.  Versette applied after care provided. Patient then left with PCT for bathing care.    Recommendations made to primary team for ongoing pressure injury prevention interventions and wound care per orders.        12/16/24 0840   WOCN Assessment   WOCN Total Time (mins) 45   Visit Date 12/16/24   Visit Time 0840   Consult Type New   [facility] Speciality Wound   Wound pressure   Intervention assessed;applied;chart review;coordination of care;team conference;orders   Teaching on-going;complication        Wound 12/12/24 0700 Pressure Injury Sacral spine   Date First Assessed/Time First Assessed: 12/12/24 0700   Present on Original Admission: Yes  Primary Wound Type: Pressure Injury  Location: Sacral spine   Wound Image    Pressure Injury Stage 2   Dressing Appearance Intact   Drainage Amount Scant   Drainage Characteristics/Odor Serous   Appearance Pink;Moist;Epithelialization   Tissue loss description Partial thickness   Periwound Area  Intact;Redness   Wound Edges Irregular   Wound Length (cm) 3 cm   Wound Width (cm) 4.5 cm   Wound Depth (cm) 0.1 cm   Wound Volume (cm^3) 1.35 cm^3   Wound Surface Area (cm^2) 13.5 cm^2   Care Cleansed with:;Antimicrobial agent;Applied:;Skin Barrier   Dressing Hydrocolloid;Applied   Dressing Change Due 12/19/24 12/16/2024

## 2024-12-16 NOTE — DISCHARGE SUMMARY
[facility]  Discharge Summary      Patient Name: Alon Chatman  MRN: [id number]  [facility]: [id number]  Patient Class: IP- Inpatient  Admission Date: 12/12/2024  Hospital Length of Stay: 2 days  Discharge Date and Time:  12/16/2024 2:08 PM  Attending Physician: Christine Varela MD   Discharging Provider: LEDA Mitchell  Primary Care Provider: Padmini Boucher MD    Primary Care Team: Networked reference to record PCT     HPI:   Alon Chatman is an 84 year old female who has  has a past medical history of Advance care planning, Allergy, Anxiety, Asthma, Atrial fibrillation, Coronary artery disease, DM (diabetes mellitus), Essential hypertension, IBS (irritable bowel syndrome), Migraine headache, Mitral valve prolapse, Parkinson's, and Type 2 diabetes mellitus with hyperlipidemia who presented to the ED for mid back pain. Associated symptoms include cough, SOB, and generalized weakness. About one week ago diagnosed with bronchitis and given Augmentin x 10 days to complete. Patient's daughter at bedside states she had one more pill left to take today. Initially cough produced green sputum but now is clear but remains SOB. Denies fever/chills. Daughter also reports being followed by Dr. Christy (neurology) for dizziness. Next follow up appt is not until next year. She is suppose to have an MRI brain with the next visit and daughter is inquiring if it can be done while here. Dizziness has not worsened but remains. ED workup showed: WBC count 11.63K, Hgb/Hct 15.6/47.0, . CXR showed findings concerning for a left lower lobe pneumonia with parapneumonic effusion. Treatment for presumed pneumonia and follow-up x-rays in 4-6 weeks recommended to ensure resolution after adequate treatment. She received one time dose of Rocephin 1 gm IV in ED. Pt admitted to observation for PNA.    * No surgery found *      Hospital Course:   12/13/2024  Continues to do well from a pneumonia  standpoint. However, it was found that patient is significantly weak when examined by our therapists' teams and that the patient would require high intensity therapy. Referrals to SNF and Rehab placed while antibiotics are continued.    12/16  Patient admitted for continued respiratory symptoms and weakness after OP treatment for pneumonia. Inpatient treatment for the PNA initiated and was going well but patient found to have significant weakness by PT/OT evaluation and recommended she would require high intensity rehab therapy.  consulted and has been working on placement to a rehab facility. [facility] has a bed and has accepted the patient where she will be transferred.  Follow up with pulmonology OP for pneumonia. Patient's inpatient antibiotic Rocephin and azithromycin IV were changed to Levaquin 500 mg PO daily. Also follow-up with OP neurology for previous complaint of dizziness. I have seen and assessed this patient and determined she is stable for discharge.       Goals of Care Treatment Preferences:  Code Status: Full Code      SDOH Screening:  The patient was screened for utility difficulties, food insecurity, transport difficulties, housing insecurity, and interpersonal safety and there were no concerns identified this admission.     Consults:   Consults (From admission, onward)          Status Ordering Provider     Inpatient consult to Social Work  Once        Provider:  (Not yet assigned)    Completed KRISTIN REYNAGA            * Community acquired pneumonia  Patient has a diagnosis of pneumonia. The cause of the pneumonia is unknown at this time. The pneumonia is  currently being treated . The patient has the following signs/symptoms of pneumonia: cough, sputum production, and shortness of breath. The patient does not have a current oxygen requirement and the patient does not have a home oxygen requirement. I have reviewed the pertinent imaging. The following cultures have been  collected: Blood cultures and Sputum culture The culture results are listed below.     Current antimicrobial regimen consists of the antibiotics listed below. Will monitor patient closely and continue current treatment plan unchanged.    Antibiotics (From admission, onward)      Start     Stop Route Frequency Ordered    12/13/24 0900  cefTRIAXone injection 1 g         -- IV Every 24 hours (non-standard times) 12/12/24 0947    12/12/24 0915  azithromycin (ZITHROMAX) 500 mg in 0.9% NaCl 250 mL IVPB (admixture device)         -- IV Every 24 hours (non-standard times) 12/12/24 0800            Microbiology Results (last 7 days)       Procedure Component Value Units Date/Time    Blood culture #1 **CANNOT BE ORDERED STAT** [[id number]] Collected: 12/12/24 0828    Order Status: Sent Specimen: Blood from Wrist, Right     Blood culture #2 **CANNOT BE ORDERED STAT** [[id number]] Collected: 12/12/24 0828    Order Status: Sent Specimen: Blood from Peripheral, Antecubital, Right     Culture, Respiratory with Gram Stain [[id number]]     Order Status: No result Specimen: Respiratory     Influenza A & B by Molecular [[id number]] Collected: 12/12/24 0633    Order Status: Completed Specimen: Nasopharyngeal Swab Updated: 12/12/24 0734     Influenza A, Molecular Negative     Influenza B, Molecular Negative     Flu A & B Source Nasal swab            Dizziness  -Followed by Dr. Christy (Neurology) outpatient  -Daughter reports they have a follow up appt with Dr. Christy next year and discussed getting MRI due to continued dizziness  -She reports dizziness has not worsened but has not improved  -MRI brain deferred to OP  -Obtain orthostatics  -TTE results: L ventricular size normal with mildly increased wall thickness, systolic function normal. R ventricular size and wall thickness normal with normal systolic function.      Debility  Patient with Acute on chronic debility due to age-related physical debility. The patient's latest [facility] (Activity  Measure for Post Acute Care) Score is listed below.    AM-PAC Score - How much help does the patient need for each activity listed       Plan  - PT/OT consulted-recommend high intensity rehab treatment  - Fall precautions in place          Atrial fibrillation  Patient has paroxysmal (<7 days) atrial fibrillation. Patient is currently in atrial fibrillation. [id number] Score: 5. The patients heart rate in the last 24 hours is as follows:  Pulse  Min: 47  Max: 61          Antiarrythmic       metoprolol succinate (TOPROL-XL) 24 hr tablet 100 mg, Daily, Oral    Anticoagulants  apixaban tablet 2.5 mg, 2 times daily, Oral    Plan  - Replete lytes with a goal of K>4, Mg >2  - Patient is anticoagulated, see medications listed above.  - Patient's afib is currently controlled        Visual hallucinations  -Cognitive dysfunction with visual hallucinations  -Followed by Dr. Christy  -Continue Memantine 10 mg po bid      HFrEF (heart failure with reduced ejection fraction)  Patient has Diastolic (HFpEF) heart failure that is Chronic. On presentation their CHF was well compensated. Most recent BNP and echo results are listed below.  Recent Labs     12/12/24  0655   *     Latest ECHO  No results found for this or any previous visit.    Current Heart Failure Medications  metoprolol succinate (TOPROL-XL) 24 hr tablet 100 mg, Daily, Oral    Plan  - Monitor strict I&Os and daily weights.    - Place on telemetry  - Low sodium diet   - Cardiology has not been consulted  - The patient's volume status is at their baseline           Essential hypertension  Patient's blood pressure range in the last 24 hours was: BP  Min: 133/61  Max: 190/63.The patient's inpatient anti-hypertensive regimen is listed below:  Current Antihypertensives  metoprolol succinate (TOPROL-XL) 24 hr tablet 100 mg, Daily, Oral    Plan  - BP is controlled, no changes needed to their regimen  - Hydralazine PRN    Type 2 diabetes mellitus with  hyperlipidemia  Hemoglobin A1C   Date Value Ref Range Status   08/08/2024 6.2 (H) 4.0 - 5.6 % Final     Comment:     ADA Screening Guidelines:  5.7-6.4%  Consistent with prediabetes  >or=6.5%  Consistent with diabetes    High levels of fetal hemoglobin interfere with the HbA1C  assay. Heterozygous hemoglobin variants (HbS, HgC, etc)do  not significantly interfere with this assay.   However, presence of multiple variants may affect accuracy.     05/03/2024 5.4 4.6 - 6.2 % Final   05/04/2023 6.4 (H) 4.0 - 5.6 % Final     Comment:     ADA Screening Guidelines:  5.7-6.4%  Consistent with prediabetes  >or=6.5%  Consistent with diabetes    High levels of fetal hemoglobin interfere with the HbA1C  assay. Heterozygous hemoglobin variants (HbS, HgC, etc)do  not significantly interfere with this assay.   However, presence of multiple variants may affect accuracy.     11/02/2022 6.4 (H) 4.0 - 5.6 % Final     Comment:     ADA Screening Guidelines:  5.7-6.4%  Consistent with prediabetes  >or=6.5%  Consistent with diabetes    High levels of fetal hemoglobin interfere with the HbA1C  assay. Heterozygous hemoglobin variants (HbS, HgC, etc)do  not significantly interfere with this assay.   However, presence of multiple variants may affect accuracy.        -Accuchecks and low dose SSI        Final Active Diagnoses:    Diagnosis Date Noted POA    PRINCIPAL PROBLEM:  Community acquired pneumonia [J18.9] 12/12/2024 Yes    Debility [R53.81] 12/12/2024 Yes    Dizziness [R42] 12/12/2024 Yes    Atrial fibrillation [I48.91] 06/10/2024 Yes    Visual hallucinations [R44.1] 01/18/2024 Yes    Essential hypertension [I10] 10/21/2020 Yes    Type 2 diabetes mellitus with hyperlipidemia [E11.69, E78.5] 09/06/2016 Yes    HFrEF (heart failure with reduced ejection fraction) [I50.20] 05/27/2015 Yes      Problems Resolved During this Admission:       Discharged Condition: stable    Disposition: Rehab Facility    Follow Up:   Follow-up Information        "Padmini Boucher MD. Schedule an appointment as soon as possible for a visit in 3 day(s).    Specialty: Internal Medicine  Contact information:  [address]  [address]  [facility] [address] [phone]               Bell Brown PA-C. Schedule an appointment as soon as possible for a visit.    Specialty: Pulmonary Disease  Why: followup pneumonia  Contact information:  [address]  [facility] [address] [phone]                           Patient Instructions:      Ambulatory referral/consult to Pulmonology   Standing Status: Future   Referral Priority: Routine Referral Type: Consultation   Referral Reason: Specialty Services Required   Requested Specialty: Pulmonary Disease   Number of Visits Requested: 1     Ambulatory referral/consult to Outpatient Case Management   Referral Priority: Routine Referral Type: Consultation   Referral Reason: Specialty Services Required   Number of Visits Requested: 1     Diet Cardiac     Notify your health care provider if you experience any of the following:  temperature >100.4     Notify your health care provider if you experience any of the following:  difficulty breathing or increased cough     Other Wound Care Instructions   Order Comments: Stage 2 PI sacrum:   1. Cleanse with saline   2. Pat dry   3. Paint with cavilon   4. Apply duoderm   5. Change twice weekly and prn soiled       Significant Diagnostic Studies: Labs: BMP:   Recent Labs   Lab 12/16/24  0600   *      K 4.8      CO2 27   BUN 15   CREATININE 0.9   CALCIUM 8.9   , CBC   Recent Labs   Lab 12/16/24  0600   WBC 14.07*   HGB 11.7*   HCT 36.8*      , INR No results found for: "INR", "PROTIME", Lipid Panel   Lab Results   Component Value Date    CHOL 140 08/08/2024    HDL 57 08/08/2024    LDLCALC 69.0 08/08/2024    TRIG 70 08/08/2024    CHOLHDL 40.7 08/08/2024   , and Troponin   Recent Labs   Lab 12/12/24  0655   TROPONINI <0.006     Radiology: X-Ray: CXR: X-Ray " Chest 1 View (CXR): No results found for this visit on 12/12/24.  X-Ray Chest AP Portable   Final Result      1.  Findings concerning for a left lower lobe pneumonia with parapneumonic effusion.  Treatment for presumed pneumonia and follow-up x-rays in 4-6 weeks recommended to ensure resolution after adequate treatment.      2.  Stable findings as noted above.         Electronically signed by: Shlomo Turner MD   Date:    12/12/2024   Time:    07:23           Pending Diagnostic Studies:       None           Medications:  Reconciled Home Medications:      Medication List        START taking these medications      levoFLOXacin 500 MG tablet  Commonly known as: LEVAQUIN  Take 1 tablet (500 mg total) by mouth once daily. for 5 days            CONTINUE taking these medications      albuterol 90 mcg/actuation inhaler  Commonly known as: PROVENTIL/VENTOLIN HFA  Inhale 2 puffs into the lungs every 6 (six) hours as needed for Wheezing or Shortness of Breath.     apixaban 2.5 mg Tab  Commonly known as: ELIQUIS  Take 2.5 mg by mouth 2 (two) times daily.     aspirin 81 MG Chew  Take 81 mg by mouth once daily.     blood sugar diagnostic Strp  Commonly known as: CONTOUR TEST STRIPS  TEST BLOOD GLUCOSE ONCE DAILY     ferrous gluconate 324 MG tablet  Commonly known as: FERGON  Take 324 mg by mouth daily with breakfast.     FLUoxetine 20 MG capsule  Take 1 capsule (20 mg total) by mouth once daily.     folic acid 1 MG tablet  Commonly known as: FOLVITE  Take 1 mg by mouth once daily.     furosemide 20 MG tablet  Commonly known as: LASIX  Take 20 mg by mouth as needed.     lancets Misc  Commonly known as: MICROLET LANCET  TEST BLOOD GLUCOSE ONCE DAILY     memantine 10 MG Tab  Commonly known as: NAMENDA  Take 10 mg by mouth 2 (two) times daily.     metFORMIN 500 MG tablet  Commonly known as: GLUCOPHAGE  Take 1 tablet (500 mg total) by mouth 2 (two) times daily.     methocarbamoL 500 MG Tab  Commonly known as: ROBAXIN  Take 500 mg by  mouth 4 (four) times daily as needed.     metoprolol succinate 100 MG 24 hr tablet  Commonly known as: TOPROL-XL  Take 1 tablet (100 mg total) by mouth once daily.     oxyCODONE-acetaminophen 2.5-325 mg per tablet  Commonly known as: PERCOCET  Take 0.5 tablets by mouth every 4 (four) hours as needed for Pain.     polyethylene glycol 17 gram Pwpk  Commonly known as: GLYCOLAX  Take 17 g by mouth 2 (two) times daily.     predniSONE 20 MG tablet  Commonly known as: DELTASONE  Take 20 mg by mouth once daily.     rivastigmine 4.6 mg/24 hour Pt24  Commonly known as: EXELON  Place 1 patch onto the skin once daily.     SENNA WITH DOCUSATE SODIUM 8.6-50 mg per tablet  Generic drug: senna-docusate 8.6-50 mg  Take 2 tablets by mouth 2 (two) times a day.     tamsulosin 0.4 mg Cap  Commonly known as: FLOMAX  Take 1 capsule by mouth once daily.     VITAMIN B-12 500 MCG tablet  Generic drug: cyanocobalamin  Take 500 mcg by mouth once daily.            STOP taking these medications      amoxicillin-clavulanate 875-125mg 875-125 mg per tablet  Commonly known as: AUGMENTIN              Indwelling Lines/Drains at time of discharge:   Lines/Drains/Airways       None                   Time spent on the discharge of patient: 31 minutes         SRUTHI MitchellP-C  Department of Hospital Medicine  '[place] - Telemetry ([facility])

## 2024-12-16 NOTE — PLAN OF CARE
A209/A209 NAS Chatman is a 84 y.o.female admitted on 12/12/2024 for Community acquired pneumonia   Code Status: Full Code MRN: [id number]   Review of patient's allergies indicates:   Allergen Reactions    Codeine      Other reaction(s): Unknown  unknown    Diclofenac Nausea And Vomiting    Doxycycline      Other reaction(s): Unknown  unknown    Iodinated contrast media Hives     Pt reports rxn to iv dye years ago,but no rxn to oral contrast    Sulfa (sulfonamide antibiotics)      Other reaction(s): Unknown  unknown     Past Medical History:   Diagnosis Date    Advance care planning 8/2/2024    Allergy     Anxiety     Asthma     Atrial fibrillation 6/10/2024    Coronary artery disease 10/18/2018    DM (diabetes mellitus)     Essential hypertension 10/21/2020    IBS (irritable bowel syndrome)     Migraine headache     Mitral valve prolapse     Type 2 diabetes mellitus with hyperlipidemia 9/6/2016      PRN meds    acetaminophen, 650 mg, Q8H PRN  dextrose 10%, 12.5 g, PRN  dextrose 10%, 25 g, PRN  glucagon (human recombinant), 1 mg, PRN  glucose, 16 g, PRN  glucose, 24 g, PRN  hydrALAZINE, 10 mg, Q6H PRN  insulin aspart U-100, 0-5 Units, QID (AC + HS) PRN  melatonin, 3 mg, Nightly PRN  ondansetron, 4 mg, Q8H PRN  sodium chloride 0.9%, 10 mL, PRN      Chart check completed. Will continue plan of care.         Temple Coma Scale Score: 15     Lead Monitored: Lead II Rhythm: normal sinus rhythm    Cardiac/Telemetry Box Number: [id number]  VTE Core Measure: Pharmacological prophylaxis initiated/maintained Last Bowel Movement: 12/15/24  Diet Cardiac Fluid - 1500mL; Standard Tray  Voiding Characteristics: voids spontaneously without difficulty  Alcides Score: 14  Fall Risk Score: 19  Accucheck []   Freq?      Lines/Drains/Airways       Peripheral Intravenous Line  Duration                  Peripheral IV - Single Lumen 12/12/24 0837 20 G Right Antecubital 3 days

## 2024-12-16 NOTE — CHAPLAIN
Initial visit with patient.  Visited with patient to assess for spiritual and emotional needs.  Pt was doing good at the time of my visit.  She mentioned that she would be going to rehab today and that she is hoping to regain her strength back from being there.  I prayed to pt before leaving and spiritual care remains available as needed.    Chaplain Jerod Calabrese M.Div., [facility]

## 2024-12-16 NOTE — PLAN OF CARE
Problem: Adult Inpatient Plan of Care  Goal: Plan of Care Review  Outcome: Progressing  Goal: Patient-Specific Goal (Individualized)  Outcome: Progressing  Goal: Absence of Hospital-Acquired Illness or Injury  Outcome: Progressing  Goal: Optimal Comfort and Wellbeing  Outcome: Progressing  Goal: Readiness for Transition of Care  Outcome: Progressing     Problem: Skin Injury Risk Increased  Goal: Skin Health and Integrity  Outcome: Progressing     Problem: Diabetes Comorbidity  Goal: Blood Glucose Level Within Targeted Range  Outcome: Progressing     Problem: Pneumonia  Goal: Fluid Balance  Outcome: Progressing  Goal: Resolution of Infection Signs and Symptoms  Outcome: Progressing  Goal: Effective Oxygenation and Ventilation  Outcome: Progressing     Problem: Wound  Goal: Optimal Coping  Outcome: Progressing  Goal: Optimal Functional Ability  Outcome: Progressing  Goal: Absence of Infection Signs and Symptoms  Outcome: Progressing  Goal: Improved Oral Intake  Outcome: Progressing  Goal: Optimal Pain Control and Function  Outcome: Progressing  Goal: Skin Health and Integrity  Outcome: Progressing  Goal: Optimal Wound Healing  Outcome: Progressing     Problem: Fall Injury Risk  Goal: Absence of Fall and Fall-Related Injury  Outcome: Progressing     Problem: Fatigue  Goal: Improved Activity Tolerance  Outcome: Progressing

## 2024-12-16 NOTE — PLAN OF CARE
GOOD PARTICIPATION, IMPROVED TOLERANCE TO OOB MOBILITY, SBA FOR BED MOBILITY, CONSTANTINO FOR TF'S AND GAIT WITH RW

## 2024-12-16 NOTE — PT/OT/SLP PROGRESS
Physical Therapy Treatment    Patient Name:  Alon Cahtman   MRN:  [id number]    Recommendations:     Discharge Recommendations: High Intensity Therapy  Discharge Equipment Recommendations: to be determined by next level of care  Barriers to discharge: Decreased caregiver support-PT'S DAUGHTER IS RETURNING TO WORK    Assessment:     Alon Chatman is a 84 y.o. female admitted with a medical diagnosis of Community acquired pneumonia.  She presents with the following impairments/functional limitations: weakness, impaired endurance, impaired functional mobility, gait instability, impaired balance, decreased safety awareness, decreased lower extremity function, decreased upper extremity function, decreased coordination.    Rehab Prognosis: Good; patient would benefit from acute skilled PT services to address these deficits and reach maximum level of function.    Recent Surgery: * No surgery found *      Plan:     During this hospitalization, patient to be seen 3 x/week to address the identified rehab impairments via gait training, therapeutic activities, therapeutic exercises and progress toward the following goals:    Plan of Care Expires:  12/26/24    Subjective     Chief Complaint: AGREEABLE TO TX., REPORTS MILD DIZZINESS AND NAUSEA WITH ACTIVITY-RESOLVED WITH REST  Patient/Family Comments/goals: READY TO WALK  Pain/Comfort:  Pain Rating 1: 0/10      Objective:     Communicated with NURSE HOFF prior to session.  Patient found supine with telemetry, peripheral IV, bed alarm upon PT entry to room.     General Precautions: Standard, fall, hearing impaired  Orthopedic Precautions: N/A  Braces: N/A  Respiratory Status: Room air     Functional Mobility:  Bed Mobility:     Rolling Right: stand by assistance  Scooting: stand by assistance-SEATED FWD/BWD SCOOTING  Supine to Sit: stand by assistance-ENCOURAGED LOG ROLL  Transfers:     Sit to Stand:  minimum assistance with rolling walker  Bed to Chair: minimum  "assistance with  rolling walker  using  Step Transfer  Gait: PT AMB 20', 25' WITH RW AND CONSTANTINO, 1 SEATED REST REQUIRED DUE TO FATIGUE, CHAIR IN TOW FOR SAFETY, SHORTER STEP LENGTH WITH NARROW RICHARD BUT NO GROSS LOB OR SOB ON ROOM AIR, CUES FOR UPRIGHT POSTURE AND RW SAFETY, GOOD EFFORT DESPITE FATIGUE-WILLING TO PERFORM 2ND TRIAL  Balance: FAIR+ STATIC SITTING BALANCE, POOR+ DYNAMIC BALANCE DURING GAIT  REVIEW BLE THEREX TO PERFORM WHILE SEATED IN CHAIR: HIP FLEX/EXT, LAQ, AP'S  PT SET UP TO PERFORM G/H WHILE SEATED IN CHAIR  SET UP FOR BREAKFAST    AM-PAC 6 CLICK MOBILITY  Turning over in bed (including adjusting bedclothes, sheets and blankets)?: 4  Sitting down on and standing up from a chair with arms (e.g., wheelchair, bedside commode, etc.): 3  Moving from lying on back to sitting on the side of the bed?: 4  Moving to and from a bed to a chair (including a wheelchair)?: 3  Need to walk in hospital room?: 3  Climbing 3-5 steps with a railing?: 1  Basic Mobility Total Score: 18     Treatment & Education:  PT EDUCATION:  - ROLE OF P.T. AND POC IN ACUTE CARE HOSPITAL SETTING  - RW USE AND SAFETY DURING TF'S AND GAIT  - ENCOURAGED TO INCREASE TIME OOB IN CHAIR TO TOLERANCE   - TO CONTINUE THERAPUETIC EXERCISES THROUGHOUT THE DAY TO INCREASE ACTIVITY TOLERANCE AND DECREASE RISK FOR PNEUMONIA AND BLOOD CLOTS: HIP FLEX/EXT, HIP ABD/ADD, QUAD SET, HEEL SLIDE, AP  - RISK FOR FALLS DUE TO GENERALIZED WEAKNESS, EDUCATED ON "CALL DON'T FALL", ENCOURAGED TO CALL FOR ASSISTANCE WITH ALL NEEDS SUCH AS BED<>CHAIR TRANSFERS OR TRIPS TO BATHROOM, PT AGREEABLE TO SAFETY PRECAUTIONS    Patient left up in chair with all lines intact, call button in reach, chair alarm on, and NURSE notified..    GOALS:   Multidisciplinary Problems       Physical Therapy Goals          Problem: Physical Therapy    Goal Priority Disciplines Outcome Interventions   Physical Therapy Goal     PT, PT/OT Progressing    Description: LTG'S TO BE MET IN 14 DAYS " (12-26-24)  PT WILL REQUIRE CONSTANTINO FOR BED MOBILITY  PT WILL REQUIRE CONSTANTINO FOR BED<>CHAIR TF'S  PT WILL  FEET WITH RW AND CONSTANTINO  PT WILL INC AMPAC SCORE BY 2 POINTS TO PROGRESS GROSS FUNC MOBILITY                         Time Tracking:     PT Received On: 12/16/24  PT Start Time: 0700     PT Stop Time: 0730  PT Total Time (min): 30 min     Billable Minutes: Gait Training 15 and Therapeutic Activity 15    Treatment Type: Treatment  PT/PTA: PT     Number of PTA visits since last PT visit: 0     12/16/2024

## 2024-12-16 NOTE — PT/OT/SLP PROGRESS
Physical Therapy Treatment    Patient Name:  Alon Chatman   MRN:  [id number]    Recommendations:     Discharge Recommendations: High Intensity Therapy  Discharge Equipment Recommendations: to be determined by next level of care  Barriers to discharge: Decreased caregiver support-DAUGHTER RETURNING TO WORK    Assessment:     Alon Chatman is a 84 y.o. female admitted with a medical diagnosis of Community acquired pneumonia.  She presents with the following impairments/functional limitations: weakness, impaired endurance, impaired functional mobility, gait instability, impaired balance, decreased safety awareness, decreased lower extremity function, decreased upper extremity function, decreased coordination.    Rehab Prognosis: Good; patient would benefit from acute skilled PT services to address these deficits and reach maximum level of function.    Recent Surgery: * No surgery found *     Plan:     During this hospitalization, patient to be seen 3 x/week to address the identified rehab impairments via gait training, therapeutic activities, therapeutic exercises and progress toward the following goals:    Plan of Care Expires:  12/26/24    Subjective     Chief Complaint: PAIN ON BOTTOM FROM BEDSORE SITTING IN CHAIR-THERAPIST RETRIEVED WAFFLE CUSHION FOR OOB SITTING NEXT TIME  Patient/Family Comments/goals: READY TO RETURN TO BED ONLY DUE TO PAIN FROM BED SORE, PT WAS ABLE TO TOLERATE EATING BREAKFAST IN CHAIR ~40 MINUTES  Pain/Comfort:  Pain Rating 1: 7/10  Location 1:  (BOTTOM-BED SORE)  Pain Addressed 1: Reposition (RETURN TO BED, OFFLOADING)      Objective:     Communicated with NURSE HOFF prior to session.  Patient found up in chair with telemetry, peripheral IV, bed alarm upon PT entry to room.     General Precautions: Standard, fall, hearing impaired  Orthopedic Precautions: N/A  Braces: N/A  Respiratory Status: Room air     Functional Mobility:  Bed Mobility:     Rolling Left:  stand by  "assistance  Scooting: stand by assistance-SEATED SCOOT FWD/BWD  Bridging: stand by assistance-SUPINE SCOOT TOWARD HOB WITH DIRECTION  Sit to Supine: stand by assistance  Transfers:     Sit to Stand:  minimum assistance with rolling walker  Chair to bed: minimum assistance with  rolling walker  using  Step Transfer  Gait: PT TOOK APPROX. 5 STEPS WITH RW AND CONSTANTINO TO TF FROM BED TO CHAIR, GOOD EFFORT DESPITE MILD FATIGUE  Balance: FAIR+ SITTING BALANCE, POOR+ DYNAMIC BALANCE DURING TF    AM-PAC 6 CLICK MOBILITY  Turning over in bed (including adjusting bedclothes, sheets and blankets)?: 4  Sitting down on and standing up from a chair with arms (e.g., wheelchair, bedside commode, etc.): 3  Moving from lying on back to sitting on the side of the bed?: 4  Moving to and from a bed to a chair (including a wheelchair)?: 3  Need to walk in hospital room?: 3  Climbing 3-5 steps with a railing?: 1  Basic Mobility Total Score: 18     Treatment & Education:  PT EDUCATION:  - RW USE AND SAFETY DURING TF'S AND GAIT  - ENCOURAGED TO INCREASE TIME OOB IN CHAIR TO TOLERANCE   - TO CONTINUE THERAPUETIC EXERCISES THROUGHOUT THE DAY TO INCREASE ACTIVITY TOLERANCE AND DECREASE RISK FOR PNEUMONIA AND BLOOD CLOTS: HIP FLEX/EXT, HIP ABD/ADD, QUAD SET, HEEL SLIDE, AP  - RISK FOR FALLS DUE TO GENERALIZED WEAKNESS, EDUCATED ON "CALL DON'T FALL", ENCOURAGED TO CALL FOR ASSISTANCE WITH ALL NEEDS SUCH AS BED<>CHAIR TRANSFERS OR TRIPS TO BATHROOM, PT AGREEABLE TO SAFETY PRECAUTIONS    Patient left HOB elevated, SIDE LYING with all lines intact, call button in reach, and NURSE notified..    GOALS:   Multidisciplinary Problems       Physical Therapy Goals          Problem: Physical Therapy    Goal Priority Disciplines Outcome Interventions   Physical Therapy Goal     PT, PT/OT Progressing    Description: LTG'S TO BE MET IN 14 DAYS (12-26-24)  PT WILL REQUIRE CONSTANTINO FOR BED MOBILITY  PT WILL REQUIRE CONSTANTINO FOR BED<>CHAIR TF'S  PT WILL  FEET WITH RW " AND CONSTANTINO  PT WILL INC AMPAC SCORE BY 2 POINTS TO PROGRESS GROSS FUNC MOBILITY                         Time Tracking:     PT Received On: 12/16/24  PT Start Time: 0800     PT Stop Time: 0815  PT Total Time (min): 15 min     Billable Minutes: Therapeutic Activity 15    Treatment Type: Treatment  PT/PTA: PT     Number of PTA visits since last PT visit: 0     12/16/2024

## 2024-12-16 NOTE — PT/OT/SLP PROGRESS
"Occupational Therapy   Treatment    Name: Alon Chatman  MRN: [id number]  Admitting Diagnosis:  Community acquired pneumonia       Recommendations:     Discharge Recommendations: High Intensity Therapy  Discharge Equipment Recommendations:  to be determined by next level of care  Barriers to discharge:  None    Assessment:     Alon Chatman is a 84 y.o. female with a medical diagnosis of Community acquired pneumonia.  She presents with the following performance deficits affecting function are weakness, impaired endurance, impaired self care skills, impaired functional mobility, gait instability, impaired balance, impaired cardiopulmonary response to activity, decreased safety awareness, decreased lower extremity function, decreased upper extremity function, decreased coordination.     Rehab Prognosis:  Fair; patient would benefit from acute skilled OT services to address these deficits and reach maximum level of function.       Plan:     Patient to be seen 2 x/week to address the above listed problems via self-care/home management, therapeutic activities, therapeutic exercises  Plan of Care Expires: 12/26/24  Plan of Care Reviewed with: patient    Subjective     Chief Complaint: "I want to get better"  Patient/Family Comments/goals: to get stronger and return home  Pain/Comfort:  Pain Rating 1: 0/10    Objective:     Communicated with: nurseFadi, prior to session.  Patient found supine with bed alarm, telemetry, peripheral IV upon OT entry to room.    General Precautions: Standard, fall, hearing impaired (CHECK VITALS)    Orthopedic Precautions:N/A  Braces: N/A  Respiratory Status: Room air     Occupational Performance:     Bed Mobility:    Patient completed Scooting/Bridging with stand by assistance  Patient completed Supine to Sit with stand by assistance     Functional Mobility/Transfers:  Patient completed Sit <> Stand Transfer with minimum assistance  with  rolling walker   Patient completed Bed " <> Chair Transfer using Stand Pivot technique with minimum assistance with rolling walker  Functional Mobility: Pt engaged in functional mobility x2 trials, 20 ft first trial and 25 ft second trial with one seated rest break in between. Pt req Min A using RW and continues to req vc for narrow base of support when engaging in task.     Activities of Daily Living:  Grooming: Pt engaged in oral hygiene with set up A for toothbrush and toothpaste while upright in chair       AMPA 6 Click ADL: 16    Treatment & Education:  Encouraged completion of B UE AROM therex throughout the day to increase functional strength and activity tolerance needed for ADL completion.  OT role, plan of care, progression of goals, importance of continued OOB activity, ADL/functional transfer and mobility retraining, call don't fall, safety precautions, fall prevention.      Patient left up in chair with all lines intact, call button in reach, and chair alarm on    GOALS:   Multidisciplinary Problems       Occupational Therapy Goals          Problem: Occupational Therapy    Goal Priority Disciplines Outcome Interventions   Occupational Therapy Goal     OT, PT/OT Progressing    Description: Goals to be met by: 12/26/24     Patient will increase functional independence with ADLs by performing:    Toileting from bedside commode with Stand-by Assistance for hygiene and clothing management.   Toilet transfer to bedside commode with Stand-by Assistance.  Increased functional strength in B UE grossly by 1/2 MM grade.                         Time Tracking:     OT Date of Treatment: 12/16/24  OT Start Time: 0720  OT Stop Time: 0750  OT Total Time (min): 30 min    Billable Minutes:Self Care/Home Management 15  Therapeutic Exercise 15  MINNIE Sandy      OT/MONICA: MONICA     Number of MONICA visits since last OT visit: 2    12/16/2024

## 2024-12-17 ENCOUNTER — OUTPATIENT CASE MANAGEMENT (OUTPATIENT)
Dept: ADMINISTRATIVE | Facility: OTHER | Age: 84
End: 2024-12-17
Payer: MEDICARE

## 2024-12-17 LAB
BACTERIA BLD CULT: NORMAL
BACTERIA BLD CULT: NORMAL

## 2024-12-17 NOTE — PROGRESS NOTES
Outpatient Care Management  Patient Not Qualified    Patient: Alon Chatman  MRN:  [id number]  Date of Service:  12/17/2024  Completed by:  Smiley Malloy RN    Chief Complaint   Patient presents with    OPCM Enrollment Call    Case Closure       Patient Summary           Reason Not Qualified:  Followed by SNF

## 2024-12-23 ENCOUNTER — DOCUMENT SCAN (OUTPATIENT)
Dept: HOME HEALTH SERVICES | Facility: HOSPITAL | Age: 84
End: 2024-12-23
Payer: MEDICARE

## 2024-12-23 ENCOUNTER — TELEPHONE (OUTPATIENT)
Dept: NEUROLOGY | Facility: CLINIC | Age: 84
End: 2024-12-23
Payer: MEDICARE

## 2024-12-23 NOTE — TELEPHONE ENCOUNTER
----- Message from Chrissie sent at 12/23/2024  8:32 AM CST -----  Contact: Smiley ([facility]  .Type:  Sooner Apoointment Request    Caller is requesting a sooner appointment.  Caller declined first available appointment listed below.  Caller will not accept being placed on the waitlist and is requesting a message be sent to doctor.  Name of Caller:Smiley ([facility])  When is the first available appointment?02/27/2025  /Symptoms:hospital follow up   Would the patient rather a call back or a response via [facility]?call  Best Call Back Number:.[phone] (home)     Additional Information:

## 2024-12-24 ENCOUNTER — DOCUMENT SCAN (OUTPATIENT)
Dept: HOME HEALTH SERVICES | Facility: HOSPITAL | Age: 84
End: 2024-12-24
Payer: MEDICARE

## 2024-12-27 ENCOUNTER — DOCUMENT SCAN (OUTPATIENT)
Dept: HOME HEALTH SERVICES | Facility: HOSPITAL | Age: 84
End: 2024-12-27
Payer: MEDICARE

## 2025-01-06 ENCOUNTER — DOCUMENT SCAN (OUTPATIENT)
Dept: HOME HEALTH SERVICES | Facility: HOSPITAL | Age: 85
End: 2025-01-06
Payer: MEDICARE

## 2025-01-11 ENCOUNTER — DOCUMENT SCAN (OUTPATIENT)
Dept: HOME HEALTH SERVICES | Facility: HOSPITAL | Age: 85
End: 2025-01-11
Payer: MEDICARE

## 2025-01-13 DIAGNOSIS — Z00.00 ENCOUNTER FOR MEDICARE ANNUAL WELLNESS EXAM: ICD-10-CM

## 2025-02-05 DIAGNOSIS — Z78.0 MENOPAUSE: ICD-10-CM

## 2025-02-07 ENCOUNTER — PATIENT MESSAGE (OUTPATIENT)
Dept: FAMILY MEDICINE | Facility: CLINIC | Age: 85
End: 2025-02-07
Payer: MEDICARE

## 2025-02-12 ENCOUNTER — OFFICE VISIT (OUTPATIENT)
Dept: PRIMARY CARE CLINIC | Facility: CLINIC | Age: 85
End: 2025-02-12
Payer: MEDICARE

## 2025-02-12 ENCOUNTER — PATIENT MESSAGE (OUTPATIENT)
Dept: FAMILY MEDICINE | Facility: CLINIC | Age: 85
End: 2025-02-12
Payer: MEDICARE

## 2025-02-12 DIAGNOSIS — N30.00 ACUTE CYSTITIS WITHOUT HEMATURIA: Primary | ICD-10-CM

## 2025-02-12 DIAGNOSIS — Z02.9 ADMINISTRATIVE ENCOUNTER: ICD-10-CM

## 2025-02-12 DIAGNOSIS — Z11.1 TUBERCULOSIS SCREENING: ICD-10-CM

## 2025-02-12 PROCEDURE — 98006 SYNCH AUDIO-VIDEO EST MOD 30: CPT | Mod: 95,,, | Performed by: INTERNAL MEDICINE

## 2025-02-12 RX ORDER — GRANULES FOR ORAL 3 G/1
3 POWDER ORAL ONCE
Qty: 3 G | Refills: 0 | Status: SHIPPED | OUTPATIENT
Start: 2025-02-12 | End: 2025-02-12

## 2025-02-12 NOTE — TELEPHONE ENCOUNTER
----- Message from Vicky sent at 2/12/2025 12:35 PM CST -----  Contact: Alon Mckinney with [facility] is calling to speak with the nurse regarding fosfomycin (MONUROL) 3 gram Pack. Reports medication is to expensive for medication discount and isnt covered on the patients insurance. Please give Faye  a call back at [phone]

## 2025-02-13 RX ORDER — AMOXICILLIN 500 MG/1
500 TABLET, FILM COATED ORAL EVERY 12 HOURS
Qty: 10 TABLET | Refills: 0 | Status: SHIPPED | OUTPATIENT
Start: 2025-02-13 | End: 2025-02-18

## 2025-02-13 RX ORDER — LEVOFLOXACIN 750 MG/1
750 TABLET ORAL DAILY
Qty: 3 TABLET | Refills: 0 | Status: SHIPPED | OUTPATIENT
Start: 2025-02-13

## 2025-02-14 ENCOUNTER — LAB VISIT (OUTPATIENT)
Dept: LAB | Facility: HOSPITAL | Age: 85
End: 2025-02-14
Attending: INTERNAL MEDICINE
Payer: MEDICARE

## 2025-02-14 ENCOUNTER — TELEPHONE (OUTPATIENT)
Dept: FAMILY MEDICINE | Facility: CLINIC | Age: 85
End: 2025-02-14
Payer: MEDICARE

## 2025-02-14 DIAGNOSIS — Z11.1 TUBERCULOSIS SCREENING: ICD-10-CM

## 2025-02-14 PROCEDURE — 86480 TB TEST CELL IMMUN MEASURE: CPT | Performed by: INTERNAL MEDICINE

## 2025-02-14 NOTE — TELEPHONE ENCOUNTER
----- Message from Breana sent at 2/14/2025  1:27 PM CST -----  Contact: Michaela/ [facility]  .Type:  Needs Medical Advice    Who Called:  Michaela     Would the patient rather a call back or a response via [facility]?  Call back   Best Call Back Number:  [phone]    Additional Information:  Michaela is calling in regards to checking on the states of the paperwork that the pt daughter at pt appt on 02/12 and was faxed on 02/10 and states she needs the paperwork before the end of the business day and it can be faxed to [phone].  She states she will be re faxing on today

## 2025-02-14 NOTE — TELEPHONE ENCOUNTER
Can you look above [employer] desk in one of [employer] colored folder and fax over the paper work that's inside for Mrs. Chi? We are still waiting on lab results, they said we can go ahead and fax what we already have now. Thank you.

## 2025-02-15 LAB
GAMMA INTERFERON BACKGROUND BLD IA-ACNC: 0 IU/ML
M TB IFN-G CD4+ BCKGRND COR BLD-ACNC: 0.04 IU/ML
M TB IFN-G CD4+ BCKGRND COR BLD-ACNC: 0.04 IU/ML
MITOGEN IGNF BCKGRD COR BLD-ACNC: 10 IU/ML
TB GOLD PLUS: NEGATIVE

## 2025-02-17 ENCOUNTER — PATIENT MESSAGE (OUTPATIENT)
Dept: PULMONOLOGY | Facility: CLINIC | Age: 85
End: 2025-02-17
Payer: MEDICARE

## 2025-02-18 ENCOUNTER — RESULTS FOLLOW-UP (OUTPATIENT)
Dept: PRIMARY CARE CLINIC | Facility: CLINIC | Age: 85
End: 2025-02-18

## 2025-02-19 NOTE — PROGRESS NOTES
Results have been released via [employer]. Please verify that these have been viewed by patient. If not, please call patient with results.    Please schedule the following orders:  Please print and add to assisted living paper work    I have sent a message to them with the following interpretation (see below).    I have reviewed your recent results.    Tuberculosis screening negative.    Please do not hesitate to call or message with any additional questions or concerns.    Padmini Boucher MD

## 2025-02-23 VITALS
WEIGHT: 105 LBS | DIASTOLIC BLOOD PRESSURE: 60 MMHG | HEIGHT: 66 IN | SYSTOLIC BLOOD PRESSURE: 139 MMHG | BODY MASS INDEX: 16.88 KG/M2

## 2025-02-23 NOTE — PROGRESS NOTES
Primary Care Telemedicine Note  The patient location is:  Patient Home   The chief complaint leading to consultation is: assisted living documentation/UTI  Total time spent with patient: 10 min    Visit type: Virtual visit with synchronous audio and video  Each patient to whom he or she provides medical services by telemedicine is:  (1) informed of the relationship between the physician and patient and the respective role of any other health care provider with respect to management of the patient; and (2) notified that he or she may decline to receive medical services by telemedicine and may withdraw from such care at any time.    Assessment/Plan:    1. Acute cystitis without hematuria   -urinalysis and urine culture performed by home health   -received and reviewed these results   -results recommending treatment with 1 time dose of fosfomycin   -patient denies any current symptoms  -     fosfomycin (MONUROL) 3 gram Pack; Take 3 g by mouth once. for 1 dose  Dispense: 3 g; Refill: 0    2. Tuberculosis screening  -     QUANTIFERON GOLD TB; Future; Expected date: 02/12/2025    3. Administrative encounter   -patient planning on moving into an assisted living facility   -required paper work and testing is needed, included TB screening   -quant gold ordered      Visit today included increased complexity associated with the care of the episodic problem(s) addressed and managing the longitudinal care of the patient due to the serious and/or complex managed problem(s). See above assessment/plan.    Follow up if symptoms worsen or fail to improve.    Padmini Boucher MD  _____________________________________________________________________________________________________________________________________________________    HPI:    Patient is an established patient who presents today via virtual visit.    History of Present Illness  The patient is an 84-year-old female who is presenting virtually to discuss assisted living  facility paperwork and UTI. She is accompanied by her daughter.    The patient's daughter reports that the patient has been residing with her for several months. The patient is scheduled to transition to [facility], located at the intersection of [address] and [facility], where she will be in assisted living. Memory care services are not deemed necessary at this time. The patient's advanced directive is on file at [facility], with a copy also held by the patient's son. The daughter plans to upload this document to [facility]'s system. The patient is currently designated as DNR. The daughter has been informed that the patient requires either a chest x-ray or a TB test.    Also discussed the recent UA and urine culture results. Although the patient remains asymptomatic, the recent urine studies do indicate an ongoing UTI.      No other new complaints today.      Past Medical History:  Past Medical History:   Diagnosis Date    Advance care planning 8/2/2024    Allergy     Anxiety     Asthma     Atrial fibrillation 6/10/2024    Coronary artery disease 10/18/2018    DM (diabetes mellitus)     Essential hypertension 10/21/2020    IBS (irritable bowel syndrome)     Migraine headache     Mitral valve prolapse     Type 2 diabetes mellitus with hyperlipidemia 9/6/2016       Medications Ordered Prior to Encounter[1]    Review of Systems   Constitutional:  Negative for chills, fever and malaise/fatigue.   HENT:  Positive for hearing loss.    Eyes:  Negative for discharge.   Respiratory:  Negative for cough, shortness of breath and wheezing.    Cardiovascular:  Negative for chest pain, palpitations and leg swelling.   Gastrointestinal:  Negative for abdominal pain, constipation, diarrhea and vomiting.   Genitourinary:  Negative for dysuria, flank pain, frequency, hematuria and urgency.   Musculoskeletal:  Negative for back pain and joint pain.   Neurological:  Negative for headaches.    Psychiatric/Behavioral:  Negative for depression. The patient is not nervous/anxious.          Physical Exam   Vitals:    02/12/25 1120   BP: 139/60      .FLOWAMB[14   BMI Readings from Last 1 Encounters:   02/12/25 16.95 kg/m²       Physical Exam  Constitutional:       General: She is not in acute distress.     Appearance: She is well-developed. She is not ill-appearing or toxic-appearing.   HENT:      Head: Normocephalic and atraumatic.   Pulmonary:      Effort: Pulmonary effort is normal. No respiratory distress.   Abdominal:      General: There is no distension.   Musculoskeletal:         General: Normal range of motion.      Cervical back: Normal range of motion.   Neurological:      Mental Status: She is alert and oriented to person, place, and time.   Psychiatric:         Mood and Affect: Mood normal.         Behavior: Behavior normal.         Thought Content: Thought content normal.         Judgment: Judgment normal.           DISCLAIMER: This note was compiled by using a speech recognition dictation system and therefore please be aware that typographical / speech recognition errors can and do occur.  Please contact me if you see any errors specifically.  Consent was obtained for [employer] recording system prior to the visit.  Answers submitted by the patient for this visit:  Review of Systems Questionnaire (Submitted on 2/12/2025)  activity change: No  trouble swallowing: No  chest tightness: No  difficulty urinating: No  dysphoric mood: No         [1]   Current Outpatient Medications on File Prior to Visit   Medication Sig Dispense Refill    apixaban (ELIQUIS) 2.5 mg Tab Take 2.5 mg by mouth 2 (two) times daily.      aspirin 81 MG Chew Take 81 mg by mouth once daily.      blood sugar diagnostic (CONTOUR TEST STRIPS) Strp TEST BLOOD GLUCOSE ONCE DAILY 100 each 11    cyanocobalamin (VITAMIN B-12) 500 MCG tablet Take 500 mcg by mouth once daily.      ferrous gluconate (FERGON) 324 MG tablet Take 324 mg by mouth  daily with breakfast.      FLUoxetine 20 MG capsule Take 1 capsule (20 mg total) by mouth once daily. 30 capsule 11    folic acid (FOLVITE) 1 MG tablet Take 1 mg by mouth once daily.      furosemide (LASIX) 20 MG tablet Take 20 mg by mouth as needed.      lancets (MICROLET LANCET) Misc TEST BLOOD GLUCOSE ONCE DAILY 100 each 3    memantine (NAMENDA) 10 MG Tab Take 10 mg by mouth 2 (two) times daily.      metFORMIN (GLUCOPHAGE) 500 MG tablet Take 1 tablet (500 mg total) by mouth 2 (two) times daily. 180 tablet 3    metoprolol succinate (TOPROL-XL) 100 MG 24 hr tablet Take 1 tablet (100 mg total) by mouth once daily. 90 tablet 3    oxyCODONE-acetaminophen (PERCOCET) 2.5-325 mg per tablet Take 0.5 tablets by mouth every 4 (four) hours as needed for Pain.      senna-docusate 8.6-50 mg (SENNA WITH DOCUSATE SODIUM) 8.6-50 mg per tablet Take 2 tablets by mouth 2 (two) times a day.      [DISCONTINUED] albuterol (PROVENTIL/VENTOLIN HFA) 90 mcg/actuation inhaler Inhale 2 puffs into the lungs every 6 (six) hours as needed for Wheezing or Shortness of Breath. 18 g 0    [DISCONTINUED] methocarbamoL (ROBAXIN) 500 MG Tab Take 500 mg by mouth 4 (four) times daily as needed.      [DISCONTINUED] polyethylene glycol (GLYCOLAX) 17 gram PwPk Take 17 g by mouth 2 (two) times daily.       No current facility-administered medications on file prior to visit.

## 2025-02-27 ENCOUNTER — OFFICE VISIT (OUTPATIENT)
Dept: NEUROLOGY | Facility: CLINIC | Age: 85
End: 2025-02-27
Payer: MEDICARE

## 2025-02-27 VITALS
HEART RATE: 60 BPM | RESPIRATION RATE: 16 BRPM | DIASTOLIC BLOOD PRESSURE: 82 MMHG | SYSTOLIC BLOOD PRESSURE: 145 MMHG | BODY MASS INDEX: 16.95 KG/M2 | HEIGHT: 66 IN

## 2025-02-27 DIAGNOSIS — I50.20 HFREF (HEART FAILURE WITH REDUCED EJECTION FRACTION): ICD-10-CM

## 2025-02-27 DIAGNOSIS — F41.9 ANXIETY AND DEPRESSION: ICD-10-CM

## 2025-02-27 DIAGNOSIS — G47.00 INSOMNIA, UNSPECIFIED TYPE: ICD-10-CM

## 2025-02-27 DIAGNOSIS — I25.10 CORONARY ARTERY DISEASE INVOLVING NATIVE CORONARY ARTERY OF NATIVE HEART WITHOUT ANGINA PECTORIS: ICD-10-CM

## 2025-02-27 DIAGNOSIS — F02.80 DEMENTIA IN OTHER DISEASES CLASSIFIED ELSEWHERE, UNSPECIFIED SEVERITY, WITHOUT BEHAVIORAL DISTURBANCE, PSYCHOTIC DISTURBANCE, MOOD DISTURBANCE, AND ANXIETY: ICD-10-CM

## 2025-02-27 DIAGNOSIS — M85.89 OSTEOPENIA OF MULTIPLE SITES: ICD-10-CM

## 2025-02-27 DIAGNOSIS — I70.0 AORTIC ATHEROSCLEROSIS: ICD-10-CM

## 2025-02-27 DIAGNOSIS — G20.C PRIMARY PARKINSONISM: ICD-10-CM

## 2025-02-27 DIAGNOSIS — E11.69 HYPERLIPIDEMIA ASSOCIATED WITH TYPE 2 DIABETES MELLITUS: ICD-10-CM

## 2025-02-27 DIAGNOSIS — E78.5 HYPERLIPIDEMIA ASSOCIATED WITH TYPE 2 DIABETES MELLITUS: ICD-10-CM

## 2025-02-27 DIAGNOSIS — R00.2 PALPITATIONS: ICD-10-CM

## 2025-02-27 DIAGNOSIS — F32.A ANXIETY AND DEPRESSION: ICD-10-CM

## 2025-02-27 DIAGNOSIS — I10 ESSENTIAL HYPERTENSION: ICD-10-CM

## 2025-02-27 DIAGNOSIS — E55.9 VITAMIN D DEFICIENCY: ICD-10-CM

## 2025-02-27 DIAGNOSIS — R42 DIZZINESS: ICD-10-CM

## 2025-02-27 DIAGNOSIS — Z96.642 STATUS POST LEFT HIP REPLACEMENT: ICD-10-CM

## 2025-02-27 DIAGNOSIS — E78.5 TYPE 2 DIABETES MELLITUS WITH HYPERLIPIDEMIA: ICD-10-CM

## 2025-02-27 DIAGNOSIS — R44.1 VISUAL HALLUCINATIONS: ICD-10-CM

## 2025-02-27 DIAGNOSIS — K58.0 IRRITABLE BOWEL SYNDROME WITH DIARRHEA: ICD-10-CM

## 2025-02-27 DIAGNOSIS — N30.20 CHRONIC CYSTITIS: ICD-10-CM

## 2025-02-27 DIAGNOSIS — R29.90 MULTIPLE NEUROLOGICAL SYMPTOMS: Primary | ICD-10-CM

## 2025-02-27 DIAGNOSIS — R54 FRAIL ELDERLY: Chronic | ICD-10-CM

## 2025-02-27 DIAGNOSIS — R53.81 DEBILITY: ICD-10-CM

## 2025-02-27 DIAGNOSIS — I48.0 PAROXYSMAL ATRIAL FIBRILLATION: ICD-10-CM

## 2025-02-27 DIAGNOSIS — R25.1 TREMOR: ICD-10-CM

## 2025-02-27 DIAGNOSIS — E11.69 TYPE 2 DIABETES MELLITUS WITH HYPERLIPIDEMIA: ICD-10-CM

## 2025-02-27 DIAGNOSIS — G43.009 MIGRAINE WITHOUT AURA AND WITHOUT STATUS MIGRAINOSUS, NOT INTRACTABLE: ICD-10-CM

## 2025-02-27 DIAGNOSIS — M25.552 LEFT HIP PAIN: ICD-10-CM

## 2025-02-27 DIAGNOSIS — Z91.81 AT HIGH RISK FOR FALLS: ICD-10-CM
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
## 2025-02-27 PROBLEM — J18.9 COMMUNITY ACQUIRED PNEUMONIA: Status: RESOLVED | Noted: 2024-12-12 | Resolved: 2025-02-27

## 2025-02-27 PROBLEM — Z71.89 ADVANCE CARE PLANNING: Status: RESOLVED | Noted: 2024-08-02 | Resolved: 2025-02-27
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
## 2025-02-27 PROCEDURE — 99214 OFFICE O/P EST MOD 30 MIN: CPT | Mod: PBBFAC | Performed by: PSYCHIATRY & NEUROLOGY

## 2025-02-27 PROCEDURE — 99999 PR PBB SHADOW E&M-EST. PATIENT-LVL IV: CPT | Mod: PBBFAC,,, | Performed by: PSYCHIATRY & NEUROLOGY

## 2025-02-27 RX ORDER — GABAPENTIN 100 MG/1
100 CAPSULE ORAL NIGHTLY
Qty: 90 CAPSULE | Refills: 3 | Status: SHIPPED | OUTPATIENT
Start: 2025-02-27 | End: 2026-02-27

## 2025-02-27 RX ORDER — METHOCARBAMOL 500 MG/1
TABLET, FILM COATED ORAL
COMMUNITY
Start: 2025-02-25

## 2025-02-27 NOTE — PROGRESS NOTES
Subjective:       Patient ID: Alon Chatman is a 84 y.o. female.    Chief Complaint: Primary parkinsonism          HPI        BACKGROUND HISTORY       The patient started having tremors in 2022.  The patient's tremors are strictly in the LUE and LLE. No head tremors. No voice tremors. The tremors seem to be mainly at rest and during activity. The patient's gait slowed down. Postural stability is impaired alongside with occasional mild postural lightheadedness. The patient also complains of muscle stiffness. No hallucinations. No delusions. No alteration of mental status.  No language or communication problems. No muscle weakness or twitching. No trouble swallowing. No eye movement difficulty. The patient's voice tone and volume have decreased. In addition, the facial expressions have declined. The handwriting has gotten smaller. No history of TBI. No history of urine incontinence. No history of memory loss. No  drooling and no trouble swallowing. The patient reports reactive depression. Denies spells of sudden laughing but they seem to be unprovoked. No suicidal or homicidal ideations. No antipsychotic or antiemetic meds use for prolonged time. No history of CO poisoning. No family history of similar symptoms. The patient denies history of strokes. No history of occupational toxic exposure. No history of chronic liver disease. Ordered Jackie Scan and started her on CD/LD 25/100 mg QID.I also recommended PT. On 04- Jackie Scan is abnormal and confirms Primary Parkinsonism like Parkinson's disease. . Could not tolerate CD/LD due to N/V. Tolerated Mirapex 0.125 mg TID which's less effective than CD/LD. The patient has enjoyed and benefited tremendously from PT.Continued Pramipexole (Mirapex) and changed from 0.125 mg TID to 0.25 mg TID.   She is complaining of trouble sleeping despite being on Amitriptyline 50 mg QHS which she used for decades and eradicated her migraines and daughter reported some instances of  VH and it remains unclear if she was doubling her medication (Mirapex) so I tried  Quetiapine (Seroquel) 25 mg PO QHS.Had a fall in  and underwent LT PHR and started on Namenda 5 mg BID while in rehab. Quetiapine (Seroquel) 25 mg PO QHS has helped but still struggles with VH and Insomnia.  Was found down in the bathroom on 10-.  During hospitalization Pramipexole(Mirapex) was stopped, Quetiapine (Seroquel) was replaced  with Olanzapine (Zyprex) 5 mg QHS, Namenda was changed to  10 mg BID.          INTERVAL HISTORY       Accompanied by daughter on 02- for follow up.    Remains on Namenda 10 mg PO BID. Olanzapine (Zyprex) and Rivastigmine (Exelon) were stopped.  Seems to be doing better and moved to assisted living. She complained of chronic Lt hip pain.           Review of Systems   Constitutional:  Negative for appetite change and fatigue.   HENT:  Positive for hearing loss. Negative for tinnitus.    Eyes:  Negative for photophobia and visual disturbance.   Respiratory:  Negative for apnea and shortness of breath.    Cardiovascular:  Negative for chest pain and palpitations.   Gastrointestinal:  Negative for nausea and vomiting.   Endocrine: Negative for cold intolerance and heat intolerance.   Genitourinary:  Negative for difficulty urinating and urgency.   Musculoskeletal:  Positive for arthralgias and gait problem. Negative for back pain, joint swelling, myalgias, neck pain and neck stiffness.   Skin:  Negative for color change and rash.   Allergic/Immunologic: Negative for environmental allergies and immunocompromised state.   Neurological:  Positive for tremors and speech difficulty. Negative for dizziness, seizures, syncope, facial asymmetry, weakness, light-headedness, numbness and headaches.   Hematological:  Negative for adenopathy. Does not bruise/bleed easily.   Psychiatric/Behavioral:  Negative for agitation, behavioral problems, confusion, decreased concentration, dysphoric mood,  hallucinations, self-injury, sleep disturbance and suicidal ideas. The patient is nervous/anxious. The patient is not hyperactive.                  Current Outpatient Medications:     apixaban (ELIQUIS) 2.5 mg Tab, Take 2.5 mg by mouth 2 (two) times daily., Disp: , Rfl:     aspirin 81 MG Chew, Take 81 mg by mouth once daily., Disp: , Rfl:     blood sugar diagnostic (CONTOUR TEST STRIPS) Strp, TEST BLOOD GLUCOSE ONCE DAILY, Disp: 100 each, Rfl: 11    cyanocobalamin (VITAMIN B-12) 500 MCG tablet, Take 500 mcg by mouth once daily., Disp: , Rfl:     ferrous gluconate (FERGON) 324 MG tablet, Take 324 mg by mouth daily with breakfast., Disp: , Rfl:     FLUoxetine 20 MG capsule, Take 1 capsule (20 mg total) by mouth once daily., Disp: 30 capsule, Rfl: 11    folic acid (FOLVITE) 1 MG tablet, Take 1 mg by mouth once daily., Disp: , Rfl:     furosemide (LASIX) 20 MG tablet, Take 20 mg by mouth as needed., Disp: , Rfl:     lancets (MICROLET LANCET) Misc, TEST BLOOD GLUCOSE ONCE DAILY, Disp: 100 each, Rfl: 3    levoFLOXacin (LEVAQUIN) 750 MG tablet, Take 1 tablet (750 mg total) by mouth once daily., Disp: 3 tablet, Rfl: 0    memantine (NAMENDA) 10 MG Tab, Take 10 mg by mouth 2 (two) times daily., Disp: , Rfl:     metFORMIN (GLUCOPHAGE) 500 MG tablet, Take 1 tablet (500 mg total) by mouth 2 (two) times daily., Disp: 180 tablet, Rfl: 3    methocarbamoL (ROBAXIN) 500 MG Tab, Take by mouth., Disp: , Rfl:     metoprolol succinate (TOPROL-XL) 100 MG 24 hr tablet, Take 1 tablet (100 mg total) by mouth once daily., Disp: 90 tablet, Rfl: 3    oxyCODONE-acetaminophen (PERCOCET) 2.5-325 mg per tablet, Take 0.5 tablets by mouth every 4 (four) hours as needed for Pain., Disp: , Rfl:     senna-docusate 8.6-50 mg (SENNA WITH DOCUSATE SODIUM) 8.6-50 mg per tablet, Take 2 tablets by mouth 2 (two) times a day., Disp: , Rfl:     gabapentin (NEURONTIN) 100 MG capsule, Take 1 capsule (100 mg total) by mouth every evening., Disp: 90 capsule, Rfl:  3        Past Medical History:   Diagnosis Date    Advance care planning 8/2/2024    Allergy     Anxiety     Anxiety and depression 12/17/2024    - Mood appears stable  - Prozac 20 daily    Asthma     Atrial fibrillation 6/10/2024    Coronary artery disease 10/18/2018    Dementia in other diseases classified elsewhere, unspecified severity, without behavioral disturbance, psychotic disturbance, mood disturbance, and anxiety 2/27/2025    DM (diabetes mellitus)     Essential hypertension 10/21/2020    IBS (irritable bowel syndrome)     Migraine headache     Mitral valve prolapse     Type 2 diabetes mellitus with hyperlipidemia 9/6/2016     Past Surgical History:   Procedure Laterality Date    BREAST BIOPSY      BREAST LUMPECTOMY      EYE SURGERY      HYSTERECTOMY      partial    VA COLONOSCOPY,REMV LESN,FORCEP/CAUTERY  08/23/2012          Social History     Socioeconomic History    Marital status:    Tobacco Use    Smoking status: Never    Smokeless tobacco: Never   Substance and Sexual Activity    Alcohol use: No    Drug use: No    Sexual activity: Not Currently     Social Drivers of Health     Financial Resource Strain: Low Risk  (2/12/2025)    Overall Financial Resource Strain (CARDIA)     Difficulty of Paying Living Expenses: Not hard at all   Food Insecurity: No Food Insecurity (2/12/2025)    Hunger Vital Sign     Worried About Running Out of Food in the Last Year: Never true     Ran Out of Food in the Last Year: Never true   Transportation Needs: No Transportation Needs (2/12/2025)    PRAPARE - Transportation     Lack of Transportation (Medical): No     Lack of Transportation (Non-Medical): No   Physical Activity: Inactive (2/12/2025)    Exercise Vital Sign     Days of Exercise per Week: 0 days     Minutes of Exercise per Session: 0 min   Stress: No Stress Concern Present (2/12/2025)    [employer] - Occupational Stress Questionnaire     Feeling of Stress : Only a little    Housing Stability: Low Risk  (2/12/2025)    Housing Stability Vital Sign     Unable to Pay for Housing in the Last Year: No     Number of Times Moved in the Last Year: 1     Homeless in the Last Year: No             Past/Current Medical/Surgical History, Past/Current Social History, Past/Current Family History and Past/Current Medications were reviewed in detail.        Objective:                     VITAL SIGNS WERE REVIEWED      GENERAL APPEARANCE:     The patient looks comfortable.    BMI 16.95    No signs of respiratory distress.    Normal breathing pattern.    No dysmorphic features    Normal eye contact.       GENERAL MEDICAL EXAM:    HEENT:  Head is atraumatic normocephalic.     FUNDUSCOPIC (OPHTHALMOSCOPIC) EXAMINATION showed no disc edema.      NECK: No JVD. No visible lesions or goiters.     CHEST-CARDIOPULMONARY: No cyanosis. No tachypnea. Normal respiratory effort.    [id number]: No jaundice. No stomas or lesions. No visible hernias. No catheters.     SKIN, HAIR, NAILS: No pathognomonic skin rash.No neurofibromatosis. No visible lesions.No stigmata of autoimmune disease. No clubbing.    LIMBS: No varicose veins. No visible swelling.    MUSCULOSKELETAL: No visible deformities.No visible lesions.           Neurologic Exam     Mental Status   Oriented to person, place, and time.   Follows 3 step commands.   Attention: normal. Concentration: normal.   Speech: speech is normal   Level of consciousness: alert  Able to name object. Able to repeat. Normal comprehension.     Cranial Nerves   Cranial nerves II through XII intact.     CN II   Visual fields full to confrontation.   Visual acuity: normal  Right visual field deficit: none  Left visual field deficit: none     CN III, IV, VI   Pupils are equal, round, and reactive to light.  Extraocular motions are normal.   Right pupil: Size: 2 mm. Shape: regular. Reactivity: brisk. Consensual response: intact. Accommodation: intact.   Left  pupil: Size: 2 mm. Shape: regular. Reactivity: brisk. Consensual response: intact. Accommodation: intact.   CN III: no CN III palsy  CN VI: no CN VI palsy  Nystagmus: none   Diplopia: none  Ophthalmoparesis: none  Upgaze: normal  Downgaze: normal  Conjugate gaze: present  Vestibulo-ocular reflex: present    CN V   Facial sensation intact.   Right facial sensation deficit: none  Left facial sensation deficit: none  Jaw jerk: normal    CN VII   Facial expression full, symmetric.   Right facial weakness: none  Left facial weakness: none    CN VIII   CN VIII normal.   Hearing: impaired    CN IX, X   CN IX normal.   CN X normal.   Palate: symmetric    CN XI   CN XI normal.   Right sternocleidomastoid strength: normal  Left sternocleidomastoid strength: normal  Right trapezius strength: normal  Left trapezius strength: normal    CN XII   CN XII normal.   Tongue: not atrophic  Fasciculations: absent  Tongue deviation: none    Motor Exam   Muscle bulk: normal  Overall muscle tone: normal  Right arm tone: normal  Left arm tone: normal  Right arm pronator drift: absent  Left arm pronator drift: absent  Right leg tone: normal  Left leg tone: normal    Strength   Strength 5/5 throughout.   Right neck flexion: 5/5  Left neck flexion: 5/5  Right neck extension: 5/5  Left neck extension: 5/5  Right deltoid: 5/5  Left deltoid: 5/5  Right biceps: 5/5  Left biceps: 5/5  Right triceps: 5/5  Left triceps: 5/5  Right wrist flexion: 5/5  Left wrist flexion: 5/5  Right wrist extension: 5/5  Left wrist extension: 5/5  Right interossei: 5/5  Left interossei: 5/5  Right iliopsoas: 5/5  Left iliopsoas: 5/5  Right quadriceps: 5/5  Left quadriceps: 5/5  Right hamstrin/5  Left hamstrin/5  Right glutei: 5/5  Left glutei: 5/5  Right anterior tibial: 5/5  Left anterior tibial: 5/5  Right posterior tibial: 5/5  Left posterior tibial: 5/5  Right peroneal: 5/5  Left peroneal: 5/5  Right gastroc: 5/5  Left gastroc: 5/5    Sensory Exam   Light  touch normal.   Right arm light touch: normal  Left arm light touch: normal  Right leg light touch: normal  Left leg light touch: normal  Right arm vibration: normal  Left arm vibration: normal  Right leg vibration: decreased from toes  Left leg vibration: decreased from toes  Proprioception normal.   Right arm proprioception: normal  Left arm proprioception: normal  Right leg proprioception: normal  Left leg proprioception: normal  Pinprick normal.   Right arm pinprick: normal  Left arm pinprick: normal  Right leg pinprick: normal  Left leg pinprick: normal  Graphesthesia: normal  Stereognosis: normal    Gait, Coordination, and Reflexes     Gait  Gait: shuffling (Antalgic)    Coordination   Finger to nose coordination: normal    Tremor   Resting tremor: present (LUE LLE)  Intention tremor: absent  Action tremor: absent    Reflexes   Right brachioradialis: 2+  Left brachioradialis: 2+  Right biceps: 2+  Left biceps: 2+  Right triceps: 2+  Left triceps: 2+  Right patellar: 2+  Left patellar: 2+  Right achilles: 1+  Left achilles: 1+  Right plantar: normal  Left plantar: normal  Right Olivo: absent  Left Olivo: absent  Right ankle clonus: absent  Left ankle clonus: absent  Right pendular knee jerk: absent  Left pendular knee jerk: absent    EXTRAPYRAMIDAL EXAMINATION FOR PARKINSONISM     KEY: 0 NL, 1 MILD, 2 MODERATE, 3 SEVERE, 4 ADVANCED.       HEAD AND NECK AND CRANIAL NERVES EXAMINATION:       Facial expressions: Mild Hypomimia.     Visual Acuity: Decreased.     Smell: Mild Anosmia.    Voice: Significant Hypophonia.     Swallowing: No Dysphagia or Sialorrhea.       NON-MOTOR EXAMINATION:      Autonomic: Mild Orthostasis    Mood: Reactive Depression    Emotional continence: No PBA.     Cognition: No Amnesia.    Behavior: VH      INVOLUNTARY MOVEMENTS:     No Rest Tremor in the RUE     3 Hz  2/4 Rest Tremor in the LUE.     No Rest Tremor in the RLE     3 Hz 2/4 Rest Tremor in the LLE      TONE EXAMINATION:     No  Nuchal Rigidity    No Appendicular Rigidity and Paratonia in the RUE    No Appendicular Rigidity and Paratonia in the LUE.     No appendicular Rigidity or Paratonia in the RLE    No appendicular Rigidity and Paratonia in the LLE.      BRADYKINESIA EXAMINATION:     Rapid Alternating Movements (Elza) are normal in the RT    Rapid Alternating Movements (Elza) are normal in the LT    Positive micrographia.      STANCE, POSTURAL STABILITY AND GAIT:     Antalgic gait.     The patient was unable to stand up with crossed arms without difficulty.    Postural stability by Retropulsion and Propulsion is 2/4 abnormal.     There is evidence of 2/4  gait apraxia, festination/shuffling and gait rigidity.                  Lab Results   Component Value Date    WBC 14.07 (H) 12/16/2024    HGB 11.7 (L) 12/16/2024    HCT 36.8 (L) 12/16/2024    MCV 93 12/16/2024     12/16/2024     Sodium   Date Value Ref Range Status   12/16/2024 138 136 - 145 mmol/L Final     Potassium   Date Value Ref Range Status   12/16/2024 4.8 3.5 - 5.1 mmol/L Final     Chloride   Date Value Ref Range Status   12/16/2024 102 95 - 110 mmol/L Final     CO2   Date Value Ref Range Status   12/16/2024 27 23 - 29 mmol/L Final     Glucose   Date Value Ref Range Status   12/16/2024 113 (H) 70 - 110 mg/dL Final     BUN   Date Value Ref Range Status   12/16/2024 15 8 - 23 mg/dL Final     Creatinine   Date Value Ref Range Status   12/16/2024 0.9 0.5 - 1.4 mg/dL Final     Calcium   Date Value Ref Range Status   12/16/2024 8.9 8.7 - 10.5 mg/dL Final     Total Protein   Date Value Ref Range Status   12/13/2024 5.4 (L) 6.0 - 8.4 g/dL Final     Albumin   Date Value Ref Range Status   12/13/2024 2.5 (L) 3.5 - 5.2 g/dL Final     Total Bilirubin   Date Value Ref Range Status   12/13/2024 0.3 0.1 - 1.0 mg/dL Final     Comment:     For infants and newborns, interpretation of results should be based  on gestational age, weight and in agreement with  clinical  observations.    Premature Infant recommended reference ranges:  Up to 24 hours.............<8.0 mg/dL  Up to 48 hours............<12.0 mg/dL  3-5 days..................<15.0 mg/dL  6-29 days.................<15.0 mg/dL       Alkaline Phosphatase   Date Value Ref Range Status   12/13/2024 47 40 - 150 U/L Final     AST   Date Value Ref Range Status   12/13/2024 10 10 - 40 U/L Final     ALT   Date Value Ref Range Status   12/13/2024 7 (L) 10 - 44 U/L Final     Anion Gap   Date Value Ref Range Status   12/16/2024 9 8 - 16 mmol/L Final     eGFR if    Date Value Ref Range Status   04/29/2022 >60.0 >60 mL/min/1.73 m^2 Final     eGFR if non    Date Value Ref Range Status   04/29/2022 53.3 (A) >60 mL/min/1.73 m^2 Final     Comment:     Calculation used to obtain the estimated glomerular filtration  rate (eGFR) is the CKD-EPI equation.          Lab Results   Component Value Date    TSH 3.358 03/29/2023                 LABORATORY EVALUATION         [id number]    CBC, CMP, TFT, HA1C only remarkable T2DM and Recurrent UTI        RADIOLOGY EVALUATION       04-    Jackie Scan is abnormal and confirms Primary Parkinsonism like Parkinson's disease.       04-      CTH   No acute intracranial process.       10-    CTH Midline high parietal scalp hematoma without underlying skull fracture.  No acute intracranial process.         Reviewed the neuroimaging independently       Assessment:         1. Multiple neurological symptoms    2. Anxiety and depression    3. Aortic atherosclerosis    4. At high risk for falls    5. Paroxysmal atrial fibrillation    6. Chronic cystitis    7. Coronary artery disease involving native coronary artery of native heart without angina pectoris    8. Debility    9. Dizziness    10. Essential hypertension    11. Frail elderly    12. HFrEF (heart failure with reduced ejection fraction)    13. Hyperlipidemia associated with type 2 diabetes mellitus     14. Irritable bowel syndrome with diarrhea    15. Insomnia, unspecified type    16. Migraine without aura and without status migrainosus, not intractable    17. Osteopenia of multiple sites    18. Palpitations    19. Primary parkinsonism    20. Status post left hip replacement    21. Tremor    22. Type 2 diabetes mellitus with hyperlipidemia    23. Visual hallucinations    24. Vitamin D deficiency    25. Dementia in other diseases classified elsewhere, unspecified severity, without behavioral disturbance, psychotic disturbance, mood disturbance, and anxiety    26. Left hip pain            Plan:             IDIOPATHIC PARKINSON'S DISEASE (IPD), LEFT SIDE ONSET 2022  WITH DEMENTIA VS. LEWY BODY DEMENTIA (LBD)        PRECAUTIONS, MONITORING    Avoid driving.    Falling down precautions.    Needs 24/7 care.      PHARMACOTHERAPY       DOPAMINERGIC MEDICATIONS:       Will avoid the class of medication.    Failed Pramipexole (Mirapex) due to VH    Failed CD/LD due to N/V.          NON-MOTOR SYMPTOMS        DYSPHAGIA-SIALORRHEA    Not an issue.        ORTHOSTASIS, RECURRENT FALLS     Monitor clinically.     Orthostatic measures.     Stay off Flomax.    Stay off Lasix.    Stay off Amitriptyline.        COGNITIVE DYSFUNCTION WITH HALLUCINATIONS       Continue Namenda 10 mg BID.    She is off Rivastigmine (Exelon) patch 4.6 mg QD.    She is off Olanzapine (Zyprexa) 5 mg QHS.    Failed Quetiapine (Seroquel) to 100 mg PO QHS.          HIP PAIN       Try  mg PO QHS.                     MEDICAL/SURGICAL COMORBIDITIES     All relevant medical comorbidities noted and managed by primary care physician and medical care team.          HEALTHY LIFESTYLE AND PREVENTATIVE CARE    The patient to adhere to the age-appropriate health maintenance guidelines including screening tests and vaccinations. The patient to adhere to  healthy lifestyle, optimal weight, exercise, healthy diet, good sleep hygiene and avoiding drugs including  smoking, alcohol and recreational drugs.          RTC 6 MONTHS         Herberth Christy MD, FAAN    Attending Neurologist/Epileptologist         Diplomate, American Board of Psychiatry and Neurology    Diplomate, American Board of Clinical Neurophysiology     Fellow, American Academy of Neurology           I spent a total of 30 minutes on the day of the visit.  This includes face to face time and non-face to face time preparing to see the patient (eg, review of tests), obtaining and/or reviewing separately obtained history, documenting clinical information in the electronic or other health record, independently interpreting results and communicating results to the patient/family/caregiver, or care coordinator.

## 2025-02-28 ENCOUNTER — TELEPHONE (OUTPATIENT)
Dept: FAMILY MEDICINE | Facility: CLINIC | Age: 85
End: 2025-02-28
Payer: MEDICARE

## 2025-02-28 NOTE — TELEPHONE ENCOUNTER
----- Message from [employer] sent at 2/28/2025 11:16 AM CST -----  .Type: Patient Call BackWho called:home health abdoulaye Ramirez is the request in detail:  calling concerning needing to put order in for patient to continue speech Can the clinic reply by [facility]?   Would the patient rather a call back or a response via [facility]:

## 2025-03-11 ENCOUNTER — PATIENT MESSAGE (OUTPATIENT)
Dept: PRIMARY CARE CLINIC | Facility: CLINIC | Age: 85
End: 2025-03-11
Payer: MEDICARE

## 2025-03-11 NOTE — LETTER
[facility]  [address]  [address]  [facility] [address]  Phone: [phone] March 13, 2025     Patient: Alon Chatman    YOB: 1940   Date of Visit: 3/11/2025       To Whom It May Concern:    It is my medical opinion that Alon Chatman is unable to personally retrieve her meals from or consume her meals in a congregate setting.     If you have any questions or concerns, please don't hesitate to call.    Sincerely,       Padmini Boucher MD

## 2025-04-04 ENCOUNTER — DOCUMENT SCAN (OUTPATIENT)
Dept: HOME HEALTH SERVICES | Facility: HOSPITAL | Age: 85
End: 2025-04-04
Payer: MEDICARE

## 2025-04-09 ENCOUNTER — PATIENT MESSAGE (OUTPATIENT)
Dept: PRIMARY CARE CLINIC | Facility: CLINIC | Age: 85
End: 2025-04-09
Payer: MEDICARE

## 2025-04-09 DIAGNOSIS — R30.0 DYSURIA: Primary | ICD-10-CM

## 2025-04-10 NOTE — TELEPHONE ENCOUNTER
Can we fax the order? Unsure as to how that works.    I have signed for the following orders AND/OR meds.  Please call the patient and ask the patient to schedule the testing AND/OR inform about any medications that were sent. Medications have been sent to pharmacy listed below      Orders Placed This Encounter   Procedures    Urinalysis, Reflex to Urine Culture     Standing Status:   Future     Expected Date:   4/10/2025     Expiration Date:   6/9/2026     Specimen Source:   Urine     Send normal result to authorizing provider's In Basket if patient is active on MyChart::   Yes              [id number] - [place] - [address] [facility]  [address] [place] [address]  Phone: [phone] Fax: [phone]    [id number] - [place] - [address]   [address] [address]  Phone: [phone] Fax: [phone]

## 2025-04-17 ENCOUNTER — TELEPHONE (OUTPATIENT)
Dept: FAMILY MEDICINE | Facility: CLINIC | Age: 85
End: 2025-04-17
Payer: MEDICARE

## 2025-04-17 NOTE — TELEPHONE ENCOUNTER
----- Message from Rosanna Mayes sent at 4/17/2025  9:14 AM CDT -----  Contact: Melissa painting/ [facility]  ..Type: Provider Requesting Call BackWho Called:Melissa painting/ [facility]What is this regarding?: Urine labsBest Call Back Number:[phone] or FAX# [phone] Information:  Melissa painting/ [facility] call to verify if urine lab that was faxed to you have been received. She states the patient has a UTI. It was faxed 2 days ago. Please call back

## 2025-04-26 ENCOUNTER — DOCUMENT SCAN (OUTPATIENT)
Dept: HOME HEALTH SERVICES | Facility: HOSPITAL | Age: 85
End: 2025-04-26
Payer: MEDICARE

## 2025-05-06 ENCOUNTER — TELEPHONE (OUTPATIENT)
Dept: PRIMARY CARE CLINIC | Facility: CLINIC | Age: 85
End: 2025-05-06
Payer: MEDICARE

## 2025-05-06 DIAGNOSIS — N30.00 ACUTE CYSTITIS WITHOUT HEMATURIA: Primary | ICD-10-CM

## 2025-05-06 DIAGNOSIS — N30.00 ACUTE CYSTITIS WITHOUT HEMATURIA: ICD-10-CM

## 2025-05-06 RX ORDER — AMOXICILLIN AND CLAVULANATE POTASSIUM 875; 125 MG/1; MG/1
1 TABLET, FILM COATED ORAL 2 TIMES DAILY
Qty: 10 TABLET | Refills: 0 | Status: SHIPPED | OUTPATIENT
Start: 2025-05-06 | End: 2025-05-11

## 2025-05-06 RX ORDER — AMOXICILLIN AND CLAVULANATE POTASSIUM 875; 125 MG/1; MG/1
1 TABLET, FILM COATED ORAL 2 TIMES DAILY
Qty: 10 TABLET | Refills: 0 | Status: SHIPPED | OUTPATIENT
Start: 2025-05-06 | End: 2025-05-06 | Stop reason: SDUPTHER

## 2025-05-06 NOTE — TELEPHONE ENCOUNTER
UA order signed and ok to fax back to pharmacy.    UA results show another UTI. Antibiotic sent to pharmacy. Please see if assisted living facility has received the culture results yet?      I have signed for the following orders AND/OR meds.  Please call the patient and ask the patient to schedule the testing AND/OR inform about any medications that were sent. Medications have been sent to pharmacy listed below      No orders of the defined types were placed in this encounter.      Medications Ordered This Encounter   Medications    amoxicillin-clavulanate 875-125mg (AUGMENTIN) 875-125 mg per tablet     Sig: Take 1 tablet by mouth 2 (two) times daily. for 5 days     Dispense:  10 tablet     Refill:  0       [id number] - [place] - [address]  [address] [address] [place] [address]  Phone: [phone] Fax: [phone]

## 2025-05-06 NOTE — TELEPHONE ENCOUNTER
----- Message from Gloria sent at 5/6/2025  3:40 PM CDT -----  Contact: [facility]  .Type:  RX Refill RequestWho Called: [facility] livingRefill or New Rx:RX Name and Strength:amoxicillin-clavulanate 875-125mg (AUGMENTIN) 875-125 mg per tabletHow is the patient currently taking it? (ex. 1XDay):Is this a 30 day or 90 day RX:Preferred Pharm[facility] with phone number[facility]: [phone] or Mail Order:localOrdering Provider:hongWould the patient rather a call back or a response via [facility]? Call backBest Call Back Number:[phone] - nurse of ms rodriguezAdditional Information: RACHELLE BRANHAM is calling to request that medication amoxicillin-clavulanate 875-125mg (AUGMENTIN) 875-125 mg per tablet be called in to pharmacy of their choice listed above.

## 2025-05-28 ENCOUNTER — TELEPHONE (OUTPATIENT)
Dept: FAMILY MEDICINE | Facility: CLINIC | Age: 85
End: 2025-05-28
Payer: MEDICARE

## 2025-05-28 DIAGNOSIS — R30.0 DYSURIA: Primary | ICD-10-CM

## 2025-05-28 DIAGNOSIS — N39.0 RECURRENT UTI: ICD-10-CM

## 2025-05-28 NOTE — TELEPHONE ENCOUNTER
"05/28/2025 10:29 AM   Fax received from [facility] stating the following:  "Resident complaining of burning when urinating and pelvic pain. States she thinks she has another UTI. Please advise."    Note sent to PCP for advice since she is in another office today.  "

## 2025-05-29 NOTE — TELEPHONE ENCOUNTER
I placed a UA order and can be faxed to her facility. I also placed a urology referral for recurrent UTI's.      I have signed for the following orders AND/OR meds.  Please call the patient and ask the patient to schedule the testing AND/OR inform about any medications that were sent. Medications have been sent to pharmacy listed below      Orders Placed This Encounter   Procedures    Urinalysis, Reflex to Urine Culture Urine, Clean Catch     Standing Status:   Future     Number of Occurrences:   1     Expected Date:   5/29/2025     Expiration Date:   7/28/2026     Preferred Collection Type:   Urine, Clean Catch     Specimen Source:   Urine    Ambulatory referral/consult to Urology     Standing Status:   Future     Expected Date:   6/5/2025     Expiration Date:   6/29/2026     Referral Priority:   Routine     Referral Type:   Consultation     Referral Reason:   Specialty Services Required     Requested Specialty:   Urology              [facility] [place], [place] - [address]  [address]  [facility] [address]  Phone: [phone] Fax: [phone]

## 2025-06-14 ENCOUNTER — PATIENT MESSAGE (OUTPATIENT)
Dept: ADMINISTRATIVE | Facility: CLINIC | Age: 85
End: 2025-06-14
Payer: MEDICARE

## 2025-07-25 ENCOUNTER — TELEPHONE (OUTPATIENT)
Dept: UROLOGY | Facility: CLINIC | Age: 85
End: 2025-07-25
Payer: MEDICARE

## 2025-07-25 NOTE — TELEPHONE ENCOUNTER
Copied from CRM #[id number]. Topic: General Inquiry - Return Call  >> Jul 25, 2025 10:25 AM Smiley wrote:  Type:  Patient Returning Call    Who Called: pt's daughter/Lidya  Who Left Message for Patient: Jessica  Does the patient know what this is regarding?: to gail appt from referral  Would the patient rather a call back or a response via [facility]? phone  Best Call Back Number: [phone]   Additional Information:

## 2025-07-25 NOTE — TELEPHONE ENCOUNTER
Spoke with patient daughter who was able to provide acceptable patient identifiers prior to start of conversation. Patient scheduled for new patient visit with Dr. Odell Mckinney on 08/28/2025 9:30AM [place] location. Patient verbalized understanding, no further assistance needed.